# Patient Record
Sex: FEMALE | Race: WHITE | NOT HISPANIC OR LATINO | Employment: FULL TIME | URBAN - METROPOLITAN AREA
[De-identification: names, ages, dates, MRNs, and addresses within clinical notes are randomized per-mention and may not be internally consistent; named-entity substitution may affect disease eponyms.]

---

## 2017-08-17 ENCOUNTER — ALLSCRIPTS OFFICE VISIT (OUTPATIENT)
Dept: OTHER | Facility: OTHER | Age: 57
End: 2017-08-17

## 2017-09-22 ENCOUNTER — TRANSCRIBE ORDERS (OUTPATIENT)
Dept: ADMINISTRATIVE | Facility: HOSPITAL | Age: 57
End: 2017-09-22

## 2017-09-22 DIAGNOSIS — I47.1 PAROXYSMAL SUPRAVENTRICULAR TACHYCARDIA (HCC): ICD-10-CM

## 2017-09-22 DIAGNOSIS — E78.5 OTHER AND UNSPECIFIED HYPERLIPIDEMIA: Primary | ICD-10-CM

## 2017-09-22 DIAGNOSIS — R00.2 PALPITATIONS: ICD-10-CM

## 2017-09-25 ENCOUNTER — GENERIC CONVERSION - ENCOUNTER (OUTPATIENT)
Dept: OTHER | Facility: OTHER | Age: 57
End: 2017-09-25

## 2017-09-25 LAB
CHOLEST SERPL-MCNC: 238 MG/DL (ref 100–199)
CHOLEST/HDLC SERPL: 4.8 {RATIO} (ref 0–4.4)
HDLC SERPL-MCNC: 50 MG/DL
LDLC SERPL CALC-MCNC: 172 MG/DL (ref 0–99)
LIPOPROTEIN (A) (HISTORICAL): 0.89 (ref 0–3)
TRIGL SERPL-MCNC: 78 MG/DL (ref 0–149)
TSH SERPL DL<=0.05 MIU/L-ACNC: 1.58 M[IU]/L (ref 0.45–4.5)
VLDLC SERPL CALC-MCNC: 16 MG/DL (ref 5–40)

## 2017-09-26 ENCOUNTER — GENERIC CONVERSION - ENCOUNTER (OUTPATIENT)
Dept: OTHER | Facility: OTHER | Age: 57
End: 2017-09-26

## 2017-10-12 ENCOUNTER — GENERIC CONVERSION - ENCOUNTER (OUTPATIENT)
Dept: OTHER | Facility: OTHER | Age: 57
End: 2017-10-12

## 2017-10-12 ENCOUNTER — HOSPITAL ENCOUNTER (OUTPATIENT)
Dept: NON INVASIVE DIAGNOSTICS | Facility: HOSPITAL | Age: 57
Discharge: HOME | End: 2017-10-12
Attending: INTERNAL MEDICINE
Payer: COMMERCIAL

## 2017-10-12 DIAGNOSIS — E78.5 OTHER AND UNSPECIFIED HYPERLIPIDEMIA: ICD-10-CM

## 2017-10-12 DIAGNOSIS — R00.2 PALPITATIONS: ICD-10-CM

## 2017-10-12 DIAGNOSIS — I47.1 PAROXYSMAL SUPRAVENTRICULAR TACHYCARDIA (HCC): ICD-10-CM

## 2017-10-12 PROCEDURE — 93017 CV STRESS TEST TRACING ONLY: CPT

## 2017-10-12 RX ORDER — IBUPROFEN 200 MG
TABLET ORAL EVERY 6 HOURS PRN
COMMUNITY
End: 2018-09-25 | Stop reason: ALTCHOICE

## 2017-11-15 DIAGNOSIS — Z12.31 ENCOUNTER FOR SCREENING MAMMOGRAM FOR MALIGNANT NEOPLASM OF BREAST: ICD-10-CM

## 2017-11-15 DIAGNOSIS — R92.8 OTHER ABNORMAL AND INCONCLUSIVE FINDINGS ON DIAGNOSTIC IMAGING OF BREAST: ICD-10-CM

## 2017-11-15 DIAGNOSIS — E78.5 HYPERLIPIDEMIA: ICD-10-CM

## 2017-11-15 DIAGNOSIS — Z78.0 ASYMPTOMATIC MENOPAUSAL STATE: ICD-10-CM

## 2017-11-15 DIAGNOSIS — M54.2 CERVICALGIA: ICD-10-CM

## 2017-12-01 ENCOUNTER — GENERIC CONVERSION - ENCOUNTER (OUTPATIENT)
Dept: FAMILY MEDICINE CLINIC | Facility: CLINIC | Age: 57
End: 2017-12-01

## 2017-12-07 ENCOUNTER — ALLSCRIPTS OFFICE VISIT (OUTPATIENT)
Dept: OTHER | Facility: OTHER | Age: 57
End: 2017-12-07

## 2017-12-07 ENCOUNTER — GENERIC CONVERSION - ENCOUNTER (OUTPATIENT)
Dept: OTHER | Facility: OTHER | Age: 57
End: 2017-12-07

## 2017-12-13 ENCOUNTER — GENERIC CONVERSION - ENCOUNTER (OUTPATIENT)
Dept: FAMILY MEDICINE CLINIC | Facility: CLINIC | Age: 57
End: 2017-12-13

## 2017-12-14 ENCOUNTER — GENERIC CONVERSION - ENCOUNTER (OUTPATIENT)
Dept: FAMILY MEDICINE CLINIC | Facility: CLINIC | Age: 57
End: 2017-12-14

## 2017-12-26 ENCOUNTER — GENERIC CONVERSION - ENCOUNTER (OUTPATIENT)
Dept: FAMILY MEDICINE CLINIC | Facility: CLINIC | Age: 57
End: 2017-12-26

## 2017-12-26 ENCOUNTER — LAB CONVERSION - ENCOUNTER (OUTPATIENT)
Dept: FAMILY MEDICINE CLINIC | Facility: CLINIC | Age: 57
End: 2017-12-26

## 2017-12-26 LAB
A/G RATIO (HISTORICAL): 2 (ref 1.2–2.2)
ALBUMIN SERPL BCP-MCNC: 4.2 G/DL (ref 3.5–5.5)
ALP SERPL-CCNC: 86 IU/L (ref 39–117)
ALT SERPL W P-5'-P-CCNC: 18 IU/L (ref 0–32)
AST SERPL W P-5'-P-CCNC: 22 IU/L (ref 0–40)
BASOPHILS # BLD AUTO: 0 %
BASOPHILS # BLD AUTO: 0 X10E3/UL (ref 0–0.2)
BILIRUB SERPL-MCNC: 0.3 MG/DL (ref 0–1.2)
BUN SERPL-MCNC: 19 MG/DL (ref 6–24)
BUN/CREA RATIO (HISTORICAL): 26 (ref 9–23)
CALCIUM SERPL-MCNC: 9.1 MG/DL (ref 8.7–10.2)
CHLORIDE SERPL-SCNC: 104 MMOL/L (ref 96–106)
CHOLEST SERPL-MCNC: 246 MG/DL (ref 100–199)
CHOLEST/HDLC SERPL: 4.4 RATIO UNITS (ref 0–4.4)
CO2 SERPL-SCNC: 26 MMOL/L (ref 18–29)
CREAT SERPL-MCNC: 0.72 MG/DL (ref 0.57–1)
DEPRECATED RDW RBC AUTO: 13.4 % (ref 12.3–15.4)
EGFR AFRICAN AMERICAN (HISTORICAL): 108 ML/MIN/1.73
EGFR-AMERICAN CALC (HISTORICAL): 94 ML/MIN/1.73
EOSINOPHIL # BLD AUTO: 0.1 X10E3/UL (ref 0–0.4)
EOSINOPHIL # BLD AUTO: 4 %
GLUCOSE SERPL-MCNC: 90 MG/DL (ref 65–99)
HCT VFR BLD AUTO: 39.3 % (ref 34–46.6)
HDLC SERPL-MCNC: 56 MG/DL
HGB BLD-MCNC: 13.1 G/DL (ref 11.1–15.9)
IMM.GRANULOCYTES (CD4/8) (HISTORICAL): 0 %
IMM.GRANULOCYTES (CD4/8) (HISTORICAL): 0 X10E3/UL (ref 0–0.1)
INTERPRETATION (HISTORICAL): NORMAL
LDLC SERPL CALC-MCNC: 176 MG/DL (ref 0–99)
LYMPHOCYTES # BLD AUTO: 1.3 X10E3/UL (ref 0.7–3.1)
LYMPHOCYTES # BLD AUTO: 45 %
MCH RBC QN AUTO: 28.5 PG (ref 26.6–33)
MCHC RBC AUTO-ENTMCNC: 33.3 G/DL (ref 31.5–35.7)
MCV RBC AUTO: 85 FL (ref 79–97)
MONOCYTES # BLD AUTO: 0.2 X10E3/UL (ref 0.1–0.9)
MONOCYTES (HISTORICAL): 9 %
NEUTROPHILS # BLD AUTO: 1.2 X10E3/UL (ref 1.4–7)
NEUTROPHILS # BLD AUTO: 42 %
PLATELET # BLD AUTO: 185 X10E3/UL (ref 150–379)
POTASSIUM SERPL-SCNC: 4.3 MMOL/L (ref 3.5–5.2)
RBC (HISTORICAL): 4.6 X10E6/UL (ref 3.77–5.28)
SODIUM SERPL-SCNC: 144 MMOL/L (ref 134–144)
TOT. GLOBULIN, SERUM (HISTORICAL): 2.1 G/DL (ref 1.5–4.5)
TOTAL PROTEIN (HISTORICAL): 6.3 G/DL (ref 6–8.5)
TRIGL SERPL-MCNC: 72 MG/DL (ref 0–149)
VLDLC SERPL CALC-MCNC: 14 MG/DL (ref 5–40)
WBC # BLD AUTO: 2.8 X10E3/UL (ref 3.4–10.8)

## 2017-12-27 ENCOUNTER — GENERIC CONVERSION - ENCOUNTER (OUTPATIENT)
Dept: OTHER | Facility: OTHER | Age: 57
End: 2017-12-27

## 2018-01-10 NOTE — RESULT NOTES
Discussion/Summary   Normal stress test      Verified Results  STRESS TEST ONLY, EXERCISE [id number] 10:51AM Calista [place]     Test Name Result Flag Reference   STRESS TEST ONLY, EXERCISE (Report)     [address]   [address] [facility]   [address]   [phone]     Stress Electrocardiography during Exercise     Name: Lindie Dancer   MR #: [id number]   Account #: [de-identified]   Study date: 10/12/2017   : 1960   Age: 65 years   Gender: Female   Height: 65 in   Weight: 136 lb   BSA: 1 68 m squared     Allergies: ALLERGIES NOT ON FILE     Diagnosis: E78 5 - Hyperlipidemia, unspecified, I47 1 - Supraventricular tachycardia, R00 2 - Palpitations     Primary Physician: Kwadwo Awad MD   Referring Physician: Yuri Thurman MD   Interpreting Physician: Loreto Gross MD     CLINICAL QUESTION: Detection of coronary artery disease  HISTORY: The patient is a 65year old  female  Chest pain status: no chest pain  Other symptoms: palpitations  Coronary artery disease risk factors: dyslipidemia and family history of premature coronary artery disease  Cardiovascular history: none significant  Medications: no cardiac drugs  PHYSICAL EXAM: Baseline physical exam screening: normal      REST ECG: Normal sinus rhythm  PROCEDURE: Treadmill exercise testing was performed, using the Albert protocol  Stress electrocardiographic evaluation was performed  The heart rate was 98, at the start of the study  Systolic blood pressure was 140 mmHg, at the start of   the study  Diastolic blood pressure was 80 mmHg, at the start of the study  ALBERT PROTOCOL:   HR bpm SBP mmHg DBP mmHg Symptoms Rhythm/conduct   Baseline 98 140 80 none NSR   Stage 1 121 120 80 none --   Stage 2 139 124 80 none --   Stage 3 160 -- -- none --   Immediate 148 138 80 none --   Recovery 1 114 128 80 none --   Recovery 2 108 110 80 none --   No medications or fluids given       STRESS SUMMARY: Duration of exercise was 9 min  The patient exercised to protocol stage 3  Maximal work rate was 10 1 METs  Maximal heart rate during stress was 160 bpm ( 98 % of maximal predicted heart rate)  The heart rate response to   stress was normal  There was resting hypertension with an appropriate blood pressure response to stress  The rate-pressure product for the peak heart rate and blood pressure was 31868  There was no chest pain during stress  The stress test   was terminated due to achievement of target heart rate  pre O2 sat= 97%     Peak O2 Sat= 98% The stress ECG was negative for ischemia  There were no stress arrhythmias or conduction abnormalities  SUMMARY:   - Stress results: Duration of exercise was 9 min  Target heart rate was achieved  There was resting hypertension with an appropriate blood pressure response to stress  There was no chest pain during stress  - ECG conclusions: The stress ECG was negative for ischemia  IMPRESSIONS: Normal study after maximal exercise without reproduction of symptoms  Resting tachycardia noted       Prepared and signed by     Tejal Vyas MD   Signed 10/12/2017 15:49:02

## 2018-01-13 VITALS
SYSTOLIC BLOOD PRESSURE: 110 MMHG | HEIGHT: 65 IN | HEART RATE: 72 BPM | DIASTOLIC BLOOD PRESSURE: 80 MMHG | OXYGEN SATURATION: 98 % | BODY MASS INDEX: 22.79 KG/M2 | WEIGHT: 136.8 LBS

## 2018-01-14 NOTE — PROGRESS NOTES
Assessment    1  Encounter for preventive health examination (V70 0) (Z00 00)   2  Body mass index (BMI) 23 0-23 9, adult (V85 1) (Z68 23)   3  Cervicalgia (723 1) (M54 2)    Plan  Body mass index (BMI) 23 0-23 9, adult    · (1) CBC/PLT/DIFF; Status:Active; Requested for:29Pfu8358;   Cervicalgia    · *1 - [facility] Physical Therapy  Consult  Status: Hold For - Scheduling   Requested for: 13SEP6155  Care Summary provided  : Yes  Colon cancer screening    · COLONOSCOPY; Status:Active; Requested for:36Nkr3519;   Encounter for screening mammogram for malignant neoplasm of breast    · * MAMMO SCREENING BILATERAL W CAD; Status:Active; Requested for:83Mqd7578; Health Maintenance    · Follow-up visit in 1 year Evaluation and Treatment  Follow-up  Status: Hold For -  Scheduling  Requested for: 29IRX8903  Lipid screening    · (1) COMPREHENSIVE METABOLIC PANEL; Status:Active; Requested for:78Uml1632;    · (1) LIPID PANEL, FASTING; Status:Active; Requested for:81Epv2070;     Discussion/Summary  health maintenance visit Currently, she eats a healthy diet and has an adequate exercise regimen  the risks and benefits of cervical cancer screening were discussed cervical cancer screening is needed every three years next cervical cancer screening is due 2018 Breast cancer screening: the risks and benefits of breast cancer screening were discussed and mammogram has been ordered  Colorectal cancer screening: colonoscopy has been ordered  Screening lab work includes hemoglobin, glucose and thyroid function testing  The patient declines immunizations  Advice and education were given regarding nutrition, aerobic exercise, calcium supplements, vitamin D supplements, alcohol use and sunscreen use  Patient discussion: discussed with the patient  Chief Complaint  pt present for CPE  ac/cma      History of Present Illness  HM, Adult Female: The patient is being seen for a health maintenance evaluation   The last health maintenance visit was 12 month(s) ago  General Health: The patient's health since the last visit is described as good  She has regular dental visits  She denies vision problems  She denies hearing loss  Immunizations status: not up to date  Lifestyle:  She consumes a diverse and healthy diet  She does not have any weight concerns  She exercises regularly  She exercises 3 or more times per week  Exercise includes biking  She does not use tobacco  She denies alcohol use  She denies drug use  Reproductive health: the patient is postmenopausal    Screening: cancer screening reviewed and updated  metabolic screening reviewed and updated  risk screening reviewed and updated  HPI: Here for CPE  Has continued R neck pain, Dr Rory Castro had recommended PT in the past but she did not do  No radiation, seems to be worse with stress and she is in the process of selling her mother's house and moving mother in with her  Review of Systems    Constitutional: No fever, no chills, feels well, no tiredness, no recent weight gain or weight loss  Eyes: No complaints of eye pain, no red eyes, no eyesight problems, no discharge, no dry eyes, no itching of eyes  ENT: no complaints of earache, no loss of hearing, no nose bleeds, no nasal discharge, no sore throat, no hoarseness  Cardiovascular: No complaints of slow heart rate, no fast heart rate, no chest pain, no palpitations, no leg claudication, no lower extremity edema  Respiratory: No complaints of shortness of breath, no wheezing, no cough, no SOB on exertion, no orthopnea, no PND  Gastrointestinal: No complaints of abdominal pain, no constipation, no nausea or vomiting, no diarrhea, no bloody stools  Genitourinary: No complaints of dysuria, no incontinence, no pelvic pain, no dysmenorrhea, no vaginal discharge or bleeding  Musculoskeletal: as noted in HPI     Integumentary: No complaints of skin rash or lesions, no itching, no skin wounds, no breast pain or lump  Neurological: No complaints of headache, no confusion, no convulsions, no numbness, no dizziness or fainting, no tingling, no limb weakness, no difficulty walking  Psychiatric: Not suicidal, no sleep disturbance, no anxiety or depression, no change in personality, no emotional problems  Endocrine: No complaints of proptosis, no hot flashes, no muscle weakness, no deepening of the voice, no feelings of weakness  Hematologic/Lymphatic: No complaints of swollen glands, no swollen glands in the neck, does not bleed easily, does not bruise easily  Active Problems    1  Allergic rhinitis (477 9) (J30 9)   2  Colon cancer screening (V76 51) (Z12 11)   3  Dysphagia (787 20) (R13 10)   4  Encounter for routine gynecological examination (V72 31) (Z01 419)   5  Encounter for screening mammogram for malignant neoplasm of breast (V76 12)   (Z12 31)   6  Paroxysmal atrial tachycardia (427 0) (I47 1)   7  Pityriasis rosea (696 3) (L42)   8  Screening for eye condition (V80 2) (Z13 5)    Past Medical History    · History of Acute upper respiratory infection (465 9) (J06 9)   · History of acute conjunctivitis (V12 49) (Z86 69)   · History of athlete's foot (V12 09) (Z86 19)   · History of Late Effects Of Sprain Or Strain (905 7)   · Pityriasis rosea (696 3) (L42)   · History of Screening for malignant neoplasm of breast (V76 10) (Z12 39)    Family History  Mother    · Family history of Breast Cancer (V16 3)  Father    · Family history of Coronary Artery Disease (V17 49)    Social History    · Never A Smoker    Current Meds   1  Benadryl Allergy CAPS; as needed; Therapy: 20Mar2012 to  Requested for: 93LAK4820 Recorded    Allergies    1  Skelaxin TABS   2   Penicillins    Vitals   Recorded: 43GDE7412 53:36UB   Systolic 596   Diastolic 86   Heart Rate 76   Respiration 16   Temperature 97 F   Height 5 ft 5 in   Weight 143 lb    BMI Calculated 23 8   BSA Calculated 1 72     Physical Exam    Constitutional   General appearance: No acute distress, well appearing and well nourished  Eyes   Conjunctiva and lids: No swelling, erythema or discharge  Pupils and irises: Equal, round, reactive to light  Ears, Nose, Mouth, and Throat   External inspection of ears and nose: Normal     Otoscopic examination: Tympanic membranes translucent with normal light reflex  Canals patent without erythema  Hearing: Normal     Nasal mucosa, septum, and turbinates: Normal without edema or erythema  Lips, teeth, and gums: Normal, good dentition  Oropharynx: Normal with no erythema, edema, exudate or lesions  Neck   Neck: Supple, symmetric, trachea midline, no masses  Thyroid: Normal, no thyromegaly  Pulmonary   Respiratory effort: No increased work of breathing or signs of respiratory distress  Percussion of chest: Normal     Palpation of chest: Normal     Auscultation of lungs: Clear to auscultation  Cardiovascular   Palpation of heart: Normal PMI, no thrills  Auscultation of heart: Normal rate and rhythm, normal S1 and S2, no murmurs  Carotid pulses: 2+ bilaterally  Abdominal aorta: Normal     Femoral pulses: 2+ bilaterally  Pedal pulses: 2+ bilaterally  Examination of extremities for edema and/or varicosities: Normal     Chest   Breasts: Normal, no dimpling or skin changes appreciated  Palpation of breasts and axillae: Normal, no masses palpated  Abdomen   Abdomen: Non-tender, no masses  Liver and spleen: No hepatomegaly or splenomegaly  Examination for hernias: No hernia appreciated  Lymphatic   Palpation of lymph nodes in neck: No lymphadenopathy  Palpation of lymph nodes in axillae: No lymphadenopathy  Palpation of lymph nodes in groin: No lymphadenopathy  Palpation of lymph nodes in other areas: No lymphadenopathy  Musculoskeletal   Gait and station: Normal     Digits and nails: Normal without clubbing or cyanosis      Joints, bones, and muscles: Abnormal   (L trapezius spasm and tenderness)   Range of motion: Normal     Stability: Normal     Muscle strength/tone: Normal     Skin   Skin and subcutaneous tissue: Normal without rashes or lesions  Palpation of skin and subcutaneous tissue: Normal turgor  Neurologic   Cranial nerves: Cranial nerves II-XII intact  Reflexes: 2+ and symmetric  Sensation: No sensory loss  Psychiatric   Judgment and insight: Normal     Orientation to person, place, and time: Normal     Recent and remote memory: Intact  Mood and affect: Normal        Procedure    Procedure: Visual Acuity Test    Indication: routine screening  Inforrmation supplied by a Snellen chart  Results: 20/20 in both eyes without corrective device, 20/20 in the right eye without corrective device, 20/20 in the left eye without corrective device      Health Management  Encounter for routine gynecological examination   (every) THINPREP PAP; every 1 year; Last [id number]; Next Due: 94CUS9489; Overdue  Encounter for screening mammogram for malignant neoplasm of breast   Digital Bilateral Screening Mammogram With CAD; every 1 year; Last 38AKI1965; Next  Due: 48BSB9196;  Overdue    Signatures   Electronically signed by : Pauline Kawasaki, M D ; Sep 22 2016  9:03PM EST                       (Author)

## 2018-01-16 NOTE — RESULT NOTES
Discussion/Summary   Not much change in cholesterol level with normal TSH and low risk high sensitivity CRP  Was to have exercise stress test, but I do not see the report  Check into stress test report and scheduling         Verified Results  (Q) CARDIO IQ(R) HS-CRP [id number] 12:00AM [facility] [employer]     Test Name Result Flag Reference   LIPOPROTEIN (a) 0 89  0 00-3 00     (1) LIPID PANEL, FASTING [id number] 12:00AM [employer]     Test Name Result Flag Reference   CHOLESTEROL 238 H 100-199   HDL,DIRECT 50  >39   LDL CHOLESTEROL CALCULATED 172 H 0-99   TRIGLYCERIDES 78  0-149   VLDL,CALCULATED 16  5-40   TCHOL/HDL RATIO 4 8 H 0 0-4 4     (1) TSH [id number] 12:00AM [facility] [employer]     Test Name Result Flag Reference   TSH 1 580   450-4 500

## 2018-01-23 NOTE — RESULT NOTES
Verified Results  (1) CBC/PLT/DIFF [id number] 09:17AM Thais Milligan     Test Name Result Flag Reference   WBC 2 8 x10E3/uL L 3 4-10 8   RBC 4 60 x10E6/uL  3 77-5 28   Hemoglobin 13 1 g/dL  11 1-15 9   Hematocrit 39 3 %  34 0-46  6   MCV 85 fL  79-97   MCH 28 5 pg  26 6-33 0   MCHC 33 3 g/dL  31 5-35 7   RDW 13 4 %  12 3-15 4   Platelets 332 G72L7/TQ  150-379   Neutrophils 42 %  Not Estab  Lymphs 45 %  Not Estab  Monocytes 9 %  Not Estab  Eos 4 %  Not Estab  Basos 0 %  Not Estab  Neutrophils (Absolute) 1 2 x10E3/uL L 1 4-7 0   Lymphs (Absolute) 1 3 x10E3/uL  0 7-3 1   Monocytes(Absolute) 0 2 x10E3/uL  0 1-0 9   Eos (Absolute) 0 1 x10E3/uL  0 0-0 4   Baso (Absolute) 0 0 x10E3/uL  0 0-0 2   Immature Granulocytes 0 %  Not Estab  Immature Grans (Abs) 0 0 x10E3/uL  0 0-0 1     (1) COMPREHENSIVE METABOLIC PANEL [id number] 66:72BI Roseann Shaffer Stable     Test Name Result Flag Reference   Glucose, Serum 90 mg/dL  65-99   BUN 19 mg/dL  6-24   Creatinine, Serum 0 72 mg/dL  0 57-1 00   BUN/Creatinine Ratio 26 H 9-23   Sodium, Serum 144 mmol/L  134-144   Potassium, Serum 4 3 mmol/L  3 5-5 2   Chloride, Serum 104 mmol/L     Carbon Dioxide, Total 26 mmol/L  18-29   Calcium, Serum 9 1 mg/dL  8 7-10 2   Protein, Total, Serum 6 3 g/dL  6 0-8 5   Albumin, Serum 4 2 g/dL  3 5-5 5   Globulin, Total 2 1 g/dL  1 5-4 5   A/G Ratio 2 0  1 2-2 2   Bilirubin, Total 0 3 mg/dL  0 0-1 2   Alkaline Phosphatase, S 86 IU/L     AST (SGOT) 22 IU/L  0-40   ALT (SGPT) 18 IU/L  0-32   eGFR If NonAfricn Am 94 mL/min/1 73  >59   eGFR If Africn Am 108 mL/min/1 73  >59     (1) LIPID PANEL, FASTING [id number] 09:17AM Roseann Shaffer Stable     Test Name Result Flag Reference   Cholesterol, Total 246 mg/dL H 100-199   Triglycerides 72 mg/dL  0-149   HDL Cholesterol 56 mg/dL  >39   VLDL Cholesterol Bo 14 mg/dL  5-40   LDL Cholesterol Calc 176 mg/dL H 0-99   T  Chol/HDL Ratio 4 4 ratio units  0 0-4 4   T   Chol/HDL Ratio Men  Women                                               1/2 Avg  Risk  3 4    3 3                                                   Avg Risk  5 0    4 4                                                2X Avg  Risk  9 6    7 1                                                3X Avg  Risk 23 4   11 0     [facility]) Cardiovascular Risk Assessment [id number] 09:17AM Catarino Boxer     Test Name Result Flag Reference   Interpretation Note     Supplemental report is available  PDF Image

## 2018-01-23 NOTE — PROGRESS NOTES
Assessment    1  Encounter for preventive health examination (V70 0) (Z00 00)   2  Body mass index (BMI) 19 9 or less, adult (Z68 1)   3  Colon cancer screening (V76 51) (Z12 11)    Plan  Cervicalgia    · *1 -  PHYSICAL THERAPY-[facility]  *  Status: Active  Requested  for: [id number]  Care Summary provided  : Yes  Colon cancer screening    · COLONOSCOPY; Status:Active; Requested for:86Egq2395;   Dyslipidemia    · (1) CBC/PLT/DIFF; Status:Active; Requested for:71Ajn6527;    · (1) COMPREHENSIVE METABOLIC PANEL; Status:Active; Requested for:46Anx5753;    · (1) LIPID PANEL, FASTING; Status:Active; Requested for:46Wcm0684;   Dyslipidemia, Menopause    · * DXA BONE DENSITY SPINE HIP AND PELVIS; Status:Hold For - Scheduling;  Requested for:88Vts9779;   Encounter for screening mammogram for malignant neoplasm of breast    · * MAMMO SCREENING BILATERAL W CAD; Status:Active; Requested for:51Itf8051;     Discussion/Summary  health maintenance visit Currently, she eats a healthy diet and has an adequate exercise regimen  cervical cancer screening is current cervical cancer screening is needed every three years next cervical cancer screening is due 2018 Breast cancer screening: the risks and benefits of breast cancer screening were discussed, monthly self breast exam was advised and mammogram has been ordered  Colorectal cancer screening: colonoscopy has been ordered  Osteoporosis screening: bone mineral density testing has been ordered  Screening lab work includes hemoglobin, glucose and lipid profile  The immunizations are up to date  Advice and education were given regarding nutrition, aerobic exercise, calcium supplements, vitamin D supplements, alcohol use, sunscreen use and seat belt use  Patient discussion: discussed with the patient  Chief Complaint  pt present for CPE  ac/cma      History of Present Illness  HM, Adult Female: The patient is being seen for a health maintenance evaluation   The last health maintenance visit was 12 month(s) ago  General Health: The patient's health since the last visit is described as good  She has regular dental visits  She denies vision problems  She denies hearing loss  Immunizations status: not up to date  Lifestyle:  She consumes a diverse and healthy diet  She does not have any weight concerns  She does not exercise regularly  She does not use tobacco  She denies alcohol use  She denies drug use  Reproductive health: the patient is postmenopausal    Screening: cancer screening reviewed and updated  metabolic screening reviewed and updated  risk screening reviewed and updated  HPI: Here for CPE, also has a cough  Review of Systems    Constitutional: No fever, no chills, feels well, no tiredness, no recent weight gain or weight loss  Eyes: No complaints of eye pain, no red eyes, no eyesight problems, no discharge, no dry eyes, no itching of eyes  ENT: no complaints of earache, no loss of hearing, no nose bleeds, no nasal discharge, no sore throat, no hoarseness  Cardiovascular: No complaints of slow heart rate, no fast heart rate, no chest pain, no palpitations, no leg claudication, no lower extremity edema  Respiratory: No complaints of shortness of breath, no wheezing, no cough, no SOB on exertion, no orthopnea, no PND  Gastrointestinal: No complaints of abdominal pain, no constipation, no nausea or vomiting, no diarrhea, no bloody stools  Genitourinary: No complaints of dysuria, no incontinence, no pelvic pain, no dysmenorrhea, no vaginal discharge or bleeding  Musculoskeletal: No complaints of arthralgias, no myalgias, no joint swelling or stiffness, no limb pain or swelling  Integumentary: No complaints of skin rash or lesions, no itching, no skin wounds, no breast pain or lump     Neurological: No complaints of headache, no confusion, no convulsions, no numbness, no dizziness or fainting, no tingling, no limb weakness, no difficulty walking  Psychiatric: Not suicidal, no sleep disturbance, no anxiety or depression, no change in personality, no emotional problems  Endocrine: No complaints of proptosis, no hot flashes, no muscle weakness, no deepening of the voice, no feelings of weakness  Hematologic/Lymphatic: No complaints of swollen glands, no swollen glands in the neck, does not bleed easily, does not bruise easily  Active Problems    1  Allergic rhinitis (477 9) (J30 9)   2  Cervicalgia (723 1) (M54 2)   3  Colon cancer screening (V76 51) (Z12 11)   4  Dyslipidemia (272 4) (E78 5)   5  Dysphagia (787 20) (R13 10)   6  Encounter for routine gynecological examination (V72 31) (Z01 419)   7  Encounter for screening mammogram for malignant neoplasm of breast (V76 12)   (Z12 31)   8  Intermittent palpitations (785 1) (R00 2)   9  Lipid screening (V77 91) (Z13 220)   10  Paroxysmal atrial tachycardia (427 0) (I47 1)   11  Pityriasis rosea (696 3) (L42)   12  Screening for eye condition (V80 2) (Z13 5)   13  Vitreous detachment of right eye (379 21) (H43 811)    Past Medical History    · History of Acute upper respiratory infection (465 9) (J06 9)   · History of acute conjunctivitis (V12 49) (Z86 69)   · History of athlete's foot (V12 09) (Z86 19)   · History of Late Effects Of Sprain Or Strain (905 7)   · Pityriasis rosea (696 3) (L42)   · History of Screening for malignant neoplasm of breast (V76 10) (Z12 31)    Family History  Mother    · Family history of Breast Cancer (V16 3)  Father    · Family history of Coronary Artery Disease (V17 49)    Social History    · Never A Smoker    Current Meds   1  Advil 200 MG Oral Capsule; TAKE 1 CAPSULE EVERY 4 TO 6 HOURS; Therapy: [id number] to Recorded   2  Benadryl Allergy CAPS; TAKE 1 CAPSULE Bedtime; Therapy: 20Mar2012 to  Requested for: 17Aug2017 Recorded    Allergies    1  Skelaxin TABS   2   Penicillins    Vitals   Recorded: 93ZZX2322 06:41PM   Temperature 97 8 F   Heart Rate 82 Respiration 16   Systolic 120   Diastolic 70   Height 5 ft 5 in   Weight 120 lb    BMI Calculated 19 97   BSA Calculated 1 59     Physical Exam    Constitutional   General appearance: No acute distress, well appearing and well nourished  Eyes   Conjunctiva and lids: No swelling, erythema or discharge  Pupils and irises: Equal, round, reactive to light  Ears, Nose, Mouth, and Throat   External inspection of ears and nose: Normal     Otoscopic examination: Tympanic membranes translucent with normal light reflex  Canals patent without erythema  Hearing: Normal     Nasal mucosa, septum, and turbinates: Normal without edema or erythema  Lips, teeth, and gums: Normal, good dentition  Oropharynx: Normal with no erythema, edema, exudate or lesions  Neck   Neck: Supple, symmetric, trachea midline, no masses  Thyroid: Normal, no thyromegaly  Pulmonary   Respiratory effort: No increased work of breathing or signs of respiratory distress  Percussion of chest: Normal     Palpation of chest: Normal     Auscultation of lungs: Clear to auscultation  Cardiovascular   Palpation of heart: Normal PMI, no thrills  Auscultation of heart: Normal rate and rhythm, normal S1 and S2, no murmurs  Carotid pulses: 2+ bilaterally  Abdominal aorta: Normal     Femoral pulses: 2+ bilaterally  Pedal pulses: 2+ bilaterally  Examination of extremities for edema and/or varicosities: Normal     Abdomen   Abdomen: Non-tender, no masses  Liver and spleen: No hepatomegaly or splenomegaly  Examination for hernias: No hernia appreciated  Lymphatic   Palpation of lymph nodes in neck: No lymphadenopathy  Palpation of lymph nodes in axillae: No lymphadenopathy  Palpation of lymph nodes in groin: No lymphadenopathy  Palpation of lymph nodes in other areas: No lymphadenopathy  Musculoskeletal   Gait and station: Normal     Digits and nails: Normal without clubbing or cyanosis      Joints, bones, and muscles: Normal     Range of motion: Normal     Stability: Normal     Muscle strength/tone: Normal     Skin   Skin and subcutaneous tissue: Normal without rashes or lesions  Palpation of skin and subcutaneous tissue: Normal turgor  Neurologic   Cranial nerves: Cranial nerves II-XII intact  Reflexes: 2+ and symmetric  Sensation: No sensory loss  Psychiatric   Judgment and insight: Normal     Orientation to person, place, and time: Normal     Recent and remote memory: Intact  Mood and affect: Normal        Results/Data  [facility] Risk [id number] 06:58PM Micheal Shaffer     Test Name Result Flag Reference   [facility] - Comparative Ten Year Risk of CHD 3 7 %     [facility] - Ten Year Risk of CHD 1 99 %     Sex: Female  Ethnicity: White/  Age: 65  Total Cholesterol: 238    HDL Cholesterol: 50    Systolic Blood Pressure: 135 mmHg  Blood Pressure Medication: No  Diabetes: No  Smoking Status: No       Procedure    Procedure: Visual Acuity Test    Indication: routine screening  Inforrmation supplied by a Snellen chart     Results: 20/13 in both eyes without corrective device, 20/15 in the right eye without corrective device, 20/13 in the left eye without corrective device      Signatures   Electronically signed by : ALLEN Schmidt ; Dec  7 2017  7:10PM EST                       (Author)

## 2018-01-24 VITALS
WEIGHT: 120 LBS | HEIGHT: 65 IN | SYSTOLIC BLOOD PRESSURE: 112 MMHG | DIASTOLIC BLOOD PRESSURE: 70 MMHG | HEART RATE: 82 BPM | RESPIRATION RATE: 16 BRPM | TEMPERATURE: 97.8 F | BODY MASS INDEX: 19.99 KG/M2

## 2018-04-25 ENCOUNTER — TELEPHONE (OUTPATIENT)
Dept: CARDIOLOGY CLINIC | Facility: CLINIC | Age: 58
End: 2018-04-25

## 2018-04-25 NOTE — TELEPHONE ENCOUNTER
Upon Sofia's call, I spoke with Dr Chris Vasquez Dr  Recommended that she either come to the office to get an EKG or go to the E D  Dr Viki Diallo that in the E D  She would be able to be treated for any abnormalities, whereas we would not be able to do so in the office   I relayed the message to Linda Kim

## 2018-05-10 ENCOUNTER — OFFICE VISIT (OUTPATIENT)
Dept: FAMILY MEDICINE CLINIC | Facility: CLINIC | Age: 58
End: 2018-05-10
Payer: COMMERCIAL

## 2018-05-10 VITALS
WEIGHT: 112 LBS | DIASTOLIC BLOOD PRESSURE: 68 MMHG | SYSTOLIC BLOOD PRESSURE: 106 MMHG | TEMPERATURE: 97.9 F | HEART RATE: 100 BPM | HEIGHT: 65 IN | RESPIRATION RATE: 16 BRPM | BODY MASS INDEX: 18.66 KG/M2

## 2018-05-10 DIAGNOSIS — J30.1 SEASONAL ALLERGIC RHINITIS DUE TO POLLEN: Primary | ICD-10-CM

## 2018-05-10 DIAGNOSIS — J32.9 SINUSITIS, UNSPECIFIED CHRONICITY, UNSPECIFIED LOCATION: ICD-10-CM

## 2018-05-10 PROBLEM — R00.2 INTERMITTENT PALPITATIONS: Status: ACTIVE | Noted: 2017-08-17

## 2018-05-10 PROBLEM — E78.5 DYSLIPIDEMIA: Status: ACTIVE | Noted: 2017-08-17

## 2018-05-10 PROCEDURE — 99213 OFFICE O/P EST LOW 20 MIN: CPT | Performed by: INTERNAL MEDICINE

## 2018-05-10 RX ORDER — SULFAMETHOXAZOLE AND TRIMETHOPRIM 800; 160 MG/1; MG/1
1 TABLET ORAL EVERY 12 HOURS SCHEDULED
Qty: 14 TABLET | Refills: 0 | Status: SHIPPED | OUTPATIENT
Start: 2018-05-10 | End: 2018-05-17

## 2018-05-10 NOTE — PROGRESS NOTES
Subjective:      Patient ID: Emeka Matthews is a 65 y o  female  Chief Complaint   Patient presents with    Nasal Congestion     for about 6 days, pt has been taking advil, which states shelps with the throat     Cough    Sore Throat    Sinus pressure       Here with 6 days of acute illness, severe sore throat, congestion, post nasal drip, cough  Denies fever  Taking advil for her throat with some relief  Does have seasonal allergies but did not think this was it  The following portions of the patient's history were reviewed and updated as appropriate: allergies, current medications, past family history, past medical history, past social history, past surgical history and problem list     Review of Systems   Constitutional: Positive for fatigue  HENT: Positive for congestion and sore throat  Respiratory: Positive for cough  Cardiovascular: Negative  Current Outpatient Prescriptions   Medication Sig Dispense Refill    DiphenhydrAMINE HCl (BENADRYL ALLERGY PO) Take by mouth      ibuprofen (MOTRIN) 200 mg tablet Take by mouth every 6 (six) hours as needed for mild pain      sulfamethoxazole-trimethoprim (BACTRIM DS) 800-160 mg per tablet Take 1 tablet by mouth every 12 (twelve) hours for 14 doses 14 tablet 0     No current facility-administered medications for this visit  Objective:    /68   Pulse 100   Temp 97 9 °F (36 6 °C)   Resp 16   Ht 5' 5" (1 651 m)   Wt 50 8 kg (112 lb)   BMI 18 64 kg/m²        Physical Exam   Constitutional: She appears well-developed and well-nourished  HENT:   Head: Normocephalic and atraumatic  Right Ear: Tympanic membrane is retracted  Left Ear: Tympanic membrane is retracted  Nose: Mucosal edema present  Mouth/Throat: Oropharynx is clear and moist    Nasal mucosae edematous and pale   Eyes: Conjunctivae are normal    Neck: Neck supple  No JVD present  No thyromegaly present     Cardiovascular: Normal rate, regular rhythm, normal heart sounds and intact distal pulses  Exam reveals no gallop and no friction rub  No murmur heard  Pulmonary/Chest: Effort normal and breath sounds normal  She has no wheezes  She has no rales  Assessment/Plan:    Allergic rhinitis  Symptoms appear allergic, recommended claritin daily with flonase 2 puffs daily as well  Diagnoses and all orders for this visit:    Seasonal allergic rhinitis due to pollen  Comments:  Advised flonase and claritin PRN  Sinusitis, unspecified chronicity, unspecified location  Comments:  Antibiotics were given in case of worsening symptoms, fever, green discharge, etc   Orders:  -     sulfamethoxazole-trimethoprim (BACTRIM DS) 800-160 mg per tablet; Take 1 tablet by mouth every 12 (twelve) hours for 14 doses          Return if symptoms worsen or fail to improve         Norma Johnson MD

## 2018-05-10 NOTE — PATIENT INSTRUCTIONS
Advised flonase otc 2 puffs to each nostril daily for at least one month  Advised otc claritin once daily PRN  Antibiotics if not improving

## 2018-09-25 ENCOUNTER — OFFICE VISIT (OUTPATIENT)
Dept: OBGYN CLINIC | Facility: CLINIC | Age: 58
End: 2018-09-25
Payer: COMMERCIAL

## 2018-09-25 VITALS
DIASTOLIC BLOOD PRESSURE: 83 MMHG | HEART RATE: 67 BPM | HEIGHT: 65 IN | SYSTOLIC BLOOD PRESSURE: 132 MMHG | WEIGHT: 112 LBS | BODY MASS INDEX: 18.66 KG/M2

## 2018-09-25 DIAGNOSIS — M54.2 NECK PAIN, MUSCULOSKELETAL: Primary | ICD-10-CM

## 2018-09-25 PROCEDURE — 99203 OFFICE O/P NEW LOW 30 MIN: CPT | Performed by: ORTHOPAEDIC SURGERY

## 2018-09-25 RX ORDER — NAPROXEN 500 MG/1
500 TABLET ORAL 2 TIMES DAILY WITH MEALS
Qty: 60 TABLET | Refills: 0 | Status: SHIPPED | OUTPATIENT
Start: 2018-09-25 | End: 2018-10-23 | Stop reason: CLARIF

## 2018-09-25 NOTE — PROGRESS NOTES
Assessment/Plan:  1  Neck pain, musculoskeletal  Ambulatory referral to Physical Therapy    naproxen (NAPROSYN) 500 mg tablet     Yosef Lindsay has symptoms consistent with chronic muscle spasm  She may have some underlying arthritis in her cervical spine but does not demonstrate radiculopathy on exam   I would like for her to start with physical therapy including myofascial release and traction treatments  She will follow up in the office in 6 weeks for repeat evaluation if she still has pain at that time we could proceed with x-rays or further imaging if indicated  Subjective: Alfonso Coburn is a 63 y o  female who presents for evaluation for chronic neck discomfort  She denies any acute injury  She states that the pain is an aching throbbing intermittent discomfort along the upper left neck region  She has difficulty turning her head at times due to discomfort  She denies any radiating pain or radiculopathy down her arm  She has tried multiple attempts at different pillows as well as Advil and Benadryl to try to help her sleep  She denies any weakness in her left upper extremity as well  Review of Systems   Constitutional: Negative for chills, fever and unexpected weight change  HENT: Negative for hearing loss, nosebleeds and sore throat  Eyes: Negative for pain, redness and visual disturbance  Respiratory: Negative for cough, shortness of breath and wheezing  Cardiovascular: Negative for chest pain, palpitations and leg swelling  Gastrointestinal: Negative for abdominal pain, nausea and vomiting  Endocrine: Negative for polydipsia and polyuria  Genitourinary: Negative for dysuria and hematuria  Musculoskeletal:        See HPI   Skin: Negative for rash and wound  Neurological: Negative for dizziness, numbness and headaches  Psychiatric/Behavioral: Negative for decreased concentration and suicidal ideas  The patient is not nervous/anxious            Past Medical History:   Diagnosis Date  Pityriasis rosea        History reviewed  No pertinent surgical history  Family History   Problem Relation Age of Onset    Breast cancer Mother     Coronary artery disease Father        Social History     Occupational History    Not on file  Social History Main Topics    Smoking status: Never Smoker    Smokeless tobacco: Never Used    Alcohol use Not on file    Drug use: Unknown    Sexual activity: Not on file         Current Outpatient Prescriptions:     DiphenhydrAMINE HCl (BENADRYL ALLERGY PO), Take by mouth, Disp: , Rfl:     naproxen (NAPROSYN) 500 mg tablet, Take 1 tablet (500 mg total) by mouth 2 (two) times a day with meals, Disp: 60 tablet, Rfl: 0    Allergies   Allergen Reactions    Metaxalone Hives     Reaction Date: [id number];     Penicillins      Reaction Date: [id number]; Objective:  Vitals:    09/25/18 1708   BP: 132/83   Pulse: 67       Ortho Exam    Physical Exam   Constitutional: She is oriented to person, place, and time  She appears well-developed and well-nourished  HENT:   Head: Normocephalic and atraumatic  Eyes: Conjunctivae are normal    Neck: Neck supple  Muscular tenderness present  No spinous process tenderness present  Decreased range of motion present  Cardiovascular: Intact distal pulses  Pulmonary/Chest: Effort normal    Musculoskeletal:        Cervical back: She exhibits tenderness  She exhibits no bony tenderness  Back:    Neurological: She is alert and oriented to person, place, and time  She has normal strength  No sensory deficit  Skin: Skin is warm and dry  Psychiatric: She has a normal mood and affect  Her behavior is normal    Vitals reviewed

## 2018-10-09 ENCOUNTER — EVALUATION (OUTPATIENT)
Dept: PHYSICAL THERAPY | Facility: CLINIC | Age: 58
End: 2018-10-09
Payer: COMMERCIAL

## 2018-10-09 DIAGNOSIS — M54.2 NECK PAIN, MUSCULOSKELETAL: Primary | ICD-10-CM

## 2018-10-09 PROCEDURE — G8981 BODY POS CURRENT STATUS: HCPCS

## 2018-10-09 PROCEDURE — G8982 BODY POS GOAL STATUS: HCPCS

## 2018-10-09 PROCEDURE — 97161 PT EVAL LOW COMPLEX 20 MIN: CPT

## 2018-10-09 NOTE — PROGRESS NOTES
PT Evaluation     Today's date: 10/9/2018  Patient name: Cami Castellanos  : 1960  MRN: [id number]  Referring provider: Burton Vann DO  Dx:   Encounter Diagnosis     ICD-10-CM    1  Neck pain, musculoskeletal M54 2 Ambulatory referral to Physical Therapy                  Assessment  Impairments: abnormal muscle firing, abnormal or restricted ROM, activity intolerance, impaired physical strength, lacks appropriate home exercise program, pain with function, scapular dyskinesis and poor posture     Assessment details: Pt is a pleasant 63 y o  female who presents to [address] with neck pain w/o radicular sx of insidious onset  Pain has been ongoing for many years  Today, she presents with decreased and painful cervical ROM and joint mobility, decreased B UE and postural strength/awareness, decreased postural control, and high self reports of pain w/ activity  Functionally, she is limited in her ability to sleep through the night, turn to look over shoulder (especially while driving), perform normal work activities for full day, and participate in normal recreational activities  She is motivated to improve  Pt will benefit from skilled PT to address the aforementioned deficits and limitations in an effort to maximize pain free functional mobility and overall quality of life  Progress as able with these goals in mind  Understanding of Dx/Px/POC: good   Prognosis: good    Goals  Short term goals (3 weeks):  1) Pt will improve cervical ROM to WNL w/o pain  2) Pt will improve B UE and postural strength grades by 1/3 grade MMT  3) Pt will report pain at worse <3/10  4) Pt will initiate and progress HEP w/ special emphasis on functional cervical ROM and postural control  Long term goals (6 weeks)  1) Pt will improve FOTO to at least 75   2) Pt will sleep through the night in positioning of choosing 6/7 nights a week w/o waking due to pain    3) Pt will perform full day of work w/ improved postural awareness and no pain  4) Pt will be independent and compliant w/ HEP in order to maximize functional benefit of skilled PT following d/c        Plan  Patient would benefit from: skilled PT  Planned modality interventions: cryotherapy and thermotherapy: hydrocollator packs  Planned therapy interventions: abdominal trunk stabilization, therapeutic activities, therapeutic exercise, therapeutic training, graded motor, graded exercise, graded activity, patient education, postural training, neuromuscular re-education, manual therapy, joint mobilization, activity modification, ADL retraining, body mechanics training, home exercise program, stretching and strengthening  Frequency: 2x week  Duration in visits: 12  Duration in weeks: 6  Treatment plan discussed with: patient  Plan details: POC to include: heat, DTM and TPR, mobs and traction, postural stab    HEP to start: see scanned handout           Subjective Evaluation    History of Present Illness  Mechanism of injury: Pt has had L sided neck and posterior shoulder pain for many years, notes no TAMIKA  Pt takes Advil for pain relief, used to be once a week  Notes that this has been almost every day recently  Works in insurance, mostly desk work  Full day of work activities causes increases in pain  Reports that sleeping is difficult s/t continued pain through night  Has tried a million different pillows w/o success   Pt functional status to follow:  Quality of life: good    Pain  Current pain ratin  At best pain ratin  At worst pain ratin  Location: L posterior shoulder along medial border of scapula   Quality: burning and dull ache  Relieving factors: medications and rest  Aggravating factors: overhead activity and lifting  Progression: no change    Social Support  Steps to enter house: yes  Stairs in house: yes   Lives in: multiple-level home  Lives with: spouse and adult children (takes care of 80year old mother )    Employment status: working ([employer], desk work)  Patient Goals  Patient goals for therapy: increased strength, decreased pain and increased motion  Patient goal: relieve discomofort through L posterior shoulder         Objective     Postural Observations  Seated posture: fair  Standing posture: fair  Correction of posture: makes symptoms better        Palpation     Additional Palpation Details  Mod increase in tissue density through L side MT and distal insertion of LS  Min/mod TTP through these areas as well    Neurological Testing     Sensation   Cervical/Thoracic   Left   Intact: light touch    Right   Intact: light touch    Active Range of Motion   Cervical/Thoracic Spine   Cervical    Left rotation: 35 degrees   Right rotation: 45 degrees     Additional Active Range of Motion Details  SB limited by 50% B w/ min pain  Flex and ext WNL w/o pain    Joint Play   Comments: Grossly WNL    Strength/Myotome Testing     Left Shoulder     Planes of Motion   Flexion: 4   Extension: 4   Abduction: 4   Adduction: 4   External rotation at 0°: 4   Internal rotation at 0°: 4     Right Shoulder     Planes of Motion   Flexion: 4   Extension: 4   Abduction: 4   Adduction: 4   External rotation at 0°: 4   Internal rotation at 0°: 4     Left Elbow   Flexion: 4  Extension: 4    Right Elbow   Flexion: 4  Extension: 4    Additional Strength Details  MT and LT grossly 4-/5 w/o pain    Tests   Cervical   Negative repeated extension and repeated flexion  Left   Negative Spurling's sign  Right   Negative Spurling's sign       Additional Tests Details  Manual cervical traction provides good relief       Flowsheet Rows      Most Recent Value   PT/OT G-Codes   Current Score  64   Projected Score  71   FOTO information reviewed  Yes   Assessment Type  Evaluation   G code set  Changing & Maintaining Body Position   Changing and Maintaining Body Position Current Status ()  CJ   Changing and Maintaining Body Position Goal Status ()  CJ          Precautions: standard Specialty Daily Treatment Diary     Manual         DTM and TPR to L UT/LS and MT        Cervical traction                                     Exercise Diary         UT and LS stretching        Chin tucks        Rows and ext        Wall posture        Walk outs        Cervical isometrics        Horizontal abd w/ scap sq                                                                                                                    Modalities        Heat pre        Ice post

## 2018-10-17 ENCOUNTER — OFFICE VISIT (OUTPATIENT)
Dept: PHYSICAL THERAPY | Facility: CLINIC | Age: 58
End: 2018-10-17
Payer: COMMERCIAL

## 2018-10-17 DIAGNOSIS — M54.2 NECK PAIN, MUSCULOSKELETAL: Primary | ICD-10-CM

## 2018-10-17 PROCEDURE — 97110 THERAPEUTIC EXERCISES: CPT

## 2018-10-17 NOTE — PROGRESS NOTES
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       Objective: See treatment diary below      Precautions: standard     Specialty Daily Treatment Diary     Manual  2       DTM and TPR to L UT/LS and MT X 10 min to left scalenes & UT/LS seated        Cervical traction  Not today                                    Exercise Diary  10-17        UT and LS stretching 3 x 30 sec ea        Chin tucks X 10 w mirror & x 10 supine        Rows and ext GTB 2 x 10 ea        Wall posture Reviewed        Walk outs NV        Cervical isometrics NV        Horizontal abd w/ scap sq NV                                Education  Transfer sit to supine         Neck support - towel roll in pillow case                                                                            Modalities        Heat pre X 10 min seated        Ice post                           Assessment: Tolerated treatment well  Patient would benefit from continued PT; pt with significant left UT & scalene tightness; good recall of correct technique performing cervical stretches; good response to cervical retractions; reported increased relief post therex with tband        Plan: Continue per plan of care   Issue GTB & handout for scap retract, shldr ext & horiz abd w scap sq

## 2018-10-18 ENCOUNTER — OFFICE VISIT (OUTPATIENT)
Dept: PHYSICAL THERAPY | Facility: CLINIC | Age: 58
End: 2018-10-18
Payer: COMMERCIAL

## 2018-10-18 ENCOUNTER — IMMUNIZATION (OUTPATIENT)
Dept: FAMILY MEDICINE CLINIC | Facility: CLINIC | Age: 58
End: 2018-10-18
Payer: COMMERCIAL

## 2018-10-18 DIAGNOSIS — M54.2 NECK PAIN, MUSCULOSKELETAL: Primary | ICD-10-CM

## 2018-10-18 DIAGNOSIS — Z23 FLU VACCINE NEED: Primary | ICD-10-CM

## 2018-10-18 PROCEDURE — 90471 IMMUNIZATION ADMIN: CPT

## 2018-10-18 PROCEDURE — 97110 THERAPEUTIC EXERCISES: CPT

## 2018-10-18 PROCEDURE — 90682 RIV4 VACC RECOMBINANT DNA IM: CPT

## 2018-10-18 NOTE — PROGRESS NOTES
Daily Note     Today's date: 10/18/2018  Patient name: Gokul Pond  : 1960  MRN: [id number]  Referring provider: Kourtney Santillan DO  Dx:   Encounter Diagnosis     ICD-10-CM    1  Neck pain, musculoskeletal M54 2        Start Time: 1145  Stop Time: 1240  Total time in clinic (min): 55 minutes    Subjective: Pt states she had approx a few hours relief post last session; took OTC medication prior to bed     Objective: See treatment diary below      Precautions: standard     Specialty Daily Treatment Diary     Manual  2 3      DTM and TPR to L UT/LS and MT X 10 min to left scalenes & UT/LS seated  X 10 min seated       Cervical traction  Not today                                    Exercise Diary  10-17  10-18       UT and LS stretching 3 x 30 sec ea  Self 5 x 10 sec ea       Chin tucks X 10 w mirror & x 10 supine  Seated x 10 & supine x 10       Rows and ext GTB 2 x 10 ea  GTB 2 x 10 ea       Wall posture Reviewed  Reviewed       Walk outs NV        Cervical isometrics NV        Horizontal abd w/ scap sq NV  RTB x 10                               Education  Transfer sit to supine         Neck support - towel roll in pillow case          Pt issued GTB for scap retr & shldr ext                 Education on form for supine triceps & stance biceps                                                   Modalities        Heat pre X 10 min seated  X 10 min seated       Ice post                             Assessment: Tolerated treatment well  Patient would benefit from continued PT; pt still with Left  UT trigger points & tight scalenes; pt with good recall for correct technique with yesterday's therex; corrected patient 's form for biceps curl that increases shldr pain; instructed patient to avoid overhead tricep press at this time        Plan: Continue per plan of care    Pt issued GTB for scap rows & B shldr ext

## 2018-10-23 ENCOUNTER — OFFICE VISIT (OUTPATIENT)
Dept: PHYSICAL THERAPY | Facility: CLINIC | Age: 58
End: 2018-10-23
Payer: COMMERCIAL

## 2018-10-23 ENCOUNTER — OFFICE VISIT (OUTPATIENT)
Dept: FAMILY MEDICINE CLINIC | Facility: CLINIC | Age: 58
End: 2018-10-23
Payer: COMMERCIAL

## 2018-10-23 VITALS
BODY MASS INDEX: 19.16 KG/M2 | TEMPERATURE: 98.1 F | RESPIRATION RATE: 16 BRPM | HEART RATE: 64 BPM | SYSTOLIC BLOOD PRESSURE: 110 MMHG | WEIGHT: 115 LBS | HEIGHT: 65 IN | DIASTOLIC BLOOD PRESSURE: 62 MMHG

## 2018-10-23 DIAGNOSIS — R22.30 AXILLARY FULLNESS: Primary | ICD-10-CM

## 2018-10-23 DIAGNOSIS — R92.2 DENSE BREASTS: ICD-10-CM

## 2018-10-23 DIAGNOSIS — Z12.39 BREAST CANCER SCREENING: ICD-10-CM

## 2018-10-23 DIAGNOSIS — M54.2 NECK PAIN, MUSCULOSKELETAL: Primary | ICD-10-CM

## 2018-10-23 PROBLEM — R92.30 DENSE BREASTS: Status: ACTIVE | Noted: 2018-10-23

## 2018-10-23 PROCEDURE — 97110 THERAPEUTIC EXERCISES: CPT

## 2018-10-23 PROCEDURE — 97140 MANUAL THERAPY 1/> REGIONS: CPT

## 2018-10-23 PROCEDURE — 99214 OFFICE O/P EST MOD 30 MIN: CPT | Performed by: FAMILY MEDICINE

## 2018-10-23 PROCEDURE — 3008F BODY MASS INDEX DOCD: CPT | Performed by: FAMILY MEDICINE

## 2018-10-23 PROCEDURE — 1036F TOBACCO NON-USER: CPT | Performed by: FAMILY MEDICINE

## 2018-10-23 RX ORDER — IBUPROFEN 200 MG
TABLET ORAL EVERY 6 HOURS PRN
COMMUNITY
End: 2021-03-23

## 2018-10-23 NOTE — PROGRESS NOTES
Daily Note     Today's date: 10/23/2018  Patient name: Radha Rincon  : 1960  MRN: [id number]  Referring provider: Phoenix Johnson DO  Dx:   Encounter Diagnosis     ICD-10-CM    1  Neck pain, musculoskeletal M54 2        Start Time: 1705  Stop Time: 1800  Total time in clinic (min): 55 minutes    Subjective: Pt states her neck continues to feel tight - maybe slightly better - not seeing significant changes as yet - still takes an Advil prior to going to bed;  doing the exercises     Objective: See treatment diary below      Precautions: standard     Specialty Daily Treatment Diary     Manual  2 3 4     DTM and TPR to L UT/LS and MT X 10 min to left scalenes & UT/LS seated  X 10 min seated  STM supine left scalenes x 5 min      Cervical traction  Not today   X 5 min      C/S stretching    Supine - left SCM, scalenes x 5 min                          Exercise Diary  10-17  10-18  10-23      UT and LS stretching 3 x 30 sec ea  Self 5 x 10 sec ea  Self 5 x 10 sec      Chin tucks X 10 w mirror & x 10 supine  Seated x 10 & supine x 10  Seated x 10 & supine x 10      Rows and ext GTB 2 x 10 ea  GTB 2 x 10 ea  GTB 3 x 10 ea      Wall posture Reviewed  Reviewed       Walk outs NV        Cervical isometrics NV        Horizontal abd w/ scap sq NV  RTB x 10                               Education  Transfer sit to supine   Pt instructed in self left  SCM stretch w OP       Neck support - towel roll in pillow case          Pt issued GTB for scap retr & shldr ext                 Education on form for supine triceps & stance biceps                                                   Modalities        Heat pre X 10 min seated  X 10 min seated  X 10 min supine      Ice post                             Assessment: Tolerated treatment well   Patient would benefit from continued PT; pt presents with extremely tight scalenes ( cord like ) - only on the left; tolerated well SCM stretch - manual & self - performed & instructed in  by primary therapist; pt reported increased relief post session       Plan: Continue per plan of care   Pt to add SCM stretch to home program; pt only able to attend 1 x/week due to her schedule; next appt 10-30-18

## 2018-10-23 NOTE — PROGRESS NOTES
Assessment/Plan:    No problem-specific Assessment & Plan notes found for this encounter  Fullness, r/o lipomatous change vs muscle strain vs lymphadenopathy  mammo order update  F/u if no better  Dense breasts per last mammo in [facility]  Activities as tolerated  No arm swelling or sign of infection or dvt     Diagnoses and all orders for this visit:    Axillary fullness  -     US extremity soft tissue; Future    Breast cancer screening  -     Mammo screening bilateral w 3d & cad; Future    Dense breasts  -     Mammo screening bilateral w 3d & cad; Future    Other orders  -     ibuprofen (MOTRIN) 200 mg tablet; Take by mouth every 6 (six) hours as needed for mild pain              No Follow-up on file  Subjective:      Patient ID: Hollie Rodriguez is a 63 y o  female  Chief Complaint   Patient presents with    Arm Pit     per pt states the tissue is "puffy" and a little sore  pt states recieved flu vaccine on Thursday       HPI  Flu shot 5d ago  right axilla fullness  Somewhat tender  Fullness started 3d after flu shot  Nothing anywhere else  No bug bites or itch  No chills  No breast issues, dc, skin changes  mammo due nov  Shaves w/o changes  No arm discoloration or swelling or joint stiffness      The following portions of the patient's history were reviewed and updated as appropriate: allergies, current medications, past family history, past medical history, past social history, past surgical history and problem list     Review of Systems   Constitutional: Negative for chills, fever and unexpected weight change  Current Outpatient Prescriptions   Medication Sig Dispense Refill    DiphenhydrAMINE HCl (BENADRYL ALLERGY PO) Take by mouth      ibuprofen (MOTRIN) 200 mg tablet Take by mouth every 6 (six) hours as needed for mild pain       No current facility-administered medications for this visit          Objective:    /62   Pulse 64   Temp 98 1 °F (36 7 °C)   Resp 16   Ht 5' 4 5" (1 638 m) Wt 52 2 kg (115 lb)   BMI 19 43 kg/m²        Physical Exam   Constitutional: She appears well-developed  HENT:   Head: Normocephalic  Eyes: Conjunctivae are normal    Neck: Neck supple  Cardiovascular: Normal rate and intact distal pulses  Pulmonary/Chest: Effort normal  No respiratory distress  Abdominal: Soft  Musculoskeletal: She exhibits no edema, tenderness or deformity  Right axillary fullness w/o discrete mass, nontender, no hidradenitis   Lymphadenopathy:     She has no cervical adenopathy  Neurological: She is alert  Skin: Skin is warm and dry  No rash noted  No pallor  Psychiatric: Her behavior is normal  Thought content normal    Nursing note and vitals reviewed  No cervical, supraclavicular, left axillary or inguinal lymphadenopathy           Kem Jacobs DO

## 2018-10-24 ENCOUNTER — TRANSCRIBE ORDERS (OUTPATIENT)
Dept: ADMINISTRATIVE | Facility: HOSPITAL | Age: 58
End: 2018-10-24

## 2018-10-24 DIAGNOSIS — R22.30 AXILLARY FULLNESS: Primary | ICD-10-CM

## 2018-10-25 ENCOUNTER — APPOINTMENT (OUTPATIENT)
Dept: PHYSICAL THERAPY | Facility: CLINIC | Age: 58
End: 2018-10-25
Payer: COMMERCIAL

## 2018-10-26 ENCOUNTER — TELEPHONE (OUTPATIENT)
Dept: FAMILY MEDICINE CLINIC | Facility: CLINIC | Age: 58
End: 2018-10-26

## 2018-10-26 DIAGNOSIS — R92.2 DENSE BREASTS: ICD-10-CM

## 2018-10-26 DIAGNOSIS — Z12.39 BREAST CANCER SCREENING: Primary | ICD-10-CM

## 2018-10-26 DIAGNOSIS — R22.30 AXILLARY FULLNESS: ICD-10-CM

## 2018-10-27 NOTE — TELEPHONE ENCOUNTER
Pt aware routine mammo was ordered, she will  Monday  She will not be using [facility] due to scheduling   Up front no further action

## 2018-10-30 ENCOUNTER — APPOINTMENT (OUTPATIENT)
Dept: PHYSICAL THERAPY | Facility: CLINIC | Age: 58
End: 2018-10-30
Payer: COMMERCIAL

## 2018-11-06 ENCOUNTER — OFFICE VISIT (OUTPATIENT)
Dept: PHYSICAL THERAPY | Facility: CLINIC | Age: 58
End: 2018-11-06
Payer: COMMERCIAL

## 2018-11-06 DIAGNOSIS — M54.2 NECK PAIN, MUSCULOSKELETAL: Primary | ICD-10-CM

## 2018-11-06 PROCEDURE — 97110 THERAPEUTIC EXERCISES: CPT

## 2018-11-06 PROCEDURE — G8983 BODY POS D/C STATUS: HCPCS

## 2018-11-06 PROCEDURE — 97140 MANUAL THERAPY 1/> REGIONS: CPT

## 2018-11-06 PROCEDURE — G8982 BODY POS GOAL STATUS: HCPCS

## 2018-11-06 NOTE — PROGRESS NOTES
Daily Note     Today's date: 2018  Patient name: Suhail Cordon  : 1960  MRN: [id number]  Referring provider: Kevin Tim DO  Dx:   Encounter Diagnosis     ICD-10-CM    1   Neck pain, musculoskeletal M54 2        Start Time:   Stop Time:   Total time in clinic (min): 55 minutes    Subjective: Pt states she continues to have pain at the top of her left shldr blade that requires her to take an Advil at night in order to sleep; reports she is doing the exercises & trying to be attentive to her posture alma at work but knows she gets busy & it is hard to maintain     Objective: See treatment diary below      Precautions: standard     Specialty Daily Treatment Diary     Manual  2 3 4 5    DTM and TPR to L UT/LS and MT X 10 min to left scalenes & UT/LS seated  X 10 min seated  STM supine left scalenes x 5 min  STM supine & seated  Left scalenes/UT & rhomboids x 10  min     Cervical traction  Not today   X 5 min  X 5 min     C/S stretching    Supine - left SCM, scalenes x 5 min  X 5 min                         Exercise Diary  10-17  10-18  10-23  11-6    UT and LS stretching 3 x 30 sec ea  Self 5 x 10 sec ea  Self 5 x 10 sec  Self 5 x 10 sec     Chin tucks X 10 w mirror & x 10 supine  Seated x 10 & supine x 10  Seated x 10 & supine x 10  Supine x 10     Rows and ext GTB 2 x 10 ea  GTB 2 x 10 ea  GTB 3 x 10 ea  Home     Wall posture Reviewed  Reviewed       Walk outs NV        Cervical isometrics NV        Horizontal abd w/ scap sq NV  RTB x 10   Home         Scalene stretch seated 5 x 10 sec                     Education  Transfer sit to supine   Pt instructed in self left  SCM stretch w OP       Neck support - towel roll in pillow case          Pt issued GTB for scap retr & shldr ext                 Education on form for supine triceps & stance biceps                                                   Modalities        Heat pre X 10 min seated  X 10 min seated  X 10 min supine  X 10 min seated     Ice post                             Assessment: Tolerated treatment well  Patient would benefit from continued PT; pt continues to have extremely tight left  scalenes, UT, cervical & upper TS paraspinals; pt reported the trigger point in her scapula released post session       Plan: Continue per plan of care   Pt can only come 1 x /week due to her schedule; next appt 11-13 -18

## 2018-11-13 ENCOUNTER — APPOINTMENT (OUTPATIENT)
Dept: PHYSICAL THERAPY | Facility: CLINIC | Age: 58
End: 2018-11-13
Payer: COMMERCIAL

## 2018-11-20 ENCOUNTER — APPOINTMENT (OUTPATIENT)
Dept: PHYSICAL THERAPY | Facility: CLINIC | Age: 58
End: 2018-11-20
Payer: COMMERCIAL

## 2018-11-23 ENCOUNTER — TELEPHONE (OUTPATIENT)
Dept: FAMILY MEDICINE CLINIC | Facility: CLINIC | Age: 58
End: 2018-11-23

## 2018-11-23 DIAGNOSIS — Z12.39 BREAST CANCER SCREENING: Primary | ICD-10-CM

## 2018-11-23 NOTE — TELEPHONE ENCOUNTER
DR Sherin Griffiths     Please order Screening Mammogram bilateral no 3d  Patient needs to go today at 6

## 2018-11-27 ENCOUNTER — APPOINTMENT (OUTPATIENT)
Dept: PHYSICAL THERAPY | Facility: CLINIC | Age: 58
End: 2018-11-27
Payer: COMMERCIAL

## 2018-11-27 DIAGNOSIS — R22.30 AXILLARY FULLNESS: ICD-10-CM

## 2018-11-30 NOTE — PROGRESS NOTES
Update 11/29/2018: Pt has never returned for follow ups and will be d/c due to non complaince w/ treatment program

## 2019-03-19 ENCOUNTER — OFFICE VISIT (OUTPATIENT)
Dept: FAMILY MEDICINE CLINIC | Facility: CLINIC | Age: 59
End: 2019-03-19
Payer: COMMERCIAL

## 2019-03-19 VITALS
BODY MASS INDEX: 19.03 KG/M2 | RESPIRATION RATE: 18 BRPM | HEIGHT: 65 IN | TEMPERATURE: 98.1 F | SYSTOLIC BLOOD PRESSURE: 124 MMHG | WEIGHT: 114.2 LBS | HEART RATE: 76 BPM | DIASTOLIC BLOOD PRESSURE: 74 MMHG

## 2019-03-19 DIAGNOSIS — H92.02 EAR PAIN, LEFT: Primary | ICD-10-CM

## 2019-03-19 DIAGNOSIS — H61.23 BILATERAL IMPACTED CERUMEN: ICD-10-CM

## 2019-03-19 PROBLEM — R22.30 AXILLARY FULLNESS: Status: RESOLVED | Noted: 2018-10-23 | Resolved: 2019-03-19

## 2019-03-19 PROCEDURE — 69210 REMOVE IMPACTED EAR WAX UNI: CPT | Performed by: FAMILY MEDICINE

## 2019-03-19 PROCEDURE — 99213 OFFICE O/P EST LOW 20 MIN: CPT | Performed by: FAMILY MEDICINE

## 2019-03-19 PROCEDURE — 1036F TOBACCO NON-USER: CPT | Performed by: FAMILY MEDICINE

## 2019-03-19 PROCEDURE — 3008F BODY MASS INDEX DOCD: CPT | Performed by: FAMILY MEDICINE

## 2019-03-19 NOTE — PROGRESS NOTES
Assessment/Plan:    No problem-specific Assessment & Plan notes found for this encounter  Hearing difficulty resolved  Ear pain improved  Ear wax treated b/l  Prevention discussed     Diagnoses and all orders for this visit:    Ear pain, left    Bilateral impacted cerumen  -     Ear cerumen removal        Ear cerumen removal  Date/Time: 3/19/2019 4:45 PM  Performed by: Martin Harrison DO  Authorized by: Martin Harrison DO     Patient location:  Clinic  Indications / Diagnosis:  B/l cerumen  Other Assisting Provider: No    Consent:     Consent obtained:  Verbal    Consent given by:  Patient    Risks discussed:  Bleeding, infection, pain, TM perforation, incomplete removal and dizziness    Alternatives discussed:  No treatment  Universal protocol:     Patient identity confirmed:  Verbally with patient  Procedure details:     Local anesthetic:  None    Location:  L ear and R ear    Procedure type: curette      Approach:  External    Equipment used: And irrigation  Post-procedure details:     Complication:  None    Hearing quality:  Normal    Patient tolerance of procedure: Tolerated well, no immediate complications              No follow-ups on file  Subjective:      Patient ID: Claudia Galvan is a 63 y o  female  Chief Complaint   Patient presents with    Earache     clogged   Nasal Congestion     bchurch lpn       HPI  Left ear clogged  Relatively suddenly  Some pain in ear  No odor or dc  No f/c  No probs in past with ear but clogged intermittently in past  No dizziness or neck pain    The following portions of the patient's history were reviewed and updated as appropriate: allergies, current medications, past family history, past medical history, past social history, past surgical history and problem list     Review of Systems   Constitutional: Negative for chills and fever  Neurological: Negative for dizziness           Current Outpatient Medications   Medication Sig Dispense Refill    DiphenhydrAMINE HCl (BENADRYL ALLERGY PO) Take by mouth      ibuprofen (MOTRIN) 200 mg tablet Take by mouth every 6 (six) hours as needed for mild pain       No current facility-administered medications for this visit  Objective:    /74   Pulse 76   Temp 98 1 °F (36 7 °C)   Resp 18   Ht 5' 4 5" (1 638 m)   Wt 51 8 kg (114 lb 3 2 oz)   BMI 19 30 kg/m²        Physical Exam   Constitutional: She appears well-developed  HENT:   Head: Normocephalic  B/l cerumen impaction, TMs normal after flush   Eyes: Conjunctivae are normal    Neck: Neck supple  Cardiovascular: Normal rate, regular rhythm and intact distal pulses  No murmur heard  Pulmonary/Chest: Effort normal  No respiratory distress  Abdominal: Soft  Musculoskeletal: She exhibits no edema or deformity  Lymphadenopathy:     She has no cervical adenopathy  Neurological: She is alert  Skin: Skin is warm and dry  Psychiatric: Her behavior is normal  Thought content normal    Nursing note and vitals reviewed               Sherie Ulloa DO

## 2019-08-06 PROBLEM — L72.0 WEN: Status: ACTIVE | Noted: 2019-08-06

## 2019-08-07 ENCOUNTER — OFFICE VISIT (OUTPATIENT)
Dept: FAMILY MEDICINE CLINIC | Facility: CLINIC | Age: 59
End: 2019-08-07
Payer: COMMERCIAL

## 2019-08-07 VITALS
HEIGHT: 65 IN | HEART RATE: 76 BPM | DIASTOLIC BLOOD PRESSURE: 70 MMHG | TEMPERATURE: 99.3 F | WEIGHT: 116 LBS | RESPIRATION RATE: 16 BRPM | SYSTOLIC BLOOD PRESSURE: 100 MMHG | BODY MASS INDEX: 19.33 KG/M2

## 2019-08-07 DIAGNOSIS — L72.0 WEN: Primary | ICD-10-CM

## 2019-08-07 PROCEDURE — 99213 OFFICE O/P EST LOW 20 MIN: CPT | Performed by: FAMILY MEDICINE

## 2019-08-07 PROCEDURE — 1036F TOBACCO NON-USER: CPT | Performed by: FAMILY MEDICINE

## 2019-08-07 PROCEDURE — 21012 EXC FACE LES SBQ 2 CM/>: CPT | Performed by: FAMILY MEDICINE

## 2019-08-07 NOTE — PROGRESS NOTES
Assessment/Plan:    No problem-specific Assessment & Plan notes found for this encounter  CPT 10665    Tolerated procedure well  Sent to path  Suture removal in 7-10d     Diagnoses and all orders for this visit:    Cifuentes Brain  -     Pathology Report  -     Biopsy    Other orders  -     Cancel: Skin excision  -     Cancel: Skin excision          Biopsy  Date/Time: 8/7/2019 6:05 PM  Performed by: Flora Greene DO  Authorized by: Flora Greene DO     Procedure Details - Skin Biopsy:     Biopsy tissue type: skin and subcutaneous    Biopsy method: incisional      Body area:  Head/neck    Head/neck location:  Scalp    Initial size (mm):  25    Final defect size (mm):  25    Malignancy: benign lesion       Under sterile technique  Local anesthesia 1% lido with epi  11 blade use to made 25mm incision  Mass was 23mm  Sent to path  Tolerated well w/o complications  Aftercare advised            No follow-ups on file  Subjective:      Patient ID: Lorena Hansen is a 63 y o  female  Chief Complaint   Patient presents with    lesion removal     wmcma       HPI    Scalp lesion  Growing  Tender at times  No hx of infection or bleeding  No headache or throbbing    The following portions of the patient's history were reviewed and updated as appropriate: allergies, current medications, past family history, past medical history, past social history, past surgical history and problem list     Review of Systems   Constitutional: Negative for fever  Neurological: Negative for dizziness and headaches  Current Outpatient Medications   Medication Sig Dispense Refill    DiphenhydrAMINE HCl (BENADRYL ALLERGY PO) Take by mouth      ibuprofen (MOTRIN) 200 mg tablet Take by mouth every 6 (six) hours as needed for mild pain       No current facility-administered medications for this visit          Objective:    /70   Pulse 76   Temp 99 3 °F (37 4 °C)   Resp 16   Ht 5' 4 5" (1 638 m)   Wt 52 6 kg (116 lb)   BMI 19 60 kg/m²        Physical Exam   Constitutional: She appears well-developed  No distress  HENT:   Head: Normocephalic  Mouth/Throat: No oropharyngeal exudate  Eyes: Conjunctivae are normal  No scleral icterus  Neck: Neck supple  Cardiovascular: Normal rate and intact distal pulses  Pulmonary/Chest: Effort normal  No stridor  No respiratory distress  Abdominal: Soft  Musculoskeletal: She exhibits no edema or deformity  Lymphadenopathy:     She has no cervical adenopathy  Neurological: She is alert  Skin: Skin is warm and dry  No pallor  2 3 cm subcutaneous scalp mass   Psychiatric: Her behavior is normal  Thought content normal    Nursing note and vitals reviewed               Mikayla Bruno DO

## 2019-08-11 LAB
PATH REPORT.SITE OF ORIGIN SPEC: NORMAL
PAYMENT PROCEDURE: NORMAL
SL AMB .: NORMAL

## 2019-08-14 ENCOUNTER — OFFICE VISIT (OUTPATIENT)
Dept: FAMILY MEDICINE CLINIC | Facility: CLINIC | Age: 59
End: 2019-08-14

## 2019-08-14 VITALS
WEIGHT: 116 LBS | TEMPERATURE: 98.8 F | BODY MASS INDEX: 19.33 KG/M2 | SYSTOLIC BLOOD PRESSURE: 116 MMHG | HEIGHT: 65 IN | DIASTOLIC BLOOD PRESSURE: 80 MMHG | HEART RATE: 68 BPM | RESPIRATION RATE: 16 BRPM

## 2019-08-14 DIAGNOSIS — Z48.02 VISIT FOR SUTURE REMOVAL: Primary | ICD-10-CM

## 2019-08-14 PROCEDURE — 3008F BODY MASS INDEX DOCD: CPT | Performed by: FAMILY MEDICINE

## 2019-08-14 PROCEDURE — 99024 POSTOP FOLLOW-UP VISIT: CPT | Performed by: FAMILY MEDICINE

## 2019-08-15 NOTE — PROGRESS NOTES
Assessment/Plan:    No problem-specific Assessment & Plan notes found for this encounter  Sutures removed  Tolerated well  Good healing  Aftercare advised    Pilar cyst on path  Copy of report given  F/u prn     Diagnoses and all orders for this visit:    Visit for suture removal              No follow-ups on file  Subjective:      Patient ID: Lita Jones is a 63 y o  female  Chief Complaint   Patient presents with   Lord Ridgel removal     wmcma       HPI  No pain/bleeding/pus/dc/f/c    The following portions of the patient's history were reviewed and updated as appropriate: allergies, current medications, past family history, past medical history, past social history, past surgical history and problem list     Review of Systems   Neurological: Negative for headaches  Current Outpatient Medications   Medication Sig Dispense Refill    DiphenhydrAMINE HCl (BENADRYL ALLERGY PO) Take by mouth      ibuprofen (MOTRIN) 200 mg tablet Take by mouth every 6 (six) hours as needed for mild pain       No current facility-administered medications for this visit  Objective:    /80   Pulse 68   Temp 98 8 °F (37 1 °C)   Resp 16   Ht 5' 4 5" (1 638 m)   Wt 52 6 kg (116 lb)   BMI 19 60 kg/m²        Physical Exam   Skin: No rash noted  No pallor     Healing suture sites, no dehisence              Odette Gomez DO

## 2019-11-25 ENCOUNTER — TELEPHONE (OUTPATIENT)
Dept: FAMILY MEDICINE CLINIC | Facility: CLINIC | Age: 59
End: 2019-11-25

## 2019-11-25 DIAGNOSIS — Z12.31 VISIT FOR SCREENING MAMMOGRAM: Primary | ICD-10-CM

## 2020-01-08 NOTE — PROGRESS NOTES
FAMILY PRACTICE HEALTH MAINTENANCE OFFICE VISIT  [facility]    NAME: Damien Search  AGE: 63 y o  SEX: female  : 1960     DATE: 2020    Assessment and Plan     There are no diagnoses linked to this encounter  · Patient Counseling:   · Nutrition: Stressed importance of a well balanced diet, moderation of sodium/saturated fat, caloric balance and sufficient intake of fiber  · Exercise: Stressed the importance of regular exercise with a goal of 150 minutes per week  · Dental Health: Discussed daily flossing and brushing and regular dental visits     · Immunizations reviewed: Up To Date  · Discussed benefits of:  Colon Cancer Screening, Mammogram , Cervical Cancer screening and Screening labs  BMI Counseling: There is no height or weight on file to calculate BMI  Discussed with patient's BMI with her  The BMI normal   No follow-ups on file  Chief Complaint   No chief complaint on file  History of Present Illness     Here for CPE with pap  Has a lot of vaginal dryness and we discussed possible topical estrogen vaginally but she has a lot of family history of stroke and breast cancer and is not a good candidate  Otherwise feels well and has no complaints         Well Adult Physical   Patient here for a comprehensive physical exam       Diet and Physical Activity  Diet: well balanced diet  Exercise: frequently      Depression Screen  PHQ-9 Depression Screening    PHQ-9:    Frequency of the following problems over the past two weeks:               General Health  Hearing: Normal:  bilateral  Vision: no vision problems  Dental: regular dental visits    Reproductive Health  No issues  and Post-menopausal       The following portions of the patient's history were reviewed and updated as appropriate: allergies, current medications, past family history, past medical history, past social history, past surgical history and problem list     Review of Systems     Review of Systems   Constitutional: Negative  HENT: Negative  Eyes: Negative  Respiratory: Negative  Cardiovascular: Negative  Gastrointestinal: Negative  Endocrine: Negative  Genitourinary: Negative  Musculoskeletal: Negative  Skin: Negative  Allergic/Immunologic: Negative  Neurological: Negative  Hematological: Negative  Psychiatric/Behavioral: Negative  Past Medical History     Past Medical History:   Diagnosis Date    Pityriasis rosea        Past Surgical History     No past surgical history on file      Social History     Social History     Socioeconomic History    Marital status: /Civil Union     Spouse name: Not on file    Number of children: Not on file    Years of education: Not on file    Highest education level: Not on file   Occupational History    Not on file   Social Needs    Financial resource strain: Not on file    Food insecurity:     Worry: Not on file     Inability: Not on file    Transportation needs:     Medical: Not on file     Non-medical: Not on file   Tobacco Use    Smoking status: Never Smoker    Smokeless tobacco: Never Used   Substance and Sexual Activity    Alcohol use: Not on file    Drug use: Not on file    Sexual activity: Not on file   Lifestyle    Physical activity:     Days per week: Not on file     Minutes per session: Not on file    Stress: Not on file   Relationships    Social connections:     Talks on phone: Not on file     Gets together: Not on file     Attends Tenriism service: Not on file     Active member of club or organization: Not on file     Attends meetings of clubs or organizations: Not on file     Relationship status: Not on file    Intimate partner violence:     Fear of current or ex partner: Not on file     Emotionally abused: Not on file     Physically abused: Not on file     Forced sexual activity: Not on file   Other Topics Concern    Not on file   Social History Narrative    Not on file Family History     Family History   Problem Relation Age of Onset    Breast cancer Mother     Coronary artery disease Father        Current Medications       Current Outpatient Medications:     DiphenhydrAMINE HCl (BENADRYL ALLERGY PO), Take by mouth, Disp: , Rfl:     ibuprofen (MOTRIN) 200 mg tablet, Take by mouth every 6 (six) hours as needed for mild pain, Disp: , Rfl:      Allergies     Allergies   Allergen Reactions    Metaxalone Hives     Reaction Date: [id number];     Penicillins      Reaction Date: [id number]; Objective     There were no vitals taken for this visit  Physical Exam   Constitutional: She is oriented to person, place, and time  She appears well-developed and well-nourished  No distress  HENT:   Head: Normocephalic and atraumatic  Right Ear: External ear normal    Left Ear: External ear normal    Mouth/Throat: Oropharynx is clear and moist    Eyes: EOM are normal    Neck: Normal range of motion  Neck supple  No thyromegaly present  Cardiovascular: Normal rate, regular rhythm, normal heart sounds and intact distal pulses  Exam reveals no gallop and no friction rub  No murmur heard  Pulmonary/Chest: Effort normal and breath sounds normal  She has no wheezes  She has no rales  Abdominal: Soft  Bowel sounds are normal  She exhibits no mass  There is no tenderness  There is no rebound  Musculoskeletal: Normal range of motion  She exhibits no deformity  Lymphadenopathy:     She has no cervical adenopathy  Neurological: She is alert and oriented to person, place, and time  She has normal reflexes  She displays normal reflexes  No cranial nerve deficit  She exhibits normal muscle tone  Coordination normal    Skin: Skin is warm and dry  No rash noted  She is not diaphoretic  Psychiatric: She has a normal mood and affect   Her behavior is normal  Judgment and thought content normal          No exam data present        MD [facility]

## 2020-01-09 ENCOUNTER — OFFICE VISIT (OUTPATIENT)
Dept: FAMILY MEDICINE CLINIC | Facility: CLINIC | Age: 60
End: 2020-01-09
Payer: COMMERCIAL

## 2020-01-09 VITALS
RESPIRATION RATE: 16 BRPM | SYSTOLIC BLOOD PRESSURE: 120 MMHG | OXYGEN SATURATION: 99 % | WEIGHT: 115 LBS | HEIGHT: 65 IN | DIASTOLIC BLOOD PRESSURE: 60 MMHG | TEMPERATURE: 99.6 F | BODY MASS INDEX: 19.16 KG/M2 | HEART RATE: 78 BPM

## 2020-01-09 DIAGNOSIS — E78.5 DYSLIPIDEMIA: ICD-10-CM

## 2020-01-09 DIAGNOSIS — Z01.419 WELL WOMAN EXAM: Primary | ICD-10-CM

## 2020-01-09 DIAGNOSIS — Z12.11 COLON CANCER SCREENING: ICD-10-CM

## 2020-01-09 PROCEDURE — 3008F BODY MASS INDEX DOCD: CPT | Performed by: FAMILY MEDICINE

## 2020-01-09 PROCEDURE — 99396 PREV VISIT EST AGE 40-64: CPT | Performed by: INTERNAL MEDICINE

## 2020-01-10 ENCOUNTER — VBI (OUTPATIENT)
Dept: FAMILY MEDICINE CLINIC | Facility: CLINIC | Age: 60
End: 2020-01-10

## 2020-01-10 LAB
CYTOLOGIST CVX/VAG CYTO: NORMAL
DX ICD CODE: NORMAL
OTHER STN SPEC: NORMAL
OTHER STN SPEC: NORMAL
PATH REPORT.FINAL DX SPEC: NORMAL
SL AMB NOTE:: NORMAL
SL AMB SPECIMEN ADEQUACY: NORMAL
SL AMB TEST METHODOLOGY: NORMAL

## 2020-01-10 NOTE — LETTER
Procedure Request Form: Mammogram      Date Requested: 01/10/20  Patient: Steven Lopez  Patient : 1960   Referring Provider: Martin Ryan, MD        Date of Procedure ______________________________       The above patient has informed us that they have completed their   most recent Mammogram at your facility  Please complete   this form and attach all corresponding procedure reports/results  Comments __________________________________________________________  ____________________________________________________________________  ____________________________________________________________________  ____________________________________________________________________    Facility Completing Procedure _________________________________________    Form Completed By (print name) _______________________________________      Signature __________________________________________________________      These reports are needed for  compliance    Please fax this completed form and a copy of the procedure report to our office as soon as possible to [phone] marcell Hawkins: Phone [phone]    We thank you for your assistance in treating our mutual patient     (sent to [address] [facility])

## 2020-01-10 NOTE — TELEPHONE ENCOUNTER
Malcom Sam MD  [address]             [facility]      Faxed to Leila Espinoza [address] [phone] [facility] [phone] [place], [place] 01/10/20 8:00 AM     Received Mammogram DOS 12/4/2019, resulted Helena Solis MA 01/13/20 12:26 PM

## 2020-06-29 ENCOUNTER — TELEPHONE (OUTPATIENT)
Dept: FAMILY MEDICINE CLINIC | Facility: CLINIC | Age: 60
End: 2020-06-29

## 2020-06-29 DIAGNOSIS — Z20.828 EXPOSURE TO SARS-ASSOCIATED CORONAVIRUS: Primary | ICD-10-CM

## 2020-06-29 PROCEDURE — 99214 OFFICE O/P EST MOD 30 MIN: CPT | Performed by: FAMILY MEDICINE

## 2020-06-30 ENCOUNTER — CLINICAL SUPPORT (OUTPATIENT)
Dept: FAMILY MEDICINE CLINIC | Facility: CLINIC | Age: 60
End: 2020-06-30
Payer: COMMERCIAL

## 2020-06-30 VITALS — TEMPERATURE: 98 F

## 2020-06-30 DIAGNOSIS — Z23 NEED FOR VACCINATION: Primary | ICD-10-CM

## 2020-06-30 PROCEDURE — 90715 TDAP VACCINE 7 YRS/> IM: CPT

## 2020-06-30 PROCEDURE — 90471 IMMUNIZATION ADMIN: CPT

## 2020-10-17 ENCOUNTER — TELEPHONE (OUTPATIENT)
Dept: GASTROENTEROLOGY | Facility: CLINIC | Age: 60
End: 2020-10-17

## 2020-12-08 ENCOUNTER — TELEMEDICINE (OUTPATIENT)
Dept: FAMILY MEDICINE CLINIC | Facility: CLINIC | Age: 60
End: 2020-12-08
Payer: COMMERCIAL

## 2020-12-08 ENCOUNTER — TELEPHONE (OUTPATIENT)
Dept: FAMILY MEDICINE CLINIC | Facility: CLINIC | Age: 60
End: 2020-12-08

## 2020-12-08 DIAGNOSIS — B34.9 VIRAL INFECTION, UNSPECIFIED: Primary | ICD-10-CM

## 2020-12-08 PROCEDURE — 1036F TOBACCO NON-USER: CPT | Performed by: FAMILY MEDICINE

## 2020-12-08 PROCEDURE — 99213 OFFICE O/P EST LOW 20 MIN: CPT | Performed by: FAMILY MEDICINE

## 2020-12-09 ENCOUNTER — VBI (OUTPATIENT)
Dept: ADMINISTRATIVE | Facility: OTHER | Age: 60
End: 2020-12-09

## 2020-12-09 DIAGNOSIS — B34.9 VIRAL INFECTION, UNSPECIFIED: ICD-10-CM

## 2020-12-09 PROCEDURE — U0003 INFECTIOUS AGENT DETECTION BY NUCLEIC ACID (DNA OR RNA); SEVERE ACUTE RESPIRATORY SYNDROME CORONAVIRUS 2 (SARS-COV-2) (CORONAVIRUS DISEASE [COVID-19]), AMPLIFIED PROBE TECHNIQUE, MAKING USE OF HIGH THROUGHPUT TECHNOLOGIES AS DESCRIBED BY CMS-2020-01-R: HCPCS | Performed by: FAMILY MEDICINE

## 2020-12-11 LAB — SARS-COV-2 RNA SPEC QL NAA+PROBE: NOT DETECTED

## 2021-02-09 ENCOUNTER — VBI (OUTPATIENT)
Dept: ADMINISTRATIVE | Facility: OTHER | Age: 61
End: 2021-02-09

## 2021-03-23 ENCOUNTER — HOSPITAL ENCOUNTER (OUTPATIENT)
Facility: HOSPITAL | Age: 61
Setting detail: OBSERVATION
Discharge: HOME | End: 2021-03-24
Attending: EMERGENCY MEDICINE | Admitting: FAMILY MEDICINE
Payer: COMMERCIAL

## 2021-03-23 ENCOUNTER — APPOINTMENT (OUTPATIENT)
Dept: RADIOLOGY | Facility: HOSPITAL | Age: 61
End: 2021-03-23
Payer: COMMERCIAL

## 2021-03-23 DIAGNOSIS — R74.01 TRANSAMINITIS: ICD-10-CM

## 2021-03-23 DIAGNOSIS — K76.9 LIVER LESION: ICD-10-CM

## 2021-03-23 DIAGNOSIS — I47.1 PAROXYSMAL ATRIAL TACHYCARDIA (HCC): ICD-10-CM

## 2021-03-23 DIAGNOSIS — R77.8 ELEVATED TROPONIN: ICD-10-CM

## 2021-03-23 DIAGNOSIS — D72.829 LEUKOCYTOSIS: ICD-10-CM

## 2021-03-23 DIAGNOSIS — R74.8 ELEVATED LIVER ENZYMES: ICD-10-CM

## 2021-03-23 DIAGNOSIS — I47.1 SVT (SUPRAVENTRICULAR TACHYCARDIA) (HCC): Primary | ICD-10-CM

## 2021-03-23 LAB
ALBUMIN SERPL BCP-MCNC: 4.2 G/DL (ref 3.5–5)
ALP SERPL-CCNC: 132 U/L (ref 46–116)
ALT SERPL W P-5'-P-CCNC: 294 U/L (ref 12–78)
ANION GAP SERPL CALCULATED.3IONS-SCNC: 9 MMOL/L (ref 4–13)
APTT PPP: 25 SECONDS (ref 23–37)
AST SERPL W P-5'-P-CCNC: 217 U/L (ref 5–45)
BASOPHILS # BLD AUTO: 0.03 THOUSANDS/ΜL (ref 0–0.1)
BASOPHILS NFR BLD AUTO: 0 % (ref 0–1)
BILIRUB SERPL-MCNC: 0.5 MG/DL (ref 0.2–1)
BUN SERPL-MCNC: 21 MG/DL (ref 5–25)
CALCIUM SERPL-MCNC: 8.9 MG/DL (ref 8.3–10.1)
CHLORIDE SERPL-SCNC: 101 MMOL/L (ref 100–108)
CO2 SERPL-SCNC: 28 MMOL/L (ref 21–32)
CREAT SERPL-MCNC: 0.86 MG/DL (ref 0.6–1.3)
EOSINOPHIL # BLD AUTO: 0.07 THOUSAND/ΜL (ref 0–0.61)
EOSINOPHIL NFR BLD AUTO: 1 % (ref 0–6)
ERYTHROCYTE [DISTWIDTH] IN BLOOD BY AUTOMATED COUNT: 12.2 % (ref 11.6–15.1)
GFR SERPL CREATININE-BSD FRML MDRD: 74 ML/MIN/1.73SQ M
GLUCOSE SERPL-MCNC: 126 MG/DL (ref 65–140)
HCT VFR BLD AUTO: 47.4 % (ref 34.8–46.1)
HGB BLD-MCNC: 15.2 G/DL (ref 11.5–15.4)
IMM GRANULOCYTES # BLD AUTO: 0.03 THOUSAND/UL (ref 0–0.2)
IMM GRANULOCYTES NFR BLD AUTO: 0 % (ref 0–2)
INR PPP: 0.96 (ref 0.84–1.19)
LYMPHOCYTES # BLD AUTO: 2.91 THOUSANDS/ΜL (ref 0.6–4.47)
LYMPHOCYTES NFR BLD AUTO: 24 % (ref 14–44)
MAGNESIUM SERPL-MCNC: 2.3 MG/DL (ref 1.6–2.6)
MCH RBC QN AUTO: 29.2 PG (ref 26.8–34.3)
MCHC RBC AUTO-ENTMCNC: 32.1 G/DL (ref 31.4–37.4)
MCV RBC AUTO: 91 FL (ref 82–98)
MONOCYTES # BLD AUTO: 0.95 THOUSAND/ΜL (ref 0.17–1.22)
MONOCYTES NFR BLD AUTO: 8 % (ref 4–12)
NEUTROPHILS # BLD AUTO: 8.08 THOUSANDS/ΜL (ref 1.85–7.62)
NEUTS SEG NFR BLD AUTO: 67 % (ref 43–75)
NRBC BLD AUTO-RTO: 0 /100 WBCS
PLATELET # BLD AUTO: 288 THOUSANDS/UL (ref 149–390)
PMV BLD AUTO: 12.3 FL (ref 8.9–12.7)
POTASSIUM SERPL-SCNC: 3.8 MMOL/L (ref 3.5–5.3)
PROT SERPL-MCNC: 7.5 G/DL (ref 6.4–8.2)
PROTHROMBIN TIME: 12.7 SECONDS (ref 11.6–14.5)
RBC # BLD AUTO: 5.21 MILLION/UL (ref 3.81–5.12)
SODIUM SERPL-SCNC: 138 MMOL/L (ref 136–145)
TROPONIN I SERPL-MCNC: 0.12 NG/ML
TROPONIN I SERPL-MCNC: 0.36 NG/ML
TSH SERPL DL<=0.05 MIU/L-ACNC: 1.1 UIU/ML (ref 0.36–3.74)
WBC # BLD AUTO: 12.07 THOUSAND/UL (ref 4.31–10.16)

## 2021-03-23 PROCEDURE — 83735 ASSAY OF MAGNESIUM: CPT | Performed by: EMERGENCY MEDICINE

## 2021-03-23 PROCEDURE — 85610 PROTHROMBIN TIME: CPT | Performed by: EMERGENCY MEDICINE

## 2021-03-23 PROCEDURE — 99285 EMERGENCY DEPT VISIT HI MDM: CPT | Performed by: EMERGENCY MEDICINE

## 2021-03-23 PROCEDURE — 84484 ASSAY OF TROPONIN QUANT: CPT | Performed by: EMERGENCY MEDICINE

## 2021-03-23 PROCEDURE — 93005 ELECTROCARDIOGRAM TRACING: CPT

## 2021-03-23 PROCEDURE — 36415 COLL VENOUS BLD VENIPUNCTURE: CPT | Performed by: EMERGENCY MEDICINE

## 2021-03-23 PROCEDURE — 71045 X-RAY EXAM CHEST 1 VIEW: CPT

## 2021-03-23 PROCEDURE — 80053 COMPREHEN METABOLIC PANEL: CPT | Performed by: EMERGENCY MEDICINE

## 2021-03-23 PROCEDURE — 96374 THER/PROPH/DIAG INJ IV PUSH: CPT

## 2021-03-23 PROCEDURE — 85025 COMPLETE CBC W/AUTO DIFF WBC: CPT | Performed by: EMERGENCY MEDICINE

## 2021-03-23 PROCEDURE — 99285 EMERGENCY DEPT VISIT HI MDM: CPT

## 2021-03-23 PROCEDURE — 85730 THROMBOPLASTIN TIME PARTIAL: CPT | Performed by: EMERGENCY MEDICINE

## 2021-03-23 PROCEDURE — 96361 HYDRATE IV INFUSION ADD-ON: CPT

## 2021-03-23 PROCEDURE — 84443 ASSAY THYROID STIM HORMONE: CPT | Performed by: EMERGENCY MEDICINE

## 2021-03-23 RX ORDER — CALCIUM CARBONATE 200(500)MG
1000 TABLET,CHEWABLE ORAL DAILY PRN
Status: DISCONTINUED | OUTPATIENT
Start: 2021-03-23 | End: 2021-03-24 | Stop reason: HOSPADM

## 2021-03-23 RX ORDER — ASPIRIN 81 MG/1
324 TABLET, CHEWABLE ORAL ONCE
Status: COMPLETED | OUTPATIENT
Start: 2021-03-23 | End: 2021-03-24

## 2021-03-23 RX ORDER — ONDANSETRON 2 MG/ML
4 INJECTION INTRAMUSCULAR; INTRAVENOUS EVERY 6 HOURS PRN
Status: DISCONTINUED | OUTPATIENT
Start: 2021-03-23 | End: 2021-03-24 | Stop reason: HOSPADM

## 2021-03-23 RX ORDER — SODIUM CHLORIDE 9 MG/ML
75 INJECTION, SOLUTION INTRAVENOUS ONCE
Status: COMPLETED | OUTPATIENT
Start: 2021-03-23 | End: 2021-03-23

## 2021-03-23 RX ORDER — IBUPROFEN 200 MG
200 TABLET ORAL
COMMUNITY
End: 2021-03-24 | Stop reason: HOSPADM

## 2021-03-23 RX ORDER — DIPHENHYDRAMINE HCL 25 MG
25 TABLET ORAL
COMMUNITY

## 2021-03-23 RX ORDER — MELATONIN
1000 2 TIMES WEEKLY
COMMUNITY

## 2021-03-23 RX ORDER — ADENOSINE 3 MG/ML
6 INJECTION INTRAVENOUS ONCE
Status: COMPLETED | OUTPATIENT
Start: 2021-03-23 | End: 2021-03-23

## 2021-03-23 RX ORDER — ASCORBIC ACID 500 MG
500 TABLET ORAL 2 TIMES WEEKLY
COMMUNITY

## 2021-03-23 RX ADMIN — ADENOSINE 6 MG: 3 INJECTION INTRAVENOUS at 17:41

## 2021-03-23 RX ADMIN — SODIUM CHLORIDE 75 ML/HR: 0.9 INJECTION, SOLUTION INTRAVENOUS at 23:56

## 2021-03-23 RX ADMIN — SODIUM CHLORIDE 500 ML: 0.9 INJECTION, SOLUTION INTRAVENOUS at 17:35

## 2021-03-23 NOTE — ED PROVIDER NOTES
History  Chief Complaint   Patient presents with    Rapid Heart Rate     patient c/o heart palpitations x about 4 hours  states took Metoprolol 25 mg PO around 1300 that had been left over  63 yo female with remote history of SVT c/o feeling her heart racing since 12:30 pm today  No chest pain or sob but does feel anxious  No recent illness  No fever, cough, vomiting  She did take 25 mg of metoprolol at 1 pm but it didn't help  History provided by:  Patient   used: No    Rapid Heart Rate  Associated symptoms: no back pain, no chest pain, no cough, no dizziness, no nausea, no shortness of breath and no vomiting        None       Past Medical History:   Diagnosis Date    Pityriasis rosea     [facility]        History reviewed  No pertinent surgical history  Family History   Problem Relation Age of Onset    Breast cancer Mother     Coronary artery disease Father      I have reviewed and agree with the history as documented  E-Cigarette/Vaping     E-Cigarette/Vaping Substances     Social History     Tobacco Use    Smoking status: Never Smoker    Smokeless tobacco: Never Used   Substance Use Topics    Alcohol use: Not Currently    Drug use: Never       Review of Systems   Constitutional: Negative  Negative for fever  HENT: Negative  Eyes: Negative  Respiratory: Negative  Negative for cough and shortness of breath  Cardiovascular: Positive for palpitations  Negative for chest pain  Gastrointestinal: Negative  Negative for abdominal pain, diarrhea, nausea and vomiting  Genitourinary: Negative  Negative for dysuria and flank pain  Musculoskeletal: Negative  Negative for back pain and myalgias  Skin: Negative  Negative for rash  Neurological: Negative  Negative for dizziness and headaches  Hematological: Does not bruise/bleed easily  Psychiatric/Behavioral: Negative      All other systems reviewed and are negative  Physical Exam  Physical Exam  Vitals signs and nursing note reviewed  Constitutional:       General: She is not in acute distress  Appearance: She is well-developed and normal weight  She is not ill-appearing, toxic-appearing or diaphoretic  HENT:      Head: Normocephalic and atraumatic  Eyes:      Conjunctiva/sclera: Conjunctivae normal    Neck:      Musculoskeletal: Normal range of motion and neck supple  Cardiovascular:      Rate and Rhythm: Regular rhythm  Tachycardia present  Heart sounds: Normal heart sounds  No murmur  Pulmonary:      Effort: Pulmonary effort is normal  No respiratory distress  Breath sounds: Normal breath sounds  Abdominal:      General: There is no distension  Palpations: Abdomen is soft  Musculoskeletal: Normal range of motion  General: No deformity  Right lower leg: No edema  Left lower leg: No edema  Skin:     General: Skin is warm and dry  Coloration: Skin is not pale  Findings: No rash  Neurological:      General: No focal deficit present  Mental Status: She is alert and oriented to person, place, and time  Cranial Nerves: No cranial nerve deficit     Psychiatric:         Mood and Affect: Mood normal          Behavior: Behavior normal          Vital Signs  ED Triage Vitals [03/23/21 1725]   Temperature Pulse Respirations Blood Pressure SpO2   97 8 °F (36 6 °C) (!) 204 19 139/95 99 %      Temp Source Heart Rate Source Patient Position - Orthostatic VS BP Location FiO2 (%)   Tympanic Monitor Lying Right arm --      Pain Score       --           Vitals:    03/23/21 1930 03/23/21 1945 03/23/21 2000 03/23/21 2015   BP: 118/77 118/77 114/73 121/78   Pulse: 92 92  86   Patient Position - Orthostatic VS:             Visual Acuity      ED Medications  Medications   aspirin chewable tablet 324 mg (has no administration in time range)   sodium chloride 0 9 % bolus 500 mL (0 mL Intravenous Stopped 3/23/21 1835)   adenosine (ADENOCARD) injection 6 mg (6 mg Intravenous Given 3/23/21 1741)       Diagnostic Studies  Results Reviewed     Procedure Component Value Units Date/Time    Troponin I [[id number]]  (Abnormal) Collected: 03/23/21 2056    Lab Status: Final result Specimen: Blood from Arm, Left Updated: 03/23/21 2123     Troponin I 0 36 ng/mL     Magnesium [[id number]]  (Normal) Collected: 03/23/21 1745    Lab Status: Final result Specimen: Blood from Arm, Left Updated: 03/23/21 1826     Magnesium 2 3 mg/dL     TSH, 3rd generation with Free T4 reflex [[id number]]  (Normal) Collected: 03/23/21 1745    Lab Status: Final result Specimen: Blood from Arm, Left Updated: 03/23/21 1826     TSH 3RD GENERATON 1 099 uIU/mL     Narrative:      Patients undergoing fluorescein dye angiography may retain small amounts of fluorescein in the body for 48-72 hours post procedure  Samples containing fluorescein can produce falsely depressed TSH values  If the patient had this procedure,a specimen should be resubmitted post fluorescein clearance        Troponin I [[id number]]  (Abnormal) Collected: 03/23/21 1745    Lab Status: Final result Specimen: Blood from Arm, Left Updated: 03/23/21 1822     Troponin I 0 12 ng/mL     Comprehensive metabolic panel [[id number]]  (Abnormal) Collected: 03/23/21 1745    Lab Status: Final result Specimen: Blood from Arm, Left Updated: 03/23/21 1819     Sodium 138 mmol/L      Potassium 3 8 mmol/L      Chloride 101 mmol/L      CO2 28 mmol/L      ANION GAP 9 mmol/L      BUN 21 mg/dL      Creatinine 0 86 mg/dL      Glucose 126 mg/dL      Calcium 8 9 mg/dL       U/L       U/L      Alkaline Phosphatase 132 U/L      Total Protein 7 5 g/dL      Albumin 4 2 g/dL      Total Bilirubin 0 50 mg/dL      eGFR 74 ml/min/1 73sq m     Narrative:      [place] guidelines for Chronic Kidney Disease (CKD):     Stage 1 with normal or high GFR (GFR > 90 mL/min/1 73 square meters)    Stage 2 Mild CKD (GFR = 60-89 mL/min/1 73 square meters)    Stage 3A Moderate CKD (GFR = 45-59 mL/min/1 73 square meters)    Stage 3B Moderate CKD (GFR = 30-44 mL/min/1 73 square meters)    Stage 4 Severe CKD (GFR = 15-29 mL/min/1 73 square meters)    Stage 5 End Stage CKD (GFR <15 mL/min/1 73 square meters)  Note: GFR calculation is accurate only with a steady state creatinine    Protime-INR [[id number]]  (Normal) Collected: 03/23/21 1745    Lab Status: Final result Specimen: Blood from Arm, Left Updated: 03/23/21 1812     Protime 12 7 seconds      INR 0 96    APTT [[id number]]  (Normal) Collected: 03/23/21 1745    Lab Status: Final result Specimen: Blood from Arm, Left Updated: 03/23/21 1812     PTT 25 seconds     CBC and differential [[id number]]  (Abnormal) Collected: 03/23/21 1745    Lab Status: Final result Specimen: Blood from Arm, Left Updated: 03/23/21 1801     WBC 12 07 Thousand/uL      RBC 5 21 Million/uL      Hemoglobin 15 2 g/dL      Hematocrit 47 4 %      MCV 91 fL      MCH 29 2 pg      MCHC 32 1 g/dL      RDW 12 2 %      MPV 12 3 fL      Platelets 711 Thousands/uL      nRBC 0 /100 WBCs      Neutrophils Relative 67 %      Immat GRANS % 0 %      Lymphocytes Relative 24 %      Monocytes Relative 8 %      Eosinophils Relative 1 %      Basophils Relative 0 %      Neutrophils Absolute 8 08 Thousands/µL      Immature Grans Absolute 0 03 Thousand/uL      Lymphocytes Absolute 2 91 Thousands/µL      Monocytes Absolute 0 95 Thousand/µL      Eosinophils Absolute 0 07 Thousand/µL      Basophils Absolute 0 03 Thousands/µL                  No orders to display              Procedures  ECG 12 Lead Documentation Only    Date/Time: 3/23/2021 5:32 PM  Performed by: Mag Wilson MD  Authorized by: Mag Wilson MD     Indications / Diagnosis:  Palpitations  ECG reviewed by me, the ED Provider: yes    Patient location:  ED  Previous ECG:     Previous ECG:  Unavailable  Interpretation:     Interpretation: abnormal    Rate: ECG rate:  192    ECG rate assessment: tachycardic    Rhythm:     Rhythm: SVT    Ectopy:     Ectopy: none    QRS:     QRS axis:  Normal  Conduction:     Conduction: normal    ST segments:     ST segments:  Normal  T waves:     T waves: normal               ED Course             HEART Risk Score      Most Recent Value   Heart Score Risk Calculator   History  0 Filed at: 03/23/2021 2144   ECG  0 Filed at: 03/23/2021 2144   Age  1 Filed at: 03/23/2021 2144   Risk Factors  0 Filed at: 03/23/2021 2144   Troponin  2 Filed at: 03/23/2021 2144   HEART Score  3 Filed at: 03/23/2021 2144                      SBIRT 22yo+      Most Recent Value   SBIRT (23 yo +)   In order to provide better care to our patients, we are screening all of our patients for alcohol and drug use  Would it be okay to ask you these screening questions? Yes Filed at: 03/23/2021 1735   Initial Alcohol Screen: US AUDIT-C    1  How often do you have a drink containing alcohol?  0 Filed at: 03/23/2021 1735   2  How many drinks containing alcohol do you have on a typical day you are drinking? 0 Filed at: 03/23/2021 1735   3a  Male UNDER 65: How often do you have five or more drinks on one occasion? 0 Filed at: 03/23/2021 1735   3b  FEMALE Any Age, or MALE 65+: How often do you have 4 or more drinks on one occassion? 0 Filed at: 03/23/2021 1735   Audit-C Score  0 Filed at: 03/23/2021 1735   ANTIONETTE: How many times in the past year have you    Used an illegal drug or used a prescription medication for non-medical reasons? Never Filed at: 03/23/2021 1735                    MDM  Number of Diagnoses or Management Options  Elevated liver enzymes:   Elevated troponin:   SVT (supraventricular tachycardia) [facility]):   Diagnosis management comments: Pt  Given 6 mg of adenosine and converted to ST at 110  Will monitor and check screening labs  1840 - labs reviewed and troponin is slightly elevated  Second EKG at rate 112 no acute ST-T wave changes    Discussed with cardiologist Dr Filemon Edgar who advised do 3 troponin and repeat EKG and if not going up can still be discharged on Toprol 25 mg/day and they will see in the office  Pt  Advised of plan as well as her elevated LFT's - unclear etiology but advised she will need to see her PCP for further testing  2140- repeat trop doubled  Repeat EKG normal   Discussed with cardiology  Will admit, he wants echo  Discussed with hospitalist       Disposition  Final diagnoses:   SVT (supraventricular tachycardia) ([facility] )   Elevated liver enzymes   Elevated troponin     Time reflects when diagnosis was documented in both MDM as applicable and the Disposition within this note     Time User Action Codes Description Comment    3/77/0396  1:81 PM Lina Moss A Add [U66 6] SVT (supraventricular tachycardia) ([facility] )     6/49/0151  2:43 PM Juli Farley Add [R06 0] Elevated liver enzymes     6/18/0993  8:59 PM Lina Moss A Add [A08 8] Elevated troponin       ED Disposition     ED Disposition Condition Date/Time Comment    Admit Stable Tue Mar 23, 2021  9:45 PM Case was discussed with **Dr Filemon Edgar, hospitalist* and the patient's admission status was agreed to be Admission Status: observation status         Follow-up Information    None         Patient's Medications   Discharge Prescriptions    No medications on file     No discharge procedures on file      PDMP Review     None          ED Provider  Electronically Signed by           Alexsandra Mitchell MD  14/50/90 [id number]

## 2021-03-24 ENCOUNTER — APPOINTMENT (OUTPATIENT)
Dept: RADIOLOGY | Facility: HOSPITAL | Age: 61
End: 2021-03-24
Payer: COMMERCIAL

## 2021-03-24 ENCOUNTER — APPOINTMENT (OUTPATIENT)
Dept: NON INVASIVE DIAGNOSTICS | Facility: HOSPITAL | Age: 61
End: 2021-03-24
Payer: COMMERCIAL

## 2021-03-24 VITALS
HEIGHT: 64 IN | HEART RATE: 86 BPM | SYSTOLIC BLOOD PRESSURE: 129 MMHG | DIASTOLIC BLOOD PRESSURE: 84 MMHG | BODY MASS INDEX: 20.47 KG/M2 | RESPIRATION RATE: 16 BRPM | TEMPERATURE: 99.7 F | WEIGHT: 119.93 LBS | OXYGEN SATURATION: 98 %

## 2021-03-24 PROBLEM — R79.89 ELEVATED TROPONIN: Status: ACTIVE | Noted: 2021-03-24

## 2021-03-24 PROBLEM — R77.8 ELEVATED TROPONIN: Status: ACTIVE | Noted: 2021-03-24

## 2021-03-24 LAB
ALBUMIN SERPL BCP-MCNC: 3.2 G/DL (ref 3.5–5)
ALP SERPL-CCNC: 87 U/L (ref 46–116)
ALT SERPL W P-5'-P-CCNC: 173 U/L (ref 12–78)
ANION GAP SERPL CALCULATED.3IONS-SCNC: 8 MMOL/L (ref 4–13)
AST SERPL W P-5'-P-CCNC: 79 U/L (ref 5–45)
BILIRUB DIRECT SERPL-MCNC: 0.12 MG/DL (ref 0–0.2)
BILIRUB SERPL-MCNC: 0.3 MG/DL (ref 0.2–1)
BUN SERPL-MCNC: 20 MG/DL (ref 5–25)
CALCIUM SERPL-MCNC: 8.1 MG/DL (ref 8.3–10.1)
CHEST PAIN STATEMENT: NORMAL
CHLORIDE SERPL-SCNC: 108 MMOL/L (ref 100–108)
CO2 SERPL-SCNC: 25 MMOL/L (ref 21–32)
CREAT SERPL-MCNC: 0.64 MG/DL (ref 0.6–1.3)
GFR SERPL CREATININE-BSD FRML MDRD: 98 ML/MIN/1.73SQ M
GLUCOSE SERPL-MCNC: 84 MG/DL (ref 65–140)
HAV IGM SER QL: NORMAL
HBV CORE IGM SER QL: NORMAL
HBV SURFACE AG SER QL: NORMAL
HCV AB SER QL: NORMAL
MAX DIASTOLIC BP: 83 MMHG
MAX HEART RATE: 122 BPM
MAX PREDICTED HEART RATE: 161 BPM
MAX. SYSTOLIC BP: 143 MMHG
POTASSIUM SERPL-SCNC: 3.7 MMOL/L (ref 3.5–5.3)
PROT SERPL-MCNC: 5.6 G/DL (ref 6.4–8.2)
PROTOCOL NAME: NORMAL
REASON FOR TERMINATION: NORMAL
SODIUM SERPL-SCNC: 141 MMOL/L (ref 136–145)
TARGET HR FORMULA: NORMAL
TEST INDICATION: NORMAL
TIME IN EXERCISE PHASE: NORMAL
TROPONIN I SERPL-MCNC: 0.37 NG/ML
TROPONIN I SERPL-MCNC: 0.44 NG/ML

## 2021-03-24 PROCEDURE — A9502 TC99M TETROFOSMIN: HCPCS

## 2021-03-24 PROCEDURE — 76705 ECHO EXAM OF ABDOMEN: CPT

## 2021-03-24 PROCEDURE — 93016 CV STRESS TEST SUPVJ ONLY: CPT | Performed by: INTERNAL MEDICINE

## 2021-03-24 PROCEDURE — 78452 HT MUSCLE IMAGE SPECT MULT: CPT | Performed by: INTERNAL MEDICINE

## 2021-03-24 PROCEDURE — 84484 ASSAY OF TROPONIN QUANT: CPT | Performed by: PHYSICIAN ASSISTANT

## 2021-03-24 PROCEDURE — 93018 CV STRESS TEST I&R ONLY: CPT | Performed by: INTERNAL MEDICINE

## 2021-03-24 PROCEDURE — 99244 OFF/OP CNSLTJ NEW/EST MOD 40: CPT | Performed by: INTERNAL MEDICINE

## 2021-03-24 PROCEDURE — 80048 BASIC METABOLIC PNL TOTAL CA: CPT | Performed by: PHYSICIAN ASSISTANT

## 2021-03-24 PROCEDURE — 99236 HOSP IP/OBS SAME DATE HI 85: CPT | Performed by: FAMILY MEDICINE

## 2021-03-24 PROCEDURE — 80074 ACUTE HEPATITIS PANEL: CPT | Performed by: PHYSICIAN ASSISTANT

## 2021-03-24 PROCEDURE — 80076 HEPATIC FUNCTION PANEL: CPT | Performed by: PHYSICIAN ASSISTANT

## 2021-03-24 PROCEDURE — 93017 CV STRESS TEST TRACING ONLY: CPT

## 2021-03-24 PROCEDURE — 78452 HT MUSCLE IMAGE SPECT MULT: CPT

## 2021-03-24 PROCEDURE — G1004 CDSM NDSC: HCPCS

## 2021-03-24 RX ORDER — LANOLIN ALCOHOL/MO/W.PET/CERES
3 CREAM (GRAM) TOPICAL
Status: DISCONTINUED | OUTPATIENT
Start: 2021-03-24 | End: 2021-03-24 | Stop reason: HOSPADM

## 2021-03-24 RX ADMIN — ENOXAPARIN SODIUM 40 MG: 40 INJECTION SUBCUTANEOUS at 10:06

## 2021-03-24 RX ADMIN — METOPROLOL TARTRATE 25 MG: 25 TABLET, FILM COATED ORAL at 14:20

## 2021-03-24 RX ADMIN — REGADENOSON 0.4 MG: 0.08 INJECTION, SOLUTION INTRAVENOUS at 12:25

## 2021-03-24 RX ADMIN — ASPIRIN 81 MG CHEWABLE TABLET 324 MG: 81 TABLET CHEWABLE at 00:08

## 2021-03-24 NOTE — NURSING NOTE
Patient stated understanding of discharge instructions and medications  Patient was ambulatory when she left [id number] [facility] with her

## 2021-03-24 NOTE — H&P
Mark 45  H&P- Kristine Spears 1960, 62 y o  female MRN: [id number]  Unit/Bed#: [address] [place] [address] Encounter: [id number]  Primary Care Provider: Aydin Palacios MD   Date and time admitted to hospital: 3/23/2021  5:26 PM    * Paroxysmal atrial tachycardia [facility])  Assessment & Plan  Pt presents with palpitations, hx of SVT, no improvement at home with PO metoprolol   S/p adenosine in ER, now NSR   Consult cardiology  Telemetry   Check echocardiogram  Monitor elytes  TSH normal     Elevated troponin  Assessment & Plan  0 12/0 36   Repeat now   EKG negative for ischemic changes   Monitor on tele   Given asa in ER     Transaminitis  Assessment & Plan  Unknown etiology   Check hep panel   RUQ ultrasound     VTE Prophylaxis: Enoxaparin (Lovenox)  / sequential compression device and foot pump applied   Code Status: full code   POLST: POLST form is not discussed and not completed at this time  Discussion with family: pt declined     Anticipated Length of Stay:  Patient will be admitted on an Observation basis with an anticipated length of stay of  Less than 2 midnights  Justification for Hospital Stay: SVT, cards consult, echo     Total Time for Visit, including Counseling / Coordination of Care: 45 minutes  Greater than 50% of this total time spent on direct patient counseling and coordination of care  Chief Complaint:   Palpitations since noon     History of Present Illness:    Kristine Spears is a 62 y o  female w/ PMH hx for PAT who presents with episode of palpitations around noon  Patient states she is under a lot of stress lately dealing with her mom who is ill and being transitioned from the patient's home to an assisted living facility  Patient states she has felt intermittent palpitations that are short-lived over the last few days and knew this was coming  States episodes like this have happened about 3 times before  Took a dose of metoprolol that she had at home with no improvement  States she was on beta-blockers in the past but was unable to tolerate due to low blood pressures and weakness  She denies chest pain or shortness of breath  States her dad had heart disease but he was a 2 pack-a-day smoker  Denies alcohol use or regular caffeine intake  Denies noted pre of abnormal LFTs  Denies abdominal pain  States she had her 2nd dose of her COVID vaccine, Moderna, 2 weeks ago  Review of Systems:    Review of Systems   Constitutional: Negative for chills and fever  HENT: Negative for congestion  Respiratory: Negative for shortness of breath  Cardiovascular: Positive for palpitations  Negative for chest pain  Gastrointestinal: Negative for abdominal pain  Genitourinary: Negative for dysuria  Musculoskeletal: Negative for back pain  Neurological: Negative for dizziness, syncope, weakness and light-headedness  Psychiatric/Behavioral: Negative for confusion  Past Medical and Surgical History:     Past Medical History:   Diagnosis Date    Pityriasis rosea     [facility]      History reviewed  No pertinent surgical history  Meds/Allergies:    Prior to Admission medications    Medication Sig Start Date End Date Taking? Authorizing Provider   ascorbic acid (VITAMIN C) 500 MG tablet Take 500 mg by mouth daily   Yes Historical Provider, MD   cholecalciferol (VITAMIN D3) 1,000 units tablet Take 1,000 Units by mouth daily   Yes Historical Provider, MD   diphenhydrAMINE (BENADRYL) 25 mg tablet Take 25 mg by mouth daily at bedtime as needed for itching or sleep   Yes Historical Provider, MD   ibuprofen (MOTRIN) 200 mg tablet Take 200 mg by mouth daily at bedtime as needed for mild pain   Yes Historical Provider, MD     I have reviewed home medications with patient personally  Allergies: Allergies   Allergen Reactions    Metaxalone Hives     Reaction Date: [id number];     Penicillins      Reaction Date: [id number];         Social History: Marital Status: /Civil Union   Occupation:   Patient Pre-hospital Living Situation: independent   Patient Pre-hospital Level of Mobility: no limits   Patient Pre-hospital Diet Restrictions: none   Substance Use History:   Social History     Substance and Sexual Activity   Alcohol Use Not Currently     Social History     Tobacco Use   Smoking Status Never Smoker   Smokeless Tobacco Never Used     Social History     Substance and Sexual Activity   Drug Use Never       Family History:    Family History   Problem Relation Age of Onset    Breast cancer Mother     Coronary artery disease Father        Physical Exam:     Vitals:   Blood Pressure: 116/83 (03/23/21 2308)  Pulse: 84 (03/23/21 2308)  Temperature: 98 7 °F (37 1 °C) (03/23/21 2308)  Temp Source: Oral (03/23/21 2308)  Respirations: 19 (03/23/21 2308)  Height: 5' 4" (162 6 cm) (03/23/21 2302)  Weight - Scale: 54 4 kg (119 lb 14 9 oz) (03/23/21 2302)  SpO2: 98 % (03/23/21 2308)    Physical Exam  Constitutional:       Appearance: Normal appearance  She is not ill-appearing  HENT:      Head: Normocephalic and atraumatic  Mouth/Throat:      Mouth: Mucous membranes are moist    Eyes:      Extraocular Movements: Extraocular movements intact  Neck:      Musculoskeletal: Normal range of motion and neck supple  Cardiovascular:      Rate and Rhythm: Regular rhythm  Tachycardia present  Pulmonary:      Effort: Pulmonary effort is normal       Breath sounds: Normal breath sounds  Abdominal:      General: Abdomen is flat  There is no distension  Palpations: Abdomen is soft  Tenderness: There is no abdominal tenderness  Musculoskeletal: Normal range of motion  Skin:     General: Skin is warm and dry  Neurological:      General: No focal deficit present  Mental Status: She is alert     Psychiatric:         Mood and Affect: Mood normal          Behavior: Behavior normal        Additional Data:     Lab Results: I have personally reviewed pertinent reports  Results from last 7 days   Lab Units 03/23/21  1745   WBC Thousand/uL 12 07*   HEMOGLOBIN g/dL 15 2   HEMATOCRIT % 47 4*   PLATELETS Thousands/uL 288   NEUTROS PCT % 67   LYMPHS PCT % 24   MONOS PCT % 8   EOS PCT % 1     Results from last 7 days   Lab Units 03/23/21  1745   SODIUM mmol/L 138   POTASSIUM mmol/L 3 8   CHLORIDE mmol/L 101   CO2 mmol/L 28   BUN mg/dL 21   CREATININE mg/dL 0 86   ANION GAP mmol/L 9   CALCIUM mg/dL 8 9   ALBUMIN g/dL 4 2   TOTAL BILIRUBIN mg/dL 0 50   ALK PHOS U/L 132*   ALT U/L 294*   AST U/L 217*   GLUCOSE RANDOM mg/dL 126     Results from last 7 days   Lab Units 03/23/21  1745   INR  0 96                   Imaging: I have personally reviewed pertinent reports  XR chest 1 view portable    (Results Pending)   US gallbladder    (Results Pending)       EKG, Pathology, and Other Studies Reviewed on Admission:   · EKG: NSR     Allscripts / Epic Records Reviewed: Yes     ** Please Note: This note has been constructed using a voice recognition system   **

## 2021-03-24 NOTE — UTILIZATION REVIEW
Initial Clinical Review    Admission: Date/Time/Statement:   Admission Orders (From admission, onward)     Ordered        03/23/21 2150  Place in Observation  Once                   Orders Placed This Encounter   Procedures    Place in Observation     Standing Status:   Standing     Number of Occurrences:   1     Order Specific Question:   Level of Care     Answer:   Med Surg [16]     ED Arrival Information     Expected Arrival Acuity Means of Arrival Escorted By Service Admission Type    - 3/23/2021 17:19 Immediate [facility] Emergency    Arrival Complaint    Heart Palpitations        Chief Complaint   Patient presents with    Rapid Heart Rate     patient c/o heart palpitations x about 4 hours  states took Metoprolol 25 mg PO around 1300 that had been left over  Assessment/Plan:    62year old female presents to ed from home for evaluation and treatment of heart palpitations for 4 hours despite taking metoprolol 25 mg po  PMHX : svt  Clinical assessment significant for rising troponin and ekg with tachycardia  Treated in ed with iv fluids and iv adenosine  Admit to observation for paroxysmal atrial tachycardia  Plan includes:  Consult cardiology, telemetry, echo, trend troponin        Consult cardiology     SVT, recurrent for the last 30 years      Mildly elevated troponin, probably non MI elevation secondary to tachyarrhythmia      transaminitis        RECOMMENDATIONS:  Echocardiogram   Lexiscan   Low-dose beta-blocker  Extended outpatient monitor to evaluate arrhythmia burden and consideration for EP evaluation        ED Triage Vitals   03/23/21 1725 03/23/21 1725 03/23/21 1725 03/23/21 1725 03/23/21 1725   97 8 °F (36 6 °C) (!) 204 19 139/95 99 %      Tympanic Monitor         Pain Score       No Pain          03/23/21 54 4 kg (119 lb 14 9 oz)     Additional Vital Signs:      Date/Time  Temp  Pulse  Resp  BP  MAP (mmHg)  SpO2  O2 Device    03/24/21 10:14:21  99 7 °F (37 6 °C)  78 16  130/86  101  98 %  None (Room air)    03/24/21 10:12:28  --  --  16  130/86  101  --  --    03/23/21 23:08:23  98 7 °F (37 1 °C)  84  19  116/83  94  98 %  None (Room air)    03/23/21 2200  --  86  15  --  --  99 %  --    03/23/21 2015 --  86  18  121/78  94  99 %  --    03/23/21 2000  --  --  --  114/73  89  --  --    03/23/21 1945  --  92  16  118/77  92  98 %  --    03/23/21 1930  --  92  16  118/77  93  99 %  --    03/23/21 1845  --  96  13  120/75  93  100 %  None (Room air)    03/23/21 1830  --  92  12  109/71  85  99 %  None (Room air)    03/23/21 1815  --  98  12  115/75  91  100 %  None (Room air)                Pertinent Labs/Diagnostic Test Results:       ECG 12 Lead Documentation Only   Date/Time: 3/23/2021 5:32 PM       Indications / Diagnosis:  Palpitations      Patient location:  ED  Previous ECG:     Previous ECG:  Unavailable  Interpretation:     Interpretation: abnormal    Rate:     ECG rate:  192    ECG rate assessment: tachycardic    Rhythm:     Rhythm: SVT    Ectopy:     Ectopy: none    QRS:     QRS axis:  Normal  Conduction:     Conduction: normal    ST segments:     ST segments:  Normal  T waves:     T waves: normal              US gallbladder    (03/24 1018)      1   6 2 x 4 9 x 3 6 cm hyperechoic mass in the superior portion of the right hepatic lobe  This is most likely a hemangioma  I recommend further evaluation with contrast-enhanced MRI of the abdomen for better characterization  2   1 1 cm mildly hyperechoic mass in the anterior segment of the right hepatic lobe  Again, this can be better characterized with MRI  3   Several hepatic cysts  4   Otherwise unremarkable right upper quadrant abdominal sonogram             XR chest 1 view portable   (03/24 6949)      Hyperinflation               Results from last 7 days   Lab Units 03/23/21  1745   WBC Thousand/uL 12 07*   HEMOGLOBIN g/dL 15 2   HEMATOCRIT % 47 4*   PLATELETS Thousands/uL 288   NEUTROS ABS Thousands/µL 8 08* Results from last 7 days   Lab Units 03/24/21  0517 03/23/21  1745   SODIUM mmol/L 141 138   POTASSIUM mmol/L 3 7 3 8   CHLORIDE mmol/L 108 101   CO2 mmol/L 25 28   ANION GAP mmol/L 8 9   BUN mg/dL 20 21   CREATININE mg/dL 0 64 0 86   EGFR ml/min/1 73sq m 98 74   CALCIUM mg/dL 8 1* 8 9   MAGNESIUM mg/dL  --  2 3     Results from last 7 days   Lab Units 03/24/21  0517 03/23/21  1745   AST U/L 79* 217*   ALT U/L 173* 294*   ALK PHOS U/L 87 132*   TOTAL PROTEIN g/dL 5 6* 7 5   ALBUMIN g/dL 3 2* 4 2   TOTAL BILIRUBIN mg/dL 0 30 0 50   BILIRUBIN DIRECT mg/dL 0 12  --          Results from last 7 days   Lab Units 03/24/21  0517 03/23/21  1745   GLUCOSE RANDOM mg/dL 84 126       Results from last 7 days   Lab Units 03/24/21  0517 03/24/21  0020 03/23/21 2056 03/23/21  1745   TROPONIN I ng/mL 0 37* 0 44* 0 36* 0 12*         Results from last 7 days   Lab Units 03/23/21  1745   PROTIME seconds 12 7   INR  0 96   PTT seconds 25     Results from last 7 days   Lab Units 03/23/21  1745   TSH 3RD GENERATON uIU/mL 1 099           ED Treatment:   Medication Administration from 03/23/2021 1719 to 03/23/2021 2257       Date/Time Order Dose Route Action     03/23/2021 1735 sodium chloride 0 9 % bolus 500 mL 500 mL Intravenous New Bag        Past Medical History:   Diagnosis    Pityriasis rosea    SVT (supraventricular tachycardia) (HCC)     Present on Admission:   Dyslipidemia   Paroxysmal atrial tachycardia (HCC)      Admitting Diagnosis:     SVT (supraventricular tachycardia) (HCC) [I47 1]  Rapid heart rate [R00 0]  Elevated liver enzymes [R74 8]  Elevated troponin [R77 8]    Age/Sex: 62 y o  female     Admission Orders:    enoxaparin, 40 mg, Subcutaneous, Daily  melatonin, 3 mg, Oral, HS      Continuous IV Infusions:     PRN Meds:  calcium carbonate, 1,000 mg, Oral, Daily PRN  ondansetron, 4 mg, Intravenous, Q6H PRN        IP CONSULT TO CARDIOLOGY    Network Utilization Review Department  ATTENTION: Please call with any questions or concerns to [phone] and carefully listen to the prompts so that you are directed to the right person  All voicemails are confidential   Davonte Causey all requests for admission clinical reviews, approved or denied determinations and any other requests to dedicated fax number below belonging to the campus where the patient is receiving treatment   List of dedicated fax numbers for the Facilities:  [address] DENIALS (Administrative/Medical Necessity) [phone]   [address] (Maternity/NICU/Pediatrics) [phone] [address] [place] [address] (Liza Black) [address] [place] [address] Leila Atkins [address] [place] [address] [phone]

## 2021-03-24 NOTE — ASSESSMENT & PLAN NOTE
Pt presents with palpitations, hx of SVT, no improvement at home with PO metoprolol   S/p adenosine in ER, now NSR   Consult cardiology  Telemetry   Check echocardiogram  Monitor elytes  TSH normal

## 2021-03-24 NOTE — CONSULTS
Consultation - Cardiology   Ailene Goltz 62 y o  female MRN: [id number]  Unit/Bed#: [address] [place] [address] Encounter: [id number]    Assessment/Plan     Assessment:  1  SVT  2  Abnormal troponins rule out ischemia  3  Transaminitis    Plan:  Patient has been admitted to the hospitalist service  1  Will obtain 2D echocardiogram to evaluate cardiac function, structure and wall motion    2  Will obtain Lexiscan nuclear stress test to evaluate for ischemia    3  Will start patient on low-dose beta-blocker for complaints of palpitations  4  Recommend event monitor in the outpatient setting to evaluate for arrhythmia burden  Also discussed with patient may need evaluation by electrophysiology  History of Present Illness   Physician Requesting Consult: Canelo Winchester DO  Reason for Consult / Principal Problem:  SVT, abnormal troponins      HPI: Ailene Goltz is a 62y o  year old female who presented to the emergency room with complaints of palpitations that were ongoing for 4 hours  She states she did take a metoprolol that was left over around 1:00 p m  With no affect     On presentation in the emergency room, patient was noted to be in an SVT at a rate of 192 beats per minute  She received adenosine 6 mg rapid IV push which was successful in converting patient back to sinus rhythm  She has been admitted for further evaluation  Patient has a history of palpitations and was previously seen by Dr Tammie Cox in 2017  At that time she did have a non nuclear stress test/exercise which was noted to be a stable study  On admission troponins were obtained and they are 0 12, 0 36, 0 44, and 0 37  Patient did note she did have some heaviness in her chest when she was experiencing the palpitations  She denies any change in her ability to perform activity she denies any shortness of breath  She does note over the last year or so, the frequency and duration of her bouts of palpitations have been increasing      Consult to cardiology  Consult performed by: RAFA Palmer  Consult ordered by: Octavio Evans PA-C          Review of Systems   Constitutional: Negative  Negative for activity change, fatigue and fever  HENT: Negative  Negative for congestion, ear pain, mouth sores, postnasal drip, sinus pressure and tinnitus  Eyes: Negative  Negative for photophobia and visual disturbance  Respiratory: Negative  Negative for chest tightness and shortness of breath  Cardiovascular: Positive for palpitations  Gastrointestinal: Negative  Negative for abdominal distention, constipation, diarrhea, nausea and vomiting  Endocrine: Negative for polydipsia, polyphagia and polyuria  Genitourinary: Negative  Negative for difficulty urinating  Musculoskeletal: Negative  Negative for gait problem  Skin: Negative  Neurological: Negative  Negative for dizziness and light-headedness  Hematological: Negative  Psychiatric/Behavioral: Negative  Historical Information   Past Medical History:   Diagnosis Date    Pityriasis rosea     [facility]      History reviewed  No pertinent surgical history    Social History     Substance and Sexual Activity   Alcohol Use Not Currently     Social History     Substance and Sexual Activity   Drug Use Never     E-Cigarette/Vaping     E-Cigarette/Vaping Substances     Social History     Tobacco Use   Smoking Status Never Smoker   Smokeless Tobacco Never Used     Family History:   Family History   Problem Relation Age of Onset    Breast cancer Mother     Coronary artery disease Father        Meds/Allergies   all current active meds have been reviewed, current meds:   Current Facility-Administered Medications   Medication Dose Route Frequency    calcium carbonate (TUMS) chewable tablet 1,000 mg  1,000 mg Oral Daily PRN    enoxaparin (LOVENOX) subcutaneous injection 40 mg  40 mg Subcutaneous Daily    melatonin tablet 3 mg  3 mg Oral HS    ondansetron (ZOFRAN) injection 4 mg  4 mg Intravenous Q6H PRN    and PTA meds:   Prior to Admission Medications   Prescriptions Last Dose Informant Patient Reported? Taking?   ascorbic acid (VITAMIN C) 500 MG tablet 3/22/2021 at Unknown time Self Yes Yes   Sig: Take 500 mg by mouth daily   cholecalciferol (VITAMIN D3) 1,000 units tablet Past Week at Unknown time Self Yes Yes   Sig: Take 1,000 Units by mouth daily   diphenhydrAMINE (BENADRYL) 25 mg tablet 3/22/2021 at Unknown time Self Yes Yes   Sig: Take 25 mg by mouth daily at bedtime as needed for itching or sleep   ibuprofen (MOTRIN) 200 mg tablet 3/22/2021 at Unknown time Self Yes Yes   Sig: Take 200 mg by mouth daily at bedtime as needed for mild pain      Facility-Administered Medications: None     Allergies   Allergen Reactions    Metaxalone Hives     Reaction Date: [id number];     Penicillins      Reaction Date: [id number]; Objective   Vitals: Blood pressure 130/86, pulse 78, temperature 99 7 °F (37 6 °C), temperature source Oral, resp  rate 16, height 5' 4" (1 626 m), weight 54 4 kg (119 lb 14 9 oz), SpO2 98 %  Orthostatic Blood Pressures      Most Recent Value   Blood Pressure  130/86 filed at 03/24/2021 1014   Patient Position - Orthostatic VS  Lying filed at 03/24/2021 1014            Intake/Output Summary (Last 24 hours) at 3/24/2021 1250  Last data filed at 3/23/2021 1835  Gross per 24 hour   Intake 500 ml   Output --   Net 500 ml       Invasive Devices     Peripheral Intravenous Line            Peripheral IV 03/23/21 Left Antecubital less than 1 day                Physical Exam  Vitals signs and nursing note reviewed  Constitutional:       General: She is not in acute distress  Appearance: Normal appearance  She is normal weight  HENT:      Head: Normocephalic and atraumatic  Right Ear: External ear normal       Left Ear: External ear normal       Nose: Nose normal    Eyes:      General: No scleral icterus          Right eye: No discharge  Left eye: No discharge  Conjunctiva/sclera: Conjunctivae normal       Pupils: Pupils are equal, round, and reactive to light  Neck:      Musculoskeletal: Normal range of motion and neck supple  Cardiovascular:      Rate and Rhythm: Normal rate and regular rhythm  Pulses: Normal pulses  Heart sounds: Normal heart sounds  No murmur  Pulmonary:      Effort: Pulmonary effort is normal  No respiratory distress  Breath sounds: Normal breath sounds  No wheezing, rhonchi or rales  Abdominal:      General: Bowel sounds are normal  There is no distension  Palpations: Abdomen is soft  Musculoskeletal:      Right lower leg: No edema  Left lower leg: No edema  Skin:     General: Skin is warm and dry  Capillary Refill: Capillary refill takes less than 2 seconds  Neurological:      General: No focal deficit present  Mental Status: She is alert and oriented to person, place, and time  Mental status is at baseline  Psychiatric:         Mood and Affect: Mood normal          Behavior: Behavior normal          Thought Content: Thought content normal          Judgment: Judgment normal          Lab Results:   I have personally reviewed pertinent lab results      CBC with diff:   Results from last 7 days   Lab Units 03/23/21  1745   WBC Thousand/uL 12 07*   RBC Million/uL 5 21*   HEMOGLOBIN g/dL 15 2   HEMATOCRIT % 47 4*   MCV fL 91   MCH pg 29 2   MCHC g/dL 32 1   RDW % 12 2   MPV fL 12 3   PLATELETS Thousands/uL 288     CMP:   Results from last 7 days   Lab Units 03/24/21  0517   SODIUM mmol/L 141   POTASSIUM mmol/L 3 7   CHLORIDE mmol/L 108   CO2 mmol/L 25   BUN mg/dL 20   CREATININE mg/dL 0 64   CALCIUM mg/dL 8 1*   AST U/L 79*   ALT U/L 173*   ALK PHOS U/L 87   EGFR ml/min/1 73sq m 98     Troponin:   0   Lab Value Date/Time    TROPONINI 0 37 (H) 03/24/2021 0517    TROPONINI 0 44 (H) 03/24/2021 0020    TROPONINI 0 36 (H) 03/23/2021 2056    TROPONINI 0 12 (H) 03/23/2021 1745     BNP:   Results from last 7 days   Lab Units 03/24/21  0517   POTASSIUM mmol/L 3 7   CHLORIDE mmol/L 108   CO2 mmol/L 25   BUN mg/dL 20   CREATININE mg/dL 0 64   CALCIUM mg/dL 8 1*   EGFR ml/min/1 73sq m 98     Coags:   Results from last 7 days   Lab Units 03/23/21  1745   PTT seconds 25   INR  0 96     TSH:   Results from last 7 days   Lab Units 03/23/21  1745   TSH 3RD GENERATON uIU/mL 1 099     Magnesium:   Results from last 7 days   Lab Units 03/23/21  1745   MAGNESIUM mg/dL 2 3     Lipid Profile:     Imaging: I have personally reviewed pertinent reports      EKG:  Sinus rhythm  VTE Prophylaxis: Sequential compression device Artemiodiana Sanz)     Code Status: Level 1 - Full Code  Advance Directive and Living Will:      Power of :    POLST:      Geraldo Aguilera, [facility]  Cardiology

## 2021-03-24 NOTE — DISCHARGE SUMMARY
Discharge Summary - [facility]    Patient Information: Josesito Shane 62 y o  female MRN: [id number]  Unit/Bed#: [address] [place] [address] Encounter: [id number]    Discharging Physician / Practitioner: Rodrigo Lorenzana DO  PCP: Page Victor MD  Admission Date: 3/23/2021  Discharge Date: 03/24/21    Reason for Admission: Rapid Heart Rate (patient c/o heart palpitations x about 4 hours  states took Metoprolol 25 mg PO around 1300 that had been left over )      Discharge Diagnoses:     Principal Problem:    SVT (supraventricular tachycardia) (HCC)  Active Problems:    Transaminitis    Elevated troponin  Resolved Problems:    * No resolved hospital problems  *        * SVT (supraventricular tachycardia) (HCC)  Assessment & Plan  Pt presents with palpitations, hx of SVT  Patient reports being on metoprolol in the past but made her feel lightheaded and she stopped using it without discussing with a physician  S/p adenosine in ER, now NSR   Started on Lopressor  Outpatient event monitoring per Cardiology   TSH normal     Transaminitis  Assessment & Plan  LFTs trending down  Repeat lab work as outpatient  hep panel negative  RUQ ultrasound showed hyperechoic mass and right hepatic lobe for which MRI of the abdomen was recommended  This was discussed with patient and her  and MRI has been ordered on discharge and patient to call to set up appointment  Hepatic cysts were also noted    Elevated troponin  Assessment & Plan  Troponin peaked at 0 44  EKG negative for ischemic changes   Stress test was negative for ischemia      Consultations During Hospital Stay:  IP CONSULT TO CARDIOLOGY    Procedures Performed:     · Stress test    Significant Findings:     · Refer to hospital course and above listed diagnosis related plan for details    Imaging while in hospital:    Xr Chest 1 View Portable    Result Date: 3/24/2021  Narrative: CHEST INDICATION:   svt   COMPARISON:  None EXAM PERFORMED/VIEWS:  XR CHEST PORTABLE AP semierect Images:  1 FINDINGS: Cardiomediastinal silhouette appears unremarkable  Lungs are hyperinflated but clear  No pleural effusion or pneumothorax  Osseous structures appear within normal limits for patient age  Impression: Hyperinflation  Workstation performed: [id number]     Us Gallbladder    Result Date: 3/24/2021  Narrative: RIGHT UPPER QUADRANT ULTRASOUND INDICATION:     trasaminitis  COMPARISON:  No prior abdominal imaging studies  TECHNIQUE:   Real-time ultrasound of the right upper quadrant was performed with a curvilinear transducer with both volumetric sweeps and still imaging techniques  FINDINGS: PANCREAS:  Visualized portions of the pancreas are unremarkable  AORTA AND IVC:  Visualized portions are normal for patient age  LIVER: Size:  Within normal range  The liver measures 15 4 cm in the midclavicular line  Contour:  Surface contour is smooth  Parenchyma:  6 2 x 4 9 x 3 6 cm irregular, well-circumscribed area of increased echogenicity in the right lobe with distal acoustic enhancement  Several hepatic cysts, measuring up to 2 9 cm   1 1 cm mildly hyperechoic mass in the anterior segment of the right lobe  Main portal vein patent with hepatopetal flow  GALLBLADDER: No gallstones  Gallbladder wall normal in thickness, measuring  2  mm  No pericholecystic fluid  Sonographer reports no sonographic Zaidi's sign  BILE DUCTS: No intrahepatic or extrahepatic bile duct dilatation  CBD measures 6 mm  No evidence of choledocholithiasis  RIGHT KIDNEY: Right kidney measures 11 2 x 4 4 cm  No mass, calculus or hydronephrosis  ASCITES:   None  Impression: 1   6 2 x 4 9 x 3 6 cm hyperechoic mass in the superior portion of the right hepatic lobe  This is most likely a hemangioma  I recommend further evaluation with contrast-enhanced MRI of the abdomen for better characterization  2   1 1 cm mildly hyperechoic mass in the anterior segment of the right hepatic lobe    Again, this can be better characterized with MRI  3   Several hepatic cysts  4   Otherwise unremarkable right upper quadrant abdominal sonogram  Workstation performed: [id number]       Incidental Findings:   · Liver mass, hepatic cysts    Test Results Pending at Discharge (will require follow up):   · As per After Visit Summary     Outpatient Tests Requested:  · CMP, MRI abdomen, CBC    Complications:  Refer to hospital course and above listed diagnosis related plan, if any    Hospital Course: Gerald Shen is a 62 y o  female patient who originally presented to the hospital on 3/23/2021 due to palpitations  Patient reports being under lot of stress at home  Reported intermittent palpitations that were short lived over the last few days  She took a dose of metoprolol with no improvement  Patient was admitted and seen by Cardiology and cleared by Cardiology prior to discharge  Discharge plan discussed with patient and her  who was present at bedside    Please see above list of diagnoses and related plan for additional information  Condition at Discharge: stable     Discharge Day Visit / Exam:     Subjective:  Denies any chest pain, shortness of breath, palpitations but is nervous about this occurring again after discharge    Vitals: Blood Pressure: 129/84 (03/24/21 1411)  Pulse: 86 (03/24/21 1411)  Temperature: 99 7 °F (37 6 °C) (03/24/21 1014)  Temp Source: Oral (03/24/21 1014)  Respirations: 16 (03/24/21 1411)  Height: 5' 4" (162 6 cm) (03/23/21 2302)  Weight - Scale: 54 4 kg (119 lb 14 9 oz) (03/23/21 2302)  SpO2: 98 % (03/24/21 1014)  Exam:   Physical Exam  Constitutional:       General: She is not in acute distress  Appearance: She is not diaphoretic  HENT:      Head: Normocephalic and atraumatic  Eyes:      General:         Right eye: No discharge  Left eye: No discharge  Cardiovascular:      Rate and Rhythm: Normal rate and regular rhythm     Pulmonary:      Effort: Pulmonary effort is normal  No respiratory distress  Breath sounds: Normal breath sounds  No wheezing or rales  Abdominal:      General: Bowel sounds are normal  There is no distension  Palpations: Abdomen is soft  Tenderness: There is no abdominal tenderness  Musculoskeletal:      Right lower leg: No edema  Left lower leg: No edema  Neurological:      Mental Status: She is alert and oriented to person, place, and time  Discharge instructions/Information to patient and family:(Discharge Medications and Follow up):   See after visit summary for information provided to patient and family  Provisions for Follow-Up Care:  See after visit summary for information related to follow-up care and any pertinent home health orders  Disposition: Home    Planned Readmission:  No     Discharge Statement:  I spent 25 minutes discharging the patient  This time was spent on the day of discharge  I had direct contact with the patient on the day of discharge  Greater than 50% of the total time was spent examining patient, answering all patient questions, arranging and discussing plan of care with patient as well as directly providing post-discharge instructions  Additional time then spent on discharge activities  Discharge Medications:  See after visit summary for reconciled discharge medications provided to patient and family  ** Please Note: "This note has been constructed using a voice recognition system  Therefore there may be syntax, spelling, and/or grammatical errors   Please call if you have any questions  "**

## 2021-03-24 NOTE — PLAN OF CARE
Problem: PAIN - ADULT  Goal: Verbalizes/displays adequate comfort level or baseline comfort level  Description: Interventions:  - Encourage patient to monitor pain and request assistance  - Assess pain using appropriate pain scale  - Administer analgesics based on type and severity of pain and evaluate response  - Implement non-pharmacological measures as appropriate and evaluate response  - Consider cultural and social influences on pain and pain management  - Notify physician/advanced practitioner if interventions unsuccessful or patient reports new pain  Outcome: Progressing     Problem: INFECTION - ADULT  Goal: Absence or prevention of progression during hospitalization  Description: INTERVENTIONS:  - Assess and monitor for signs and symptoms of infection  - Monitor lab/diagnostic results  - Monitor all insertion sites, i e  indwelling lines, tubes, and drains  - [place] appropriate cooling/warming therapies per order  - Administer medications as ordered  - Instruct and encourage patient and family to use good hand hygiene technique  - Identify and instruct in appropriate isolation precautions for identified infection/condition  Outcome: Progressing     Problem: SAFETY ADULT  Goal: Patient will remain free of falls  Description: INTERVENTIONS:  - Assess patient frequently for physical needs  -  Identify cognitive and physical deficits and behaviors that affect risk of falls    -  [place] fall precautions as indicated by assessment   - Educate patient/family on patient safety including physical limitations  - Instruct patient to call for assistance with activity based on assessment  - Modify environment to reduce risk of injury  - Consider OT/PT consult to assist with strengthening/mobility  Outcome: Progressing  Goal: Maintain or return to baseline ADL function  Description: INTERVENTIONS:  -  Assess patient's ability to carry out ADLs; assess patient's baseline for ADL function and identify physical deficits which impact ability to perform ADLs (bathing, care of mouth/teeth, toileting, grooming, dressing, etc )  - Assess/evaluate cause of self-care deficits   - Assess range of motion  - Assess patient's mobility; develop plan if impaired  - Assess patient's need for assistive devices and provide as appropriate  - Encourage maximum independence but intervene and supervise when necessary  - Involve family in performance of ADLs  - Assess for home care needs following discharge   - Consider OT consult to assist with ADL evaluation and planning for discharge  - Provide patient education as appropriate  Outcome: Progressing  Goal: Maintain or return mobility status to optimal level  Description: INTERVENTIONS:  - Assess patient's baseline mobility status (ambulation, transfers, stairs, etc )    - Identify cognitive and physical deficits and behaviors that affect mobility  - Identify mobility aids required to assist with transfers and/or ambulation (gait belt, sit-to-stand, lift, walker, cane, etc )  - [place] fall precautions as indicated by assessment  - Record patient progress and toleration of activity level on Mobility SBAR; progress patient to next Phase/Stage  - Instruct patient to call for assistance with activity based on assessment  - Consider rehabilitation consult to assist with strengthening/weightbearing, etc   Outcome: Progressing     Problem: DISCHARGE PLANNING  Goal: Discharge to home or other facility with appropriate resources  Description: INTERVENTIONS:  - Identify barriers to discharge w/patient and caregiver  - Arrange for needed discharge resources and transportation as appropriate  - Identify discharge learning needs (meds, wound care, etc )  - Arrange for interpretive services to assist at discharge as needed  - Refer to Case Management Department for coordinating discharge planning if the patient needs post-hospital services based on physician/advanced practitioner order or complex needs related to functional status, cognitive ability, or social support system  Outcome: Progressing     Problem: Knowledge Deficit  Goal: Patient/family/caregiver demonstrates understanding of disease process, treatment plan, medications, and discharge instructions  Description: Complete learning assessment and assess knowledge base    Interventions:  - Provide teaching at level of understanding  - Provide teaching via preferred learning methods  Outcome: Progressing     Problem: CARDIOVASCULAR - ADULT  Goal: Maintains optimal cardiac output and hemodynamic stability  Description: INTERVENTIONS:  - Monitor I/O, vital signs and rhythm  - Monitor for S/S and trends of decreased cardiac output  - Administer and titrate ordered vasoactive medications to optimize hemodynamic stability  - Assess quality of pulses, skin color and temperature  - Assess for signs of decreased coronary artery perfusion  - Instruct patient to report change in severity of symptoms  Outcome: Progressing  Goal: Absence of cardiac dysrhythmias or at baseline rhythm  Description: INTERVENTIONS:  - Continuous cardiac monitoring, vital signs, obtain 12 lead EKG if ordered  - Administer antiarrhythmic and heart rate control medications as ordered  - Monitor electrolytes and administer replacement therapy as ordered  Outcome: Progressing

## 2021-03-25 ENCOUNTER — TELEPHONE (OUTPATIENT)
Dept: CARDIOLOGY CLINIC | Facility: CLINIC | Age: 61
End: 2021-03-25

## 2021-03-25 NOTE — ASSESSMENT & PLAN NOTE
Pt presents with palpitations, hx of SVT  Patient reports being on metoprolol in the past but made her feel lightheaded and she stopped using it without discussing with a physician  S/p adenosine in ER, now NSR   Started on Lopressor    Outpatient event monitoring per Cardiology   TSH normal

## 2021-03-25 NOTE — TELEPHONE ENCOUNTER
You have successfully enrolled Oscar Longoria  Patient has been enrolled in [address] [place] per Isiah Vargas / Dr Jonathan Varner

## 2021-03-25 NOTE — ASSESSMENT & PLAN NOTE
LFTs trending down  Repeat lab work as outpatient  hep panel negative  RUQ ultrasound showed hyperechoic mass and right hepatic lobe for which MRI of the abdomen was recommended    This was discussed with patient and her  and MRI has been ordered on discharge and patient to call to set up appointment  Hepatic cysts were also noted

## 2021-03-26 ENCOUNTER — TELEPHONE (OUTPATIENT)
Dept: CARDIOLOGY CLINIC | Facility: CLINIC | Age: 61
End: 2021-03-26

## 2021-03-26 NOTE — TELEPHONE ENCOUNTER
Just making sure we are setting this patient up for a zio monitor as per Dr Dooley [place] request      Thank you so much ricardo

## 2021-03-27 LAB
ATRIAL RATE: 112 BPM
ATRIAL RATE: 147 BPM
ATRIAL RATE: 81 BPM
P AXIS: 77 DEGREES
P AXIS: 78 DEGREES
PR INTERVAL: 128 MS
PR INTERVAL: 130 MS
QRS AXIS: 51 DEGREES
QRS AXIS: 61 DEGREES
QRS AXIS: 63 DEGREES
QRSD INTERVAL: 76 MS
QRSD INTERVAL: 80 MS
QRSD INTERVAL: 86 MS
QT INTERVAL: 246 MS
QT INTERVAL: 316 MS
QT INTERVAL: 378 MS
QTC INTERVAL: 431 MS
QTC INTERVAL: 439 MS
QTC INTERVAL: 439 MS
T WAVE AXIS: 68 DEGREES
T WAVE AXIS: 70 DEGREES
T WAVE AXIS: 73 DEGREES
VENTRICULAR RATE: 112 BPM
VENTRICULAR RATE: 192 BPM
VENTRICULAR RATE: 81 BPM

## 2021-03-27 PROCEDURE — 93010 ELECTROCARDIOGRAM REPORT: CPT | Performed by: INTERNAL MEDICINE

## 2021-03-28 ENCOUNTER — HOSPITAL ENCOUNTER (EMERGENCY)
Facility: HOSPITAL | Age: 61
Discharge: HOME | End: 2021-03-28
Attending: EMERGENCY MEDICINE
Payer: COMMERCIAL

## 2021-03-28 ENCOUNTER — HOSPITAL ENCOUNTER (EMERGENCY)
Facility: HOSPITAL | Age: 61
Discharge: HOME | End: 2021-03-28
Attending: GENERAL PRACTICE | Admitting: GENERAL PRACTICE
Payer: COMMERCIAL

## 2021-03-28 VITALS
BODY MASS INDEX: 20.32 KG/M2 | HEART RATE: 64 BPM | WEIGHT: 119 LBS | OXYGEN SATURATION: 99 % | HEIGHT: 64 IN | TEMPERATURE: 98.1 F | DIASTOLIC BLOOD PRESSURE: 72 MMHG | RESPIRATION RATE: 13 BRPM | SYSTOLIC BLOOD PRESSURE: 130 MMHG

## 2021-03-28 VITALS
TEMPERATURE: 98.1 F | SYSTOLIC BLOOD PRESSURE: 161 MMHG | HEART RATE: 77 BPM | DIASTOLIC BLOOD PRESSURE: 75 MMHG | RESPIRATION RATE: 17 BRPM | OXYGEN SATURATION: 98 %

## 2021-03-28 DIAGNOSIS — Z79.899 MEDICATION MANAGEMENT: Primary | ICD-10-CM

## 2021-03-28 DIAGNOSIS — R00.2 PALPITATIONS: Primary | ICD-10-CM

## 2021-03-28 LAB
ALBUMIN SERPL BCP-MCNC: 4 G/DL (ref 3.5–5)
ALP SERPL-CCNC: 91 U/L (ref 46–116)
ALT SERPL W P-5'-P-CCNC: 81 U/L (ref 12–78)
ANION GAP SERPL CALCULATED.3IONS-SCNC: 8 MMOL/L (ref 4–13)
AST SERPL W P-5'-P-CCNC: 28 U/L (ref 5–45)
BASOPHILS # BLD AUTO: 0.01 THOUSANDS/ΜL (ref 0–0.1)
BASOPHILS NFR BLD AUTO: 0 % (ref 0–1)
BILIRUB SERPL-MCNC: 0.4 MG/DL (ref 0.2–1)
BUN SERPL-MCNC: 9 MG/DL (ref 5–25)
CALCIUM SERPL-MCNC: 8.8 MG/DL (ref 8.3–10.1)
CHLORIDE SERPL-SCNC: 104 MMOL/L (ref 100–108)
CO2 SERPL-SCNC: 30 MMOL/L (ref 21–32)
CREAT SERPL-MCNC: 0.65 MG/DL (ref 0.6–1.3)
EOSINOPHIL # BLD AUTO: 0.06 THOUSAND/ΜL (ref 0–0.61)
EOSINOPHIL NFR BLD AUTO: 2 % (ref 0–6)
ERYTHROCYTE [DISTWIDTH] IN BLOOD BY AUTOMATED COUNT: 12.2 % (ref 11.6–15.1)
GFR SERPL CREATININE-BSD FRML MDRD: 97 ML/MIN/1.73SQ M
GLUCOSE SERPL-MCNC: 109 MG/DL (ref 65–140)
HCT VFR BLD AUTO: 40 % (ref 34.8–46.1)
HGB BLD-MCNC: 13 G/DL (ref 11.5–15.4)
IMM GRANULOCYTES # BLD AUTO: 0.01 THOUSAND/UL (ref 0–0.2)
IMM GRANULOCYTES NFR BLD AUTO: 0 % (ref 0–2)
LYMPHOCYTES # BLD AUTO: 1.08 THOUSANDS/ΜL (ref 0.6–4.47)
LYMPHOCYTES NFR BLD AUTO: 39 % (ref 14–44)
MCH RBC QN AUTO: 29 PG (ref 26.8–34.3)
MCHC RBC AUTO-ENTMCNC: 32.5 G/DL (ref 31.4–37.4)
MCV RBC AUTO: 89 FL (ref 82–98)
MONOCYTES # BLD AUTO: 0.22 THOUSAND/ΜL (ref 0.17–1.22)
MONOCYTES NFR BLD AUTO: 8 % (ref 4–12)
NEUTROPHILS # BLD AUTO: 1.4 THOUSANDS/ΜL (ref 1.85–7.62)
NEUTS SEG NFR BLD AUTO: 51 % (ref 43–75)
NRBC BLD AUTO-RTO: 0 /100 WBCS
PLATELET # BLD AUTO: 169 THOUSANDS/UL (ref 149–390)
PMV BLD AUTO: 12.7 FL (ref 8.9–12.7)
POTASSIUM SERPL-SCNC: 3.7 MMOL/L (ref 3.5–5.3)
PROT SERPL-MCNC: 7.1 G/DL (ref 6.4–8.2)
RBC # BLD AUTO: 4.48 MILLION/UL (ref 3.81–5.12)
SODIUM SERPL-SCNC: 142 MMOL/L (ref 136–145)
TROPONIN I SERPL-MCNC: 0.02 NG/ML
WBC # BLD AUTO: 2.78 THOUSAND/UL (ref 4.31–10.16)

## 2021-03-28 PROCEDURE — 93005 ELECTROCARDIOGRAM TRACING: CPT

## 2021-03-28 PROCEDURE — 99285 EMERGENCY DEPT VISIT HI MDM: CPT

## 2021-03-28 PROCEDURE — 80053 COMPREHEN METABOLIC PANEL: CPT | Performed by: GENERAL PRACTICE

## 2021-03-28 PROCEDURE — 84484 ASSAY OF TROPONIN QUANT: CPT | Performed by: GENERAL PRACTICE

## 2021-03-28 PROCEDURE — 36415 COLL VENOUS BLD VENIPUNCTURE: CPT | Performed by: GENERAL PRACTICE

## 2021-03-28 PROCEDURE — 99285 EMERGENCY DEPT VISIT HI MDM: CPT | Performed by: GENERAL PRACTICE

## 2021-03-28 PROCEDURE — 99283 EMERGENCY DEPT VISIT LOW MDM: CPT

## 2021-03-28 PROCEDURE — 99282 EMERGENCY DEPT VISIT SF MDM: CPT | Performed by: EMERGENCY MEDICINE

## 2021-03-28 PROCEDURE — 85025 COMPLETE CBC W/AUTO DIFF WBC: CPT | Performed by: GENERAL PRACTICE

## 2021-03-28 RX ORDER — SODIUM CHLORIDE 9 MG/ML
3 INJECTION INTRAVENOUS
Status: DISCONTINUED | OUTPATIENT
Start: 2021-03-28 | End: 2021-03-28 | Stop reason: HOSPADM

## 2021-03-28 NOTE — ED PROVIDER NOTES
History  Chief Complaint   Patient presents with    Palpitations     Patient having what feels like fluttering in her heart,was here this am, but for a different issue,has history of SVT     Patient is a 15-year-old female with a past medical history of SVT who presents to the emergency department with palpitations  She said she felt them earlier this morning just prior to arrival   The symptoms were preceded by a chest heaviness  Not associated with diaphoresis, shortness of breath, nausea, vomiting, or chest pain  Notably the patient is fit it did not report an elevated heart rate during her episode of palpitations this morning  The patient did not feel her poles during the episode  She was seen here in the emergency room earlier this morning because she had accidentally taken her Toprol XL 25 mg b i d  Instead of metoprolol tartrate 25 mg tablets and was worried about possible overdose  Her last dose of the Toprol XL 25 mg was last night at 8:00 p m  and she was advised to wait until 8:00 p m  tonight to start the metoprolol tartrate 25 mg b i d  She was also seen in this emergency room last week and found to be in SVT with a heart rate in the 180s  SVT required administration of adenosine to break it  She was admitted at that time for elevated troponins which were trended during her hospital stay and after they were noted to be trending down she was discharged home  Palpitations  Associated symptoms: no chest pain, no diaphoresis, no nausea, no shortness of breath and no vomiting        Prior to Admission Medications   Prescriptions Last Dose Informant Patient Reported?  Taking?   ascorbic acid (VITAMIN C) 500 MG tablet  Self Yes Yes   Sig: Take 500 mg by mouth daily   cholecalciferol (VITAMIN D3) 1,000 units tablet  Self Yes Yes   Sig: Take 1,000 Units by mouth daily   diphenhydrAMINE (BENADRYL) 25 mg tablet  Self Yes Yes   Sig: Take 25 mg by mouth daily at bedtime as needed for itching or sleep metoprolol tartrate (LOPRESSOR) 25 mg tablet   No Yes   Sig: Take 1 tablet (25 mg total) by mouth every 12 (twelve) hours      Facility-Administered Medications: None       Past Medical History:   Diagnosis Date    Pityriasis rosea     [facility]        History reviewed  No pertinent surgical history  Family History   Problem Relation Age of Onset    Breast cancer Mother     Coronary artery disease Father      I have reviewed and agree with the history as documented  E-Cigarette/Vaping    E-Cigarette Use Never User      E-Cigarette/Vaping Substances     Social History     Tobacco Use    Smoking status: Never Smoker    Smokeless tobacco: Never Used   Substance Use Topics    Alcohol use: Not Currently    Drug use: Never       Review of Systems   Constitutional: Negative for chills, diaphoresis and fever  HENT: Negative for congestion, rhinorrhea and sore throat  Eyes: Negative for redness and visual disturbance  Respiratory: Negative for shortness of breath and wheezing  Cardiovascular: Positive for palpitations  Negative for chest pain  Gastrointestinal: Negative for abdominal pain, constipation, diarrhea, nausea and vomiting  Endocrine: Negative for cold intolerance and heat intolerance  Genitourinary: Negative for dysuria and hematuria  Musculoskeletal: Negative for arthralgias and myalgias  Neurological: Negative for light-headedness and headaches  Hematological: Negative for adenopathy  Does not bruise/bleed easily  Psychiatric/Behavioral: Negative for dysphoric mood  The patient is not nervous/anxious  Physical Exam  Physical Exam  Vitals signs and nursing note reviewed  Constitutional:       General: She is not in acute distress  Appearance: Normal appearance  She is not ill-appearing  HENT:      Head: Normocephalic and atraumatic  Mouth/Throat:      Mouth: Mucous membranes are moist       Pharynx: Oropharynx is clear  Eyes:      General: No scleral icterus  Conjunctiva/sclera: Conjunctivae normal    Neck:      Musculoskeletal: Normal range of motion and neck supple  Cardiovascular:      Rate and Rhythm: Normal rate and regular rhythm  Pulses: Normal pulses  Heart sounds: Normal heart sounds  No murmur  No friction rub  No gallop  Pulmonary:      Effort: Pulmonary effort is normal  No respiratory distress  Breath sounds: Normal breath sounds  No wheezing, rhonchi or rales  Abdominal:      Palpations: Abdomen is soft  Tenderness: There is no abdominal tenderness  Musculoskeletal:         General: No swelling or tenderness  Skin:     General: Skin is warm and dry  Capillary Refill: Capillary refill takes less than 2 seconds  Neurological:      General: No focal deficit present  Mental Status: She is alert  Mental status is at baseline     Psychiatric:         Mood and Affect: Mood normal          Behavior: Behavior normal          Vital Signs  ED Triage Vitals [03/28/21 1034]   Temperature Pulse Respirations Blood Pressure SpO2   98 1 °F (36 7 °C) 71 18 156/88 99 %      Temp Source Heart Rate Source Patient Position - Orthostatic VS BP Location FiO2 (%)   Tympanic Monitor Lying Left arm --      Pain Score       --           Vitals:    03/28/21 1034   BP: 156/88   Pulse: 71   Patient Position - Orthostatic VS: Lying         Visual Acuity      ED Medications  Medications   sodium chloride (PF) 0 9 % injection 3 mL (has no administration in time range)       Diagnostic Studies  Results Reviewed     Procedure Component Value Units Date/Time    Troponin I [[id number]]  (Normal) Collected: 03/28/21 1058    Lab Status: Final result Specimen: Blood from Arm, Left Updated: 03/28/21 1123     Troponin I 0 02 ng/mL     Comprehensive metabolic panel [[id number]]  (Abnormal) Collected: 03/28/21 1058    Lab Status: Final result Specimen: Blood from Arm, Left Updated: 03/28/21 1119     Sodium 142 mmol/L Potassium 3 7 mmol/L      Chloride 104 mmol/L      CO2 30 mmol/L      ANION GAP 8 mmol/L      BUN 9 mg/dL      Creatinine 0 65 mg/dL      Glucose 109 mg/dL      Calcium 8 8 mg/dL      AST 28 U/L      ALT 81 U/L      Alkaline Phosphatase 91 U/L      Total Protein 7 1 g/dL      Albumin 4 0 g/dL      Total Bilirubin 0 40 mg/dL      eGFR 97 ml/min/1 73sq m     Narrative:      National Kidney Disease Foundation guidelines for Chronic Kidney Disease (CKD):     Stage 1 with normal or high GFR (GFR > 90 mL/min/1 73 square meters)    Stage 2 Mild CKD (GFR = 60-89 mL/min/1 73 square meters)    Stage 3A Moderate CKD (GFR = 45-59 mL/min/1 73 square meters)    Stage 3B Moderate CKD (GFR = 30-44 mL/min/1 73 square meters)    Stage 4 Severe CKD (GFR = 15-29 mL/min/1 73 square meters)    Stage 5 End Stage CKD (GFR <15 mL/min/1 73 square meters)  Note: GFR calculation is accurate only with a steady state creatinine    CBC and differential [[id number]]  (Abnormal) Collected: 03/28/21 1058    Lab Status: Final result Specimen: Blood from Arm, Left Updated: 03/28/21 1111     WBC 2 78 Thousand/uL      RBC 4 48 Million/uL      Hemoglobin 13 0 g/dL      Hematocrit 40 0 %      MCV 89 fL      MCH 29 0 pg      MCHC 32 5 g/dL      RDW 12 2 %      MPV 12 7 fL      Platelets 984 Thousands/uL      nRBC 0 /100 WBCs      Neutrophils Relative 51 %      Immat GRANS % 0 %      Lymphocytes Relative 39 %      Monocytes Relative 8 %      Eosinophils Relative 2 %      Basophils Relative 0 %      Neutrophils Absolute 1 40 Thousands/µL      Immature Grans Absolute 0 01 Thousand/uL      Lymphocytes Absolute 1 08 Thousands/µL      Monocytes Absolute 0 22 Thousand/µL      Eosinophils Absolute 0 06 Thousand/µL      Basophils Absolute 0 01 Thousands/µL                  No orders to display              Procedures  ECG 12 Lead Documentation Only    Date/Time: 3/28/2021 12:59 PM  Performed by: Keith Phillips MD  Authorized by: Keith Phillips MD Indications / Diagnosis:  Palpitations  ECG reviewed by me, the ED Provider: yes    Patient location:  ED  Previous ECG:     Previous ECG:  Compared to current  Interpretation:     Interpretation: normal    Rate:     ECG rate:  71    ECG rate assessment: normal    Rhythm:     Rhythm: sinus rhythm    Ectopy:     Ectopy: none    QRS:     QRS axis:  Normal  Conduction:     Conduction: normal    ST segments:     ST segments:  Normal  T waves:     T waves: normal               ED Course        findings were discussed with the patient  She has follow-up this week with her primary care doctor, and follow-up in 1 month with Cardiology  She also received in the mail her event monitor but has not started wearing it yet  I advised the patient to follow the directions to start wearing the event monitor and if she has any questions to call her doctor's office tomorrow  I also advised her that she could try to follow up with the cardiologist sooner than 1 month from now as she reports side effects from that metoprolol including feeling in a fog that are uncomfortable to her  Right now she is being discharged in stable condition  MDM    Disposition  Final diagnoses:   Palpitations     Time reflects when diagnosis was documented in both MDM as applicable and the Disposition within this note     Time User Action Codes Description Comment    3/28/2021 11:55 AM Cuca Boyd Add [R00 2] Palpitations       ED Disposition     ED Disposition Condition Date/Time Comment    Discharge Stable Sun Mar 28, 2021 11:55 AM Liza Vela discharge to home/self care              Follow-up Information     Follow up With Specialties Details Why Αμαλίας MD [facility], [address]  [address] [facility] [address]      Rachid Craven MD Cardiology   [address] [place], [place] [phone]            Patient's Medications   Discharge Prescriptions    No medications on file     No discharge procedures on file      PDMP Review     None          ED Provider  Electronically Signed by           Keyona Rabago MD  03/28/21 5916

## 2021-03-28 NOTE — ED PROVIDER NOTES
History  Chief Complaint   Patient presents with    Medical Problem     Patient states she took Metoprolol succinate 25 mg ER twice today which was supposed to be only once per day, when she was prescribed Metoprolol Tartate 25 mg BID  Is concerned  Is not symptomatic      HPI    45-year-old year female that is concerned about medication problem  Patient states she had SVT was given metoprolol 25 mg b i d  Medial release today  She accidentally took her home 25 mg extended release b i d  Today  States she was worried and came to get evaluated  She was worried that she is hypertensive  Patient has no symptoms of lightheadedness dizziness chest pain shortness of breath palpitations  Current vitals are within normal limits  62 year female who presents with medication problem  Reassurance provided  Discussed to return if any symptoms of medication complications like dizziness lightheadedness palpitations shortness of breath chest pain    Prior to Admission Medications   Prescriptions Last Dose Informant Patient Reported? Taking?   ascorbic acid (VITAMIN C) 500 MG tablet 3/27/2021 at Unknown time Self Yes Yes   Sig: Take 500 mg by mouth daily   cholecalciferol (VITAMIN D3) 1,000 units tablet 3/27/2021 at Unknown time Self Yes Yes   Sig: Take 1,000 Units by mouth daily   diphenhydrAMINE (BENADRYL) 25 mg tablet Unknown at Unknown time Self Yes No   Sig: Take 25 mg by mouth daily at bedtime as needed for itching or sleep   metoprolol tartrate (LOPRESSOR) 25 mg tablet   No No   Sig: Take 1 tablet (25 mg total) by mouth every 12 (twelve) hours      Facility-Administered Medications: None       Past Medical History:   Diagnosis Date    Pityriasis rosea     SVT (supraventricular tachycardia) ([facility] )        History reviewed  No pertinent surgical history      Family History   Problem Relation Age of Onset    Breast cancer Mother     Coronary artery disease Father      I have reviewed and agree with the history as documented  E-Cigarette/Vaping    E-Cigarette Use Never User      E-Cigarette/Vaping Substances     Social History     Tobacco Use    Smoking status: Never Smoker    Smokeless tobacco: Never Used   Substance Use Topics    Alcohol use: Not Currently    Drug use: Never       Review of Systems   Constitutional: Negative  Negative for diaphoresis and fever  HENT: Negative  Respiratory: Negative  Negative for cough, shortness of breath and wheezing  Cardiovascular: Negative  Negative for chest pain, palpitations and leg swelling  Gastrointestinal: Negative for abdominal distention, abdominal pain, nausea and vomiting  Genitourinary: Negative  Musculoskeletal: Negative  Skin: Negative  Neurological: Negative  Psychiatric/Behavioral: Negative  All other systems reviewed and are negative  Physical Exam  Physical Exam  Constitutional:       Appearance: She is well-developed  HENT:      Head: Normocephalic and atraumatic  Eyes:      Pupils: Pupils are equal, round, and reactive to light  Neck:      Musculoskeletal: Normal range of motion and neck supple  Cardiovascular:      Rate and Rhythm: Normal rate and regular rhythm  Heart sounds: Normal heart sounds  No murmur  Pulmonary:      Effort: Pulmonary effort is normal       Breath sounds: Normal breath sounds  Abdominal:      General: Bowel sounds are normal  There is no distension  Palpations: Abdomen is soft  Tenderness: There is no abdominal tenderness  There is no guarding or rebound  Musculoskeletal: Normal range of motion  Skin:     General: Skin is warm and dry  Neurological:      Mental Status: She is alert and oriented to person, place, and time           Vital Signs  ED Triage Vitals [03/28/21 0227]   Temperature Pulse Respirations Blood Pressure SpO2   98 1 °F (36 7 °C) 77 17 161/75 98 %      Temp Source Heart Rate Source Patient Position - Orthostatic VS BP Location FiO2 (%)   Oral Monitor Sitting Left arm --      Pain Score       --           Vitals:    03/28/21 0227   BP: 161/75   Pulse: 77   Patient Position - Orthostatic VS: Sitting         Visual Acuity      ED Medications  Medications - No data to display    Diagnostic Studies  Results Reviewed     None                 No orders to display              Procedures  Procedures         ED Course                             SBIRT 22yo+      Most Recent Value   SBIRT (22 yo +)   In order to provide better care to our patients, we are screening all of our patients for alcohol and drug use  Would it be okay to ask you these screening questions? No Filed at: 03/28/2021 0230                    MDM    Disposition  Final diagnoses:   Medication management     Time reflects when diagnosis was documented in both MDM as applicable and the Disposition within this note     Time User Action Codes Description Comment    3/28/2021  2:35 AM Steven Borrero [[id number]] Medication management       ED Disposition     ED Disposition Condition Date/Time Comment    Discharge Stable Sun Mar 28, 2021  2:34 AM Gokul Pond discharge to home/self care              Follow-up Information     Follow up With Specialties Details Why Contact Info Additional Information    Ling Claudio MD Internal Medicine, Family Medicine  As needed [facility] [address]       [address] [facility] Department Emergency Medicine  If symptoms worsen [address] [address]  [address] [address] Emergency Department, [facility], [address]          Discharge Medication List as of 3/28/2021  2:36 AM      CONTINUE these medications which have NOT CHANGED    Details   ascorbic acid (VITAMIN C) 500 MG tablet Take 500 mg by mouth daily, Historical Med      cholecalciferol (VITAMIN D3) 1,000 units tablet Take 1,000 Units by mouth daily, Historical Med      diphenhydrAMINE (BENADRYL) 25 mg tablet Take 25 mg by mouth daily at bedtime as needed for itching or sleep, Historical Med      metoprolol tartrate (LOPRESSOR) 25 mg tablet Take 1 tablet (25 mg total) by mouth every 12 (twelve) hours, Starting Wed 3/24/2021, Normal           No discharge procedures on file      PDMP Review     None          ED Provider  Electronically Signed by           Alexander Calderon MD  03/28/21 9753

## 2021-03-30 LAB
ATRIAL RATE: 71 BPM
P AXIS: 67 DEGREES
PR INTERVAL: 128 MS
QRS AXIS: 49 DEGREES
QRSD INTERVAL: 80 MS
QT INTERVAL: 408 MS
QTC INTERVAL: 443 MS
T WAVE AXIS: 62 DEGREES
VENTRICULAR RATE: 71 BPM

## 2021-03-30 PROCEDURE — 93010 ELECTROCARDIOGRAM REPORT: CPT | Performed by: INTERNAL MEDICINE

## 2021-03-31 ENCOUNTER — OFFICE VISIT (OUTPATIENT)
Dept: FAMILY MEDICINE CLINIC | Facility: CLINIC | Age: 61
End: 2021-03-31
Payer: COMMERCIAL

## 2021-03-31 VITALS
RESPIRATION RATE: 16 BRPM | HEIGHT: 64 IN | OXYGEN SATURATION: 98 % | TEMPERATURE: 98.5 F | HEART RATE: 72 BPM | DIASTOLIC BLOOD PRESSURE: 70 MMHG | WEIGHT: 119 LBS | BODY MASS INDEX: 20.32 KG/M2 | SYSTOLIC BLOOD PRESSURE: 114 MMHG

## 2021-03-31 DIAGNOSIS — R53.83 OTHER FATIGUE: ICD-10-CM

## 2021-03-31 DIAGNOSIS — R74.01 TRANSAMINITIS: ICD-10-CM

## 2021-03-31 DIAGNOSIS — I47.1 SVT (SUPRAVENTRICULAR TACHYCARDIA) (HCC): Primary | ICD-10-CM

## 2021-03-31 PROBLEM — R79.89 ELEVATED TROPONIN: Status: RESOLVED | Noted: 2021-03-24 | Resolved: 2021-03-31

## 2021-03-31 PROBLEM — L72.0 WEN: Status: RESOLVED | Noted: 2019-08-06 | Resolved: 2021-03-31

## 2021-03-31 PROBLEM — R77.8 ELEVATED TROPONIN: Status: RESOLVED | Noted: 2021-03-24 | Resolved: 2021-03-31

## 2021-03-31 PROCEDURE — 99214 OFFICE O/P EST MOD 30 MIN: CPT | Performed by: FAMILY MEDICINE

## 2021-03-31 PROCEDURE — 1111F DSCHRG MED/CURRENT MED MERGE: CPT | Performed by: FAMILY MEDICINE

## 2021-03-31 PROCEDURE — 3008F BODY MASS INDEX DOCD: CPT | Performed by: FAMILY MEDICINE

## 2021-03-31 PROCEDURE — 3725F SCREEN DEPRESSION PERFORMED: CPT | Performed by: FAMILY MEDICINE

## 2021-03-31 PROCEDURE — 1036F TOBACCO NON-USER: CPT | Performed by: FAMILY MEDICINE

## 2021-03-31 NOTE — PROGRESS NOTES
Assessment/Plan:    No problem-specific Assessment & Plan notes found for this encounter  Svt, stable at present  Has heart monitor on but frustrated that it may not be recording well  Keep cardiology followup  Suggest staying on the metoprolol tartrate 25mg bid since bp and hr good depite feeling tired  Drink more fluids suggested  Pt may have a stress component but declines medications    LFTs improving, not quite to normal yet, has a f/u cmp  Acute hepatitis panel neg    Fatigue  Could be beta blocker effect but doubt since seems well controlled  Stress factors   Declines medication options again due to risk of wt gain as she noted with her son on his medications, though multiple     Diagnoses and all orders for this visit:    SVT (supraventricular tachycardia) (HCC)    Transaminitis    Other fatigue        Return if symptoms worsen or fail to improve  Subjective:      Patient ID: Audrey Brooks is a 62 y o  female  Chief Complaint   Patient presents with    Follow-up     ER and Hospital Admission        HPI  Hx of SVT  Admitted then sent home on toprol 25mg  Instructions were 25mg bid  Stress test was normal  MRI liver ordered  Has bp machine at home  Sleeping well  Avoiding caffeine  Tired since on metoprol 25mg bid  Lowest hr 56  Hx of SVT in past    The following portions of the patient's history were reviewed and updated as appropriate: allergies, current medications, past family history, past medical history, past social history, past surgical history and problem list     Review of Systems   Respiratory: Negative for shortness of breath  Cardiovascular: Negative for chest pain           Current Outpatient Medications   Medication Sig Dispense Refill    ascorbic acid (VITAMIN C) 500 MG tablet Take 500 mg by mouth daily      cholecalciferol (VITAMIN D3) 1,000 units tablet Take 1,000 Units by mouth daily      diphenhydrAMINE (BENADRYL) 25 mg tablet Take 25 mg by mouth daily at bedtime as needed for itching or sleep      metoprolol tartrate (LOPRESSOR) 25 mg tablet Take 1 tablet (25 mg total) by mouth every 12 (twelve) hours 60 tablet 0     No current facility-administered medications for this visit  Objective:    /70   Pulse 72   Temp 98 5 °F (36 9 °C)   Resp 16   Ht 5' 4" (1 626 m)   Wt 54 kg (119 lb)   SpO2 98%   BMI 20 43 kg/m²        Physical Exam  Vitals signs and nursing note reviewed  Constitutional:       General: She is not in acute distress  Appearance: She is well-developed  She is not ill-appearing  HENT:      Head: Normocephalic  Eyes:      General: No scleral icterus  Conjunctiva/sclera: Conjunctivae normal    Neck:      Musculoskeletal: Neck supple  No muscular tenderness  Thyroid: No thyromegaly  Vascular: No carotid bruit  Cardiovascular:      Rate and Rhythm: Normal rate and regular rhythm  Heart sounds: No murmur  Pulmonary:      Effort: Pulmonary effort is normal  No respiratory distress  Abdominal:      Palpations: Abdomen is soft  Musculoskeletal:         General: No deformity  Lymphadenopathy:      Cervical: No cervical adenopathy  Skin:     General: Skin is warm and dry  Coloration: Skin is not jaundiced or pale  Neurological:      Mental Status: She is alert  Motor: No weakness  Gait: Gait normal    Psychiatric:         Mood and Affect: Mood is anxious  Behavior: Behavior normal          Thought Content:  Thought content normal                 Justin Mccann, DO

## 2021-04-12 DIAGNOSIS — I47.1 PAROXYSMAL ATRIAL TACHYCARDIA (HCC): ICD-10-CM

## 2021-04-27 ENCOUNTER — CONSULT (OUTPATIENT)
Dept: CARDIOLOGY CLINIC | Facility: CLINIC | Age: 61
End: 2021-04-27
Payer: COMMERCIAL

## 2021-04-27 VITALS
BODY MASS INDEX: 20.49 KG/M2 | DIASTOLIC BLOOD PRESSURE: 70 MMHG | WEIGHT: 120 LBS | SYSTOLIC BLOOD PRESSURE: 110 MMHG | RESPIRATION RATE: 18 BRPM | HEART RATE: 62 BPM | OXYGEN SATURATION: 99 % | TEMPERATURE: 98 F | HEIGHT: 64 IN

## 2021-04-27 DIAGNOSIS — R00.2 INTERMITTENT PALPITATIONS: ICD-10-CM

## 2021-04-27 DIAGNOSIS — E78.5 DYSLIPIDEMIA: ICD-10-CM

## 2021-04-27 DIAGNOSIS — Z86.79 HISTORY OF PSVT (PAROXYSMAL SUPRAVENTRICULAR TACHYCARDIA): Primary | ICD-10-CM

## 2021-04-27 DIAGNOSIS — F41.1 GENERALIZED ANXIETY DISORDER: ICD-10-CM

## 2021-04-27 PROCEDURE — 99215 OFFICE O/P EST HI 40 MIN: CPT | Performed by: INTERNAL MEDICINE

## 2021-04-27 PROCEDURE — 1036F TOBACCO NON-USER: CPT | Performed by: INTERNAL MEDICINE

## 2021-04-27 PROCEDURE — 3008F BODY MASS INDEX DOCD: CPT | Performed by: INTERNAL MEDICINE

## 2021-04-27 RX ORDER — OMEGA-3 FATTY ACIDS/FISH OIL 300-1000MG
1 CAPSULE ORAL
COMMUNITY

## 2021-04-27 NOTE — PROGRESS NOTES
Office Cardiology Progress Note  Po Toribio 62 y o  female MRN: [id number]  04/27/21  2:20 PM      ASSESSMENT:    1  Currently suppressed PSVT occurring on 03/23/2021 with associated non MI troponin increase and with subsequent normal nuclear stress test on 03/24/2021   2  Long history of intermittent palpitations, which have become more frequent over the last six months  3  Longstanding dyslipidemia with history of elevated lifetime ASCVD risk, but not recently checked  4  Longstanding chronic generalized anxiety disorder  Plan       Patient Instructions      1  We have generated an ambulatory referral to cardiac electrophysiology to see Dr Joann Barron regarding the patient's interest in catheter ablation, especially in light of the frequency of palpitation and the recent significant troponin elevation associated with her last episode  2  We have requested a fasting lipid panel to be done at [facility] in the near future  3  We will consider a coronary CT calcium score to assist in deciding on lifelong statin therapy, once we receive the lipid panel results  4  Facial ice water immersion was recommended as emergency home treatment for any recurrent episodes  5  Continue on current medication for now  6  Recommend cardiology follow-up approximately two months with EKG  HPI    This 62 y o  female   Is being seen in follow-up of a recent [address] [place] [address] hospitalization on 03/23/21 until discharge the following day for sudden onset ofrapid heart rate of approximately 5 hours duration, failing to respond to rest, leg elevation, cold water to face, and resolving promptly after intravenous adenosine given in the emergency department  While in the hospital the patient did have a progressive but brief rise in troponin, felt to be tachycardia related, in light of her subsequently normal nuclear stress study, lack of chest pain and dyspnea and lack of ST segment changes during PSVT      This patient has a longstanding history of intermittent rapid heart action with a severe episode approximately 2001 and very occasional severe episodes thereafter, the last occurring perhaps eight or nine years ago  She has been able to manage all of the recent episodes at home except for the one occurring on 03/23/2021  For many months she has had flutters lasting for few seconds at a time, which she has been attributing to stress both at home and at work, especially with her mother's chronic illnesses, recently resulting in placement in assisted living  As an incidental note, the patient had a right upper quadrant  ultrasound in the hospital showing a hyperechoic mass in the right hepatic lobe with recommendation of abdominal MRI follow-up as an outpatient  This has not yet been done  This patient was previously seen by me on 08/17/2017 with history of chronic palpitations, remote history of PSVT, dyslipidemia with elevated lifetime ASCVD risk of 39% but with normal 10 year ASCVD risk  At that time the family history also included premature CAD involving her father at age 52  Her mother is currently alive at age 80 with known dyslipidemia  The patient also has chronic anxiety and stress related to the chronic psychiatric illness of her son and more recently the illnesses of her mother  The patient expresses great concern about long-term use of metoprolol, which seems to make her feel tired  She is quite interested in the use of catheter ablation as a potential treatment option for her PSVT  The patient lives home with her  and son and works full-time for the [employer]  The patient is also being seen in follow-up of the below listed diagnoses  Encounter Diagnoses   Name Primary?     History of PSVT (paroxysmal supraventricular tachycardia) Yes    Dyslipidemia     Intermittent palpitations     Generalized anxiety disorder         Review of Systems    All other systems negative, except as noted in history of present illness    Historical Information   Past Medical History:   Diagnosis Date    Pityriasis rosea     SVT [facility]supra[facility]ricular [facility]cardia) ([facility] )      History reviewed  No pertinent surgical history  Social History     Substance and Sexual Activity   Alcohol Use Not Currently     Social History     Substance and Sexual Activity   Drug Use Never     Social History     Tobacco Use   Smoking Status Never Smoker   Smokeless Tobacco Never Used       Family History:  Family History   Problem Relation Age of Onset    Breast cancer Mother     Coronary artery disease Father          Meds/Allergies     Prior to Admission medications    Medication Sig Start Date End Date Taking? Authorizing Provider   ascorbic acid (VITAMIN C) 500 MG tablet Take 500 mg by mouth daily   Yes Historical Provider, MD   cholecalciferol (VITAMIN D3) 1,000 units tablet Take 1,000 Units by mouth daily   Yes Historical Provider, MD   diphenhydrAMINE (BENADRYL) 25 mg tablet Take 25 mg by mouth daily at bedtime as needed for itching or sleep   Yes Historical Provider, MD   Ibuprofen (Advil) 200 MG CAPS Take by mouth   Yes Historical Provider, MD   metoprolol tartrate (LOPRESSOR) 25 mg tablet Take 1 tablet (25 mg total) by mouth every 12 (twelve) hours 4/12/21  Yes Ct Newman MD       Allergies   Allergen Reactions    Metaxalone Hives     Reaction Date: [id number];     Penicillins      Reaction Date: [id number];          Vitals:    04/27/21 1256   BP: 110/70   BP Location: Right arm   Patient Position: Sitting   Cuff Size: Standard   Pulse: 62   Resp: 18   Temp: 98 °F (36 7 °C)   TempSrc: Temporal   SpO2: 99%   Weight: 54 4 kg (120 lb)   Height: 5' 4" (1 626 m)       Body mass index is 20 6 kg/m²   16 8 pound weight loss compared to 08/17/2017    Physical Exam:    General Appearance:  Alert, cooperative, no distress, appears stated age   Head:  Normocephalic, without obvious abnormality, atraumatic   Eyes:  PERRL, conjunctiva/corneas clear, EOM's intact,   both eyes   Ears:  Normal TM's and external ear canals, both ears   Nose: Nares normal, septum midline, mucosa normal, no drainage or sinus tenderness   Throat: Lips, mucosa, and tongue normal; teeth and gums normal   Neck: Supple, symmetrical, trachea midline, no adenopathy, thyroid: not enlarged, symmetric, no tenderness/mass/nodules, no carotid bruit or JVD   Back:   Symmetric, no curvature, ROM normal, no CVA tenderness   Lungs:   Clear to auscultation bilaterally, respirations unlabored   Chest Wall:  No tenderness or deformity   Heart:  Regular rate and rhythm, S1, S2 normal, no murmur, rub or gallop   Abdomen:   Soft, non-tender, bowel sounds active all four quadrants,  no masses, no organomegaly   Extremities: Extremities normal, atraumatic, no cyanosis or edema   Pulses: 2+ and symmetric   Skin: Skin showed normal color, texture, turgor and no rashes or lesions   Lymph nodes: Cervical, supraclavicular, and axillary nodes normal   Neurologic: Normal         Cardiographics    ECG  3/23/21:     PSVT at 192 bpm with no significant ST segment abnormalities and otherwise normal ECG    IMAGING:    Portable chest x-ray 03/23/2021:    Hyperinflation with no other abnormality        Lexiscan nuclear stress test 03/24/2021:    Normal study with calculated LVEF of 74%      LAB REVIEW:      Lab Results   Component Value Date    SODIUM 142 03/28/2021    K 3 7 03/28/2021     03/28/2021    CO2 30 03/28/2021    BUN 9 03/28/2021    CREATININE 0 65 03/28/2021    GLUCOSE 90 12/26/2017    CALCIUM 8 8 03/28/2021    AST 28 03/28/2021    ALT 81 (H) 03/28/2021    ALKPHOS 91 03/28/2021    PROT 6 3 12/26/2017    BILITOT 0 3 12/26/2017    EGFR 97 03/28/2021     CHOLESTEROL  07/20/2017: 266  Cholesterol 12/26/2017: 246  Lab Results   Component Value Date    HDL 56 12/26/2017    HDL 50 09/23/2017       Lab Results   Component Value Date    LDLCALC 176 (H) 12/26/2017    LDLCALC 172 (H) 09/23/2017     No components found for: [facility]  Lab Results   Component Value Date    TRIG 72 12/26/2017    TRIG 78 09/23/2017     CBC 03/28/2021:  Normal except for WBC 2 78  Serial troponin levels 3/23 - 03/24/2021:  0 12 to 0 36 to 0 44 to 0 37  TSH 03/23/2021: 1 099  Because of complexity of past medical history and recent record review, as well as multiple questions discussed with the patient regarding cause and treatment of PSVT, possible catheter ablation and prognosis, the office visit consumed 46 minutes of face-to-face contact          Patsy Villatoro MD

## 2021-04-27 NOTE — PATIENT INSTRUCTIONS
1  We have generated an ambulatory referral to cardiac electrophysiology to see Dr Naeem Ferris regarding the patient's interest in catheter ablation, especially in light of the frequency of palpitation and the recent significant troponin elevation associated with her last episode  2  We have requested a fasting lipid panel to be done at [facility] in the near future  3  We will consider a coronary CT calcium score to assist in deciding on lifelong statin therapy, once we receive the lipid panel results  4  Facial ice water immersion was recommended as emergency home treatment for any recurrent episodes  5  Continue on current medication for now  6  Recommend cardiology follow-up approximately two months with EKG  Simple: Patient demonstrates quick and easy understanding

## 2021-04-28 ENCOUNTER — TELEPHONE (OUTPATIENT)
Dept: CARDIOLOGY CLINIC | Facility: CLINIC | Age: 61
End: 2021-04-28

## 2021-04-28 ENCOUNTER — CLINICAL SUPPORT (OUTPATIENT)
Dept: CARDIOLOGY CLINIC | Facility: CLINIC | Age: 61
End: 2021-04-28
Payer: COMMERCIAL

## 2021-04-28 DIAGNOSIS — I47.1 PAROXYSMAL ATRIAL TACHYCARDIA (HCC): ICD-10-CM

## 2021-04-28 PROCEDURE — 93228 REMOTE 30 DAY ECG REV/REPORT: CPT | Performed by: INTERNAL MEDICINE

## 2021-04-28 NOTE — TELEPHONE ENCOUNTER
Received end of summary report that was ordered to be worn for 30 days  However,report wear time received was only for 3 hours 42 min? Called to inquire

## 2021-04-29 ENCOUNTER — TELEPHONE (OUTPATIENT)
Dept: CARDIOLOGY CLINIC | Facility: CLINIC | Age: 61
End: 2021-04-29

## 2021-04-29 NOTE — TELEPHONE ENCOUNTER
----- Message from Ariela Serrano MD sent at 4/28/2021  4:33 PM EDT -----  Cardiac event monitoring was performed for only 4 hours and 42 minutes    Rhythm throughout was sinus with an average heart rate of 75 ppm  There were no PVCs and rare PACs with 1 supraventricular triplet, without any SVT or atrial fibrillation  1 transmitted event of heart racing corresponded to sinus rhythm at a rate of 80 per minute with a supraventricular triplet    Impression:  Generally normal sinus rhythm with normal heart rate  Rare PACs with 1 triplet during which patient felt her heart racing  No significant abnormality detected  Will discuss further at next office visit

## 2021-05-17 DIAGNOSIS — I47.1 PAROXYSMAL ATRIAL TACHYCARDIA (HCC): ICD-10-CM

## 2021-05-23 LAB
CHOLEST SERPL-MCNC: 203 MG/DL (ref 100–199)
CHOLEST/HDLC SERPL: 6 RATIO (ref 0–4.4)
HDLC SERPL-MCNC: 34 MG/DL
LDLC SERPL CALC-MCNC: 138 MG/DL (ref 0–99)
SL AMB VLDL CHOLESTEROL CALC: 31 MG/DL (ref 5–40)
TRIGL SERPL-MCNC: 173 MG/DL (ref 0–149)

## 2021-05-25 ENCOUNTER — TELEPHONE (OUTPATIENT)
Dept: CARDIOLOGY CLINIC | Facility: CLINIC | Age: 61
End: 2021-05-25

## 2021-05-25 DIAGNOSIS — E78.5 DYSLIPIDEMIA: Primary | ICD-10-CM

## 2021-05-25 NOTE — TELEPHONE ENCOUNTER
Order for CT coronary calcium score was placed on 05/25/2021  Please provide patient with central scheduling phone number

## 2021-05-25 NOTE — TELEPHONE ENCOUNTER
Patient called today stating that she is interested in getting the CT coronary calcium score done  Aware of the $99 fee  Will you please place order?   Reminder provide central scheduling's phone number

## 2021-05-25 NOTE — TELEPHONE ENCOUNTER
I spoke with patient, made aware that order has been placed, provided central scheduling phone number to patient

## 2021-06-11 ENCOUNTER — HOSPITAL ENCOUNTER (OUTPATIENT)
Dept: CT IMAGING | Facility: HOSPITAL | Age: 61
Discharge: HOME | End: 2021-06-11
Payer: COMMERCIAL

## 2021-06-11 DIAGNOSIS — E78.5 DYSLIPIDEMIA: ICD-10-CM

## 2021-06-11 PROCEDURE — 75571 CT HRT W/O DYE W/CA TEST: CPT

## 2021-06-11 PROCEDURE — G1004 CDSM NDSC: HCPCS

## 2021-06-21 NOTE — RESULT ENCOUNTER NOTE
Coronary calcium score from 06/18/2021 was 8, which places the patient in the 73rd percentile of risk for a female of her raceand age  Based on this, I would definitely be in favor of  the patient starting on medication to lower her future risk of cardiovascular disease  I suggest atorvastatin in a medium dose of 40 milligrams daily, which I will be happy to order, if the patient indicates willingness to take it  Please let us know  If there are any questions, we will get back to you      Dr Sosa Butts

## 2021-06-22 ENCOUNTER — TELEPHONE (OUTPATIENT)
Dept: CARDIOLOGY CLINIC | Facility: CLINIC | Age: 61
End: 2021-06-22

## 2021-06-22 NOTE — TELEPHONE ENCOUNTER
Pt made aware of CT Calc  Score and recommendations  However, patient has an upcoming appointment and would like to discuss this at that time

## 2021-06-22 NOTE — TELEPHONE ENCOUNTER
----- Message from Chuck Rodriguez MD sent at 6/21/2021  4:16 PM EDT -----   Coronary calcium score from 06/18/2021 was 8, which places the patient in the 73rd percentile of risk for a female of her raceand age  Based on this, I would definitely be in favor of  the patient starting on medication to lower her future risk of cardiovascular disease  I suggest atorvastatin in a medium dose of 40 milligrams daily, which I will be happy to order, if the patient indicates willingness to take it  Please let us know  If there are any questions, we will get back to you      Dr Bronson Lake

## 2021-07-06 ENCOUNTER — OFFICE VISIT (OUTPATIENT)
Dept: CARDIOLOGY CLINIC | Facility: CLINIC | Age: 61
End: 2021-07-06
Payer: COMMERCIAL

## 2021-07-06 VITALS
RESPIRATION RATE: 18 BRPM | SYSTOLIC BLOOD PRESSURE: 104 MMHG | BODY MASS INDEX: 20.49 KG/M2 | OXYGEN SATURATION: 97 % | DIASTOLIC BLOOD PRESSURE: 68 MMHG | TEMPERATURE: 98.1 F | HEART RATE: 73 BPM | WEIGHT: 120 LBS | HEIGHT: 64 IN

## 2021-07-06 DIAGNOSIS — Z86.79 HISTORY OF PSVT (PAROXYSMAL SUPRAVENTRICULAR TACHYCARDIA): Primary | ICD-10-CM

## 2021-07-06 DIAGNOSIS — E78.2 MIXED DYSLIPIDEMIA: ICD-10-CM

## 2021-07-06 DIAGNOSIS — R93.1 ELEVATED CORONARY ARTERY CALCIUM SCORE: ICD-10-CM

## 2021-07-06 DIAGNOSIS — F41.1 GENERALIZED ANXIETY DISORDER: ICD-10-CM

## 2021-07-06 PROCEDURE — 93000 ELECTROCARDIOGRAM COMPLETE: CPT | Performed by: INTERNAL MEDICINE

## 2021-07-06 PROCEDURE — 3008F BODY MASS INDEX DOCD: CPT | Performed by: INTERNAL MEDICINE

## 2021-07-06 PROCEDURE — 99214 OFFICE O/P EST MOD 30 MIN: CPT | Performed by: INTERNAL MEDICINE

## 2021-07-06 PROCEDURE — 1036F TOBACCO NON-USER: CPT | Performed by: INTERNAL MEDICINE

## 2021-07-06 NOTE — PATIENT INSTRUCTIONS
1  Continue current medication  2  May take metoprolol with less than a full portion of fruit or snack  3  Cannot predict effect of alcohol on heart rhythm, but based on previous exposure, risk is fairly low of triggering serious rapid heart action  4  There is no urgent need for medication addition to treat cholesterol or triglycerides  5  Please get a copy of upcoming blood work to Dr Jolie Flores at [email or web address]   6  Cardiology follow-up approximately mid October, 2021

## 2021-07-06 NOTE — PROGRESS NOTES
Office Cardiology Progress Note  Kurtis Khan 62 y o  female MRN: [id number]  07/06/21  12:09 PM      ASSESSMENT:    1  Currently suppressed PSVT occurring on 03/23/2021 with associated non MI troponin increase and with subsequent normal nuclear stress test on 03/24/2021   2  Long history of intermittent palpitations, which have become much less frequent over the last 3 months  3  Longstanding dyslipidemia with history of elevated lifetime ASCVD risk, and mildly elevated coronary calcium score of 8, with normal to below average 10 year CHD and arterial age risks, based on PANG data  4  Longstanding chronic generalized anxiety disorder  Plan       Patient Instructions     1  Continue current medication  2  May take metoprolol with less than a full portion of fruit or snack  3  Cannot predict effect of alcohol on heart rhythm, but based on previous exposure, risk is fairly low of triggering serious rapid heart action  4  There is no urgent need for medication addition to treat cholesterol or triglycerides  5  Please get a copy of upcoming blood work to Dr Tevin Vogel at [email or web address]   6  Cardiology follow-up approximately mid October, 2021  HPI    This 62 y o  female  denies new cardiopulmonary and medical symptoms  She has had no palpitations or rapid heart action  The patient had a coronary artery calcium score of eight involving LAD and diagonal branches, which placed her in the 73rd percentile of age, race, and gender matched database participants in [facility] trial     However, her 10 year CHD risk and estimated arterial age, based on her calcium score, were no worse than average with her arterial age even lower than her actual age      The patient's most recent lipid panel showed a fairly dramatic increase in her triglyceride levels, a much lower HDL level and a lower cholesterol and LDL level of uncertain cause with the patient admitting to recent consumption of more carbohydrate than usual because of the pandemic  She will be getting follow-up blood work in the near future as part of her annual work related physical     The patient is currently scheduled to see Dr Charity Hernández regarding the wisdom of ablation on 08/16/2021  She is currently working for the [employer], commuting about 35 minutes several days a week and working from home the other days  The patient is being seen in follow-up of the below listed diagnoses  Encounter Diagnoses   Name Primary?  History of PSVT (paroxysmal supraventricular tachycardia) Yes    Mixed dyslipidemia     Elevated coronary artery calcium score     Generalized anxiety disorder         Review of Systems    All other systems negative, except as noted in history of present illness    Historical Information   Past Medical History:   Diagnosis Date    Pityriasis rosea     SVT (supraventricular tachycardia) ([facility] [address] )      History reviewed  No pertinent surgical history  Social History     Substance and Sexual Activity   Alcohol Use Not Currently     Social History     Substance and Sexual Activity   Drug Use Never     Social History     Tobacco Use   Smoking Status Never Smoker   Smokeless Tobacco Never Used       Family History:  Family History   Problem Relation Age of Onset    Breast cancer Mother     Coronary artery disease Father          Meds/Allergies     Prior to Admission medications    Medication Sig Start Date End Date Taking?  Authorizing Provider   ascorbic acid (VITAMIN C) 500 MG tablet Take 500 mg by mouth daily   Yes Historical Provider, MD   cholecalciferol (VITAMIN D3) 1,000 units tablet Take 1,000 Units by mouth daily   Yes Historical Provider, MD   diphenhydrAMINE (BENADRYL) 25 mg tablet Take 25 mg by mouth daily at bedtime as needed for itching or sleep   Yes Historical Provider, MD   Ibuprofen (Advil) 200 MG CAPS Take by mouth   Yes Historical Provider, MD   metoprolol tartrate (LOPRESSOR) 25 mg tablet Take 1 tablet (25 mg total) by mouth every 12 (twelve) hours 5/17/21  Yes Benedict Anderson MD       Allergies   Allergen Reactions    Metaxalone Hives     Reaction Date: [id number];     Penicillins      Reaction Date: [id number];          Vitals:    07/06/21 1046   BP: 104/68   BP Location: Left arm   Patient Position: Sitting   Cuff Size: Standard   Pulse: 73   Resp: 18   Temp: 98 1 °F (36 7 °C)   TempSrc: Temporal   SpO2: 97%   Weight: 54 4 kg (120 lb)   Height: 5' 4" (1 626 m)       Body mass index is 20 6 kg/m²  no change in weight in approximately 3+ months and 16 8 pound weight loss in nearly one year      Physical Exam:    General Appearance:  Alert, cooperative, no distress, appears stated age   Head:  Normocephalic, without obvious abnormality, atraumatic   Eyes:  PERRL, conjunctiva/corneas clear, EOM's intact,   both eyes   Ears:  Normal TM's and external ear canals, both ears   Nose: Nares normal, septum midline, mucosa normal, no drainage or sinus tenderness   Throat: Lips, mucosa, and tongue normal; teeth and gums normal   Neck: Supple, symmetrical, trachea midline, no adenopathy, thyroid: not enlarged, symmetric, no tenderness/mass/nodules, no carotid bruit or JVD   Back:   Symmetric, no curvature, ROM normal, no CVA tenderness   Lungs:   Clear to auscultation bilaterally, respirations unlabored   Chest Wall:  No tenderness or deformity   Heart:  Regular rate and rhythm, S1, S2 normal, no murmur, rub or gallop   Abdomen:   Soft, non-tender, bowel sounds active all four quadrants,  no masses, no organomegaly   Extremities: Extremities normal, atraumatic, no cyanosis or edema   Pulses: 2+ and symmetric   Skin: Skin showed normal color, texture, turgor and no rashes or lesions   Lymph nodes: Cervical, supraclavicular, and axillary nodes normal   Neurologic: Normal         Cardiographics    ECG 07/06/21:    Normal sinus rhythm at 63 bpm  Normal ECG, unchanged from 03/28/2021    IMAGING:    No Chest XR results available for this patient            LAB REVIEW:      Lab Results   Component Value Date    SODIUM 142 03/28/2021    K 3 7 03/28/2021     03/28/2021    CO2 30 03/28/2021    BUN 9 03/28/2021    CREATININE 0 65 03/28/2021    GLUCOSE 90 12/26/2017    CALCIUM 8 8 03/28/2021    AST 28 03/28/2021    ALT 81 (H) 03/28/2021    ALKPHOS 91 03/28/2021    PROT 6 3 12/26/2017    BILITOT 0 3 12/26/2017    EGFR 97 03/28/2021     Lab Results   Component Value Date    CHOLESTEROL 203 (H) 05/22/2021     Lab Results   Component Value Date    HDL 34 (L) 05/22/2021    HDL 56 12/26/2017    HDL 50 09/23/2017       Lab Results   Component Value Date    LDLCALC 138 (H) 05/22/2021    LDLCALC 176 (H) 12/26/2017    LDLCALC 172 (H) 09/23/2017     No components found for: [facility]  Lab Results   Component Value Date    TRIG 173 (H) 05/22/2021    TRIG 72 12/26/2017    TRIG 78 09/23/2017     Lab data 04/26/2021:    CBC:  WBC 2 6 with normal H/ H and platelets  CMP:  Normal except ALT of 40    PANG 10 year CHD risk with coronary artery calcification; 3 5%, equivalent to 10 year risk of CHD event if calcium score was not included of 3 6%    [place] estimated arterial age including CAC score of eight:  55 years with estimated Silva 10 year hard CHD risk of 1% using arterial age and 2% using observed age      Alicia Swartz MD

## 2021-07-14 ENCOUNTER — TELEPHONE (OUTPATIENT)
Dept: FAMILY MEDICINE CLINIC | Facility: CLINIC | Age: 61
End: 2021-07-14

## 2021-07-14 DIAGNOSIS — Z12.31 ENCOUNTER FOR SCREENING MAMMOGRAM FOR MALIGNANT NEOPLASM OF BREAST: Primary | ICD-10-CM

## 2021-07-14 NOTE — TELEPHONE ENCOUNTER
DR BREWER [facility]    Patient needs a new mammogram script  She goes to [facility] and they wont accept the one we have

## 2021-07-15 ENCOUNTER — TELEPHONE (OUTPATIENT)
Dept: CARDIOLOGY CLINIC | Facility: CLINIC | Age: 61
End: 2021-07-15

## 2021-07-15 NOTE — TELEPHONE ENCOUNTER
We can try her on carvedilol 6 25 milligrams by mouth twice a day with food and see how she does with that  I can order that for her if she is willing  Of course, she would discontinue the metoprolol when she starts that  Leave me a message regarding whether I should order that

## 2021-07-15 NOTE — TELEPHONE ENCOUNTER
Pt called states she is concerned with side affects she has been getting from metoprolol  She says it gives her really bad GI pains that keeps her up at night  She was wondering if her medication can be changed to something else  pls advise, thank you

## 2021-07-16 DIAGNOSIS — Z86.79 HISTORY OF PSVT (PAROXYSMAL SUPRAVENTRICULAR TACHYCARDIA): Primary | ICD-10-CM

## 2021-07-16 RX ORDER — CARVEDILOL 6.25 MG/1
6.25 TABLET ORAL 2 TIMES DAILY WITH MEALS
Qty: 60 TABLET | Refills: 5 | Status: SHIPPED | OUTPATIENT
Start: 2021-07-16 | End: 2021-09-13 | Stop reason: ALTCHOICE

## 2021-07-16 NOTE — PROGRESS NOTES
Patient had reflux , eructatioon, gassiness with metoprolol tartrate and agreed to trial of carvedilol 6 25 mg bid with meals as of 7/16/2021  Rx ordered

## 2021-07-16 NOTE — TELEPHONE ENCOUNTER
Received call from patient and states the carvedilol has a black box warning and patient does not feel comfortable taking it now  Patient would like instructions on how to wean off Metoprolol and/or a suggestion of another medication that does not have a black box warning  Please advise   Thank you

## 2021-07-28 ENCOUNTER — TELEPHONE (OUTPATIENT)
Dept: CARDIOLOGY CLINIC | Facility: CLINIC | Age: 61
End: 2021-07-28

## 2021-07-28 NOTE — TELEPHONE ENCOUNTER
Pt states she had an episode with the carvedilol that she had a ring pulsing sensation in her eye  She states that one of the side affects listed for this medication is vision disturbances   She wants to know if that is concerning

## 2021-07-28 NOTE — TELEPHONE ENCOUNTER
I have not had any other patient with this particular complaint with carvedilol, which we use quite often  If it is a frequent complaint let us know, and we will consider holding it to see if the symptoms disappear  Notify patient

## 2021-07-29 NOTE — TELEPHONE ENCOUNTER
Pt expressed understanding of weaning off the carvedilol  She is having a tremendous amount of gas and a lot of stomach acid that actually keeps her up at night  Pt will let us know if symptoms change 1-2 weeks after being off the medication

## 2021-07-29 NOTE — TELEPHONE ENCOUNTER
S/W pt who states that the eye issue only happened once and she would like to wean off of the medication due to the stomach issues to see if the symptoms subside  If not, then she will start it back up again  Pt sts she does not want to just stop it and would you to advise how to wean off  Please advise

## 2021-07-29 NOTE — TELEPHONE ENCOUNTER
As far as weaning the carvedilol is concerned, she should cut the tablets in half and do 1/2 tablet twice a day for one week  Then a do 1/2 tablet once a day for five days and then discontinue the medication  There was no mention of an abdominal issue in Shana's note from 2:36 p m  yesterday  Clarify with the patient what abdominal or stomach issues is she talking about  ? Discussed with patient and let me know if she has any questions

## 2021-07-31 ENCOUNTER — OFFICE VISIT (OUTPATIENT)
Dept: FAMILY MEDICINE CLINIC | Facility: CLINIC | Age: 61
End: 2021-07-31
Payer: COMMERCIAL

## 2021-07-31 VITALS
SYSTOLIC BLOOD PRESSURE: 100 MMHG | HEART RATE: 66 BPM | DIASTOLIC BLOOD PRESSURE: 66 MMHG | OXYGEN SATURATION: 99 % | TEMPERATURE: 97.2 F | WEIGHT: 121 LBS | BODY MASS INDEX: 20.66 KG/M2 | HEIGHT: 64 IN | RESPIRATION RATE: 18 BRPM

## 2021-07-31 DIAGNOSIS — J02.9 ACUTE PHARYNGITIS, UNSPECIFIED ETIOLOGY: Primary | ICD-10-CM

## 2021-07-31 LAB — S PYO AG THROAT QL: NEGATIVE

## 2021-07-31 PROCEDURE — 1036F TOBACCO NON-USER: CPT | Performed by: FAMILY MEDICINE

## 2021-07-31 PROCEDURE — 99213 OFFICE O/P EST LOW 20 MIN: CPT | Performed by: FAMILY MEDICINE

## 2021-07-31 PROCEDURE — 3725F SCREEN DEPRESSION PERFORMED: CPT | Performed by: FAMILY MEDICINE

## 2021-07-31 PROCEDURE — 87880 STREP A ASSAY W/OPTIC: CPT | Performed by: FAMILY MEDICINE

## 2021-07-31 PROCEDURE — 3008F BODY MASS INDEX DOCD: CPT | Performed by: FAMILY MEDICINE

## 2021-07-31 RX ORDER — CLINDAMYCIN HYDROCHLORIDE 300 MG/1
300 CAPSULE ORAL 3 TIMES DAILY
Qty: 21 CAPSULE | Refills: 0 | Status: SHIPPED | OUTPATIENT
Start: 2021-07-31 | End: 2021-08-07

## 2021-07-31 NOTE — LETTER
July 31, 2021     Patient: Paulette Dyer   YOB: 1960   Date of Visit: 7/31/2021       To Whom it May Concern:    Paulette Dyer is under my professional care  She was seen in my office on 7/31/2021  She should work from home 8/2/21-8/6/21  If you have any questions or concerns, please don't hesitate to call           Sincerely,          Rachel Villalta DO        CC: No Recipients

## 2021-07-31 NOTE — PROGRESS NOTES
Assessment/Plan:    1  Acute pharyngitis, unspecified etiology  -     POCT rapid strepA  -     clindamycin (CLEOCIN) 300 MG capsule; Take 1 capsule (300 mg total) by mouth 3 (three) times a day for 7 days           There are no Patient Instructions on file for this visit  Return if symptoms worsen or fail to improve  Subjective:      Patient ID: Jimmy Bee is a 62 y o  female  Chief Complaint   Patient presents with    Sore Throat     symptoms started 9 days ago  [id number]    Nasal Congestion    Fatigue       She has had a sore throat for the past 9 days  She had a COVID test last week and it was negative  She has not had a fever  She has a mild post nasal drip  The following portions of the patient's history were reviewed and updated as appropriate:  past social history    Review of Systems   Constitutional: Negative for fever  HENT: Positive for sore throat  Respiratory: Negative for cough  Current Outpatient Medications   Medication Sig Dispense Refill    ascorbic acid (VITAMIN C) 500 MG tablet Take 500 mg by mouth daily      carvedilol (COREG) 6 25 mg tablet Take 1 tablet (6 25 mg total) by mouth 2 (two) times a day with meals (Patient taking differently: Take 6 25 mg by mouth daily ) 60 tablet 5    cholecalciferol (VITAMIN D3) 1,000 units tablet Take 1,000 Units by mouth daily      diphenhydrAMINE (BENADRYL) 25 mg tablet Take 25 mg by mouth daily at bedtime as needed for itching or sleep      Ibuprofen (Advil) 200 MG CAPS Take by mouth      clindamycin (CLEOCIN) 300 MG capsule Take 1 capsule (300 mg total) by mouth 3 (three) times a day for 7 days 21 capsule 0     No current facility-administered medications for this visit  Objective:    /66   Pulse 66   Temp (!) 97 2 °F (36 2 °C)   Resp 18   Ht 5' 4" (1 626 m)   Wt 54 9 kg (121 lb)   SpO2 99%   BMI 20 77 kg/m²      Physical Exam  Vitals and nursing note reviewed     Constitutional:       Appearance: She is well-developed  HENT:      Head: Normocephalic and atraumatic  Right Ear: Tympanic membrane and external ear normal       Left Ear: Tympanic membrane and external ear normal       Mouth/Throat:      Pharynx: Posterior oropharyngeal erythema present  Cardiovascular:      Rate and Rhythm: Normal rate and regular rhythm  Heart sounds: Normal heart sounds  No murmur heard  No friction rub  Pulmonary:      Effort: Pulmonary effort is normal  No respiratory distress  Breath sounds: Normal breath sounds  No wheezing or rales  Musculoskeletal:      Right lower leg: No edema  Left lower leg: No edema               Vipul Thornton, DO

## 2021-08-16 ENCOUNTER — CONSULT (OUTPATIENT)
Dept: CARDIOLOGY CLINIC | Facility: CLINIC | Age: 61
End: 2021-08-16
Payer: COMMERCIAL

## 2021-08-16 VITALS
WEIGHT: 119 LBS | HEART RATE: 67 BPM | BODY MASS INDEX: 20.32 KG/M2 | SYSTOLIC BLOOD PRESSURE: 128 MMHG | DIASTOLIC BLOOD PRESSURE: 84 MMHG | HEIGHT: 64 IN

## 2021-08-16 DIAGNOSIS — R00.2 INTERMITTENT PALPITATIONS: ICD-10-CM

## 2021-08-16 DIAGNOSIS — E78.5 DYSLIPIDEMIA: ICD-10-CM

## 2021-08-16 DIAGNOSIS — Z86.79 HISTORY OF PSVT (PAROXYSMAL SUPRAVENTRICULAR TACHYCARDIA): Primary | ICD-10-CM

## 2021-08-16 DIAGNOSIS — I47.1 SVT (SUPRAVENTRICULAR TACHYCARDIA) (HCC): ICD-10-CM

## 2021-08-16 PROCEDURE — 93000 ELECTROCARDIOGRAM COMPLETE: CPT | Performed by: INTERNAL MEDICINE

## 2021-08-16 PROCEDURE — 99214 OFFICE O/P EST MOD 30 MIN: CPT | Performed by: INTERNAL MEDICINE

## 2021-08-16 PROCEDURE — 1036F TOBACCO NON-USER: CPT | Performed by: INTERNAL MEDICINE

## 2021-08-16 PROCEDURE — 3008F BODY MASS INDEX DOCD: CPT | Performed by: INTERNAL MEDICINE

## 2021-08-16 NOTE — PROGRESS NOTES
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 based on the EKG and response to adenosine  I explained to this patient in detail risks benefits and alternatives of catheter ablation I estimate approximately 95% success rate with one procedure and up to 1% risk of complication including but not limited to bleeding bruising damage to blood vessels and that serious complications such as needing a pacemaker heart perforation stroke or heart attack are quite rare  I showed her images of the procedure and explained the pathophysiology of her symptoms  I offered her a 2nd opinion with one of our other electrophysiologist as she clearly is concerned about the potential risks related to the procedure  We briefly discussed her hyperlipidemia she does not want to try statin which is understandable and I recommended she discuss zetia with her primary cardiologist     I explained to the patient that SVT is not a life-threatening condition and that mild troponin elevation was likely secondary to elevated heart rate for hours at a time  She has done about the possibility of traveling and I explained to her that certainly it is quite reasonable to undergo the ablation and answered all of her questions    We will follow-up on an as-needed basis    CC/HPI:    she presents in consultation regarding paroxysmal SVT  She has had episodes dating back 30 years  She recently had an episode that lasted 6 hours with small troponin elevation  She was in the ER otherwise approximately 15-20 years ago with an episode  She has episodes every couple years that last up to 40 minutes    She does not get chest pain shortness of breath lightheadedness or dizziness but rather palpitations when she goes into SVT she also has very brief heart flutters the last few seconds  She was started on metoprolol and also tried carvedilol and did not tolerate either of these structures due to side effects        ROS: ROS   positive for palpitations negative for chest pain shortness of breath edema headache dizziness lightheadedness fatigue or syncope  Objective:     Vitals: Blood pressure 128/84, pulse 67, height 5' 4" (1 626 m), weight 54 kg (119 lb)  , Body mass index is 20 43 kg/m²  ,        Physical Exam:    GEN: Chen Garnica appears well, alert and oriented x 3, pleasant and cooperative   HEENT: pupils equal, round, and reactive to light; extraocular muscles intact  NECK: supple, no carotid bruits   HEART: regular rhythm, normal S1 and S2, no murmurs, clicks, gallops or rubs   LUNGS: clear to auscultation bilaterally; no wheezes, rales, or rhonchi   ABDOMEN: normal bowel sounds, soft, no tenderness, no distention  EXTREMITIES: peripheral pulses normal; no clubbing, cyanosis, or edema  NEURO: no focal findings   SKIN: normal without suspicious lesions on exposed skin    Medications:      Current Outpatient Medications:     ascorbic acid (VITAMIN C) 500 MG tablet, Take 500 mg by mouth daily, Disp: , Rfl:     cholecalciferol (VITAMIN D3) 1,000 units tablet, Take 1,000 Units by mouth daily, Disp: , Rfl:     diphenhydrAMINE (BENADRYL) 25 mg tablet, Take 25 mg by mouth daily at bedtime as needed for itching or sleep, Disp: , Rfl:     Ibuprofen (Advil) 200 MG CAPS, Take by mouth, Disp: , Rfl:     carvedilol (COREG) 6 25 mg tablet, Take 1 tablet (6 25 mg total) by mouth 2 (two) times a day with meals (Patient not taking: Reported on 8/16/2021), Disp: 60 tablet, Rfl: 5     Family History   Problem Relation Age of Onset    Breast cancer Mother     Coronary artery disease Father      Social History     Socioeconomic History    Marital status: /Civil [employer]     Spouse name: Not on file    Number of children: Not on file    Years of education: Not on file    Highest education level: Not on file   Occupational History    Not on file   Tobacco Use    Smoking status: Never Smoker    Smokeless tobacco: Never Used   Vaping Use    Vaping Use: Never used   Substance and Sexual Activity    Alcohol use: Yes     Comment: rare    Drug use: Never    Sexual activity: Not on file   Other Topics Concern    Not on file   Social History Narrative    Not on file     Social Determinants of Health     Financial Resource Strain:     Difficulty of Paying Living Expenses:    Food Insecurity:     Worried About Running Out of Food in the Last Year:     [address] in the Last Year:    Transportation Needs:     Lack of Transportation (Medical):  Lack of Transportation (Non-Medical):    Physical Activity:     Days of Exercise per Week:     Minutes of Exercise per Session:    Stress:     Feeling of Stress :    Social Connections:     Frequency of Communication with Friends and Family:     Frequency of Social Gatherings with Friends and Family:     Attends Yazidism Services:     Active Member of Clubs or Organizations:     Attends Club or Organization Meetings:     Marital Status:    Intimate Partner Violence:     Fear of Current or Ex-Partner:     Emotionally Abused:     Physically Abused:     Sexually Abused:      Social History     Tobacco Use   Smoking Status Never Smoker   Smokeless Tobacco Never Used     Social History     Substance and Sexual Activity   Alcohol Use Yes    Comment: rare       Labs & Results:  Below is the patient's most recent value for Albumin, ALT, AST, BUN, Calcium, Chloride, Cholesterol, CO2, Creatinine, GFR, Glucose, HDL, Hematocrit, Hemoglobin, Hemoglobin A1C, LDL, Magnesium, Phosphorus, Platelets, Potassium, PSA, Sodium, Triglycerides, and WBC     Lab Results   Component Value Date    ALT 81 (H) 03/28/2021    AST 28 03/28/2021    BUN 9 03/28/2021    CALCIUM 8 8 03/28/2021     03/28/2021    CHOL 246 (H) 12/26/2017    CO2 30 03/28/2021    CREATININE 0 65 03/28/2021    HDL 34 (L) 05/22/2021    HCT 40 0 03/28/2021    HGB 13 0 03/28/2021    MG 2 3 03/23/2021     03/28/2021    K 3 7 03/28/2021     12/26/2017    TRIG 173 (H) 05/22/2021    WBC 2 78 (L) 03/28/2021     Note: for a comprehensive list of the patient's lab results, access the Results Review activity  Cardiac testing:   No results found for this or any previous visit  No results found for this or any previous visit  No results found for this or any previous visit  No results found for this or any previous visit

## 2021-09-07 ENCOUNTER — OFFICE VISIT (OUTPATIENT)
Dept: FAMILY MEDICINE CLINIC | Facility: CLINIC | Age: 61
End: 2021-09-07
Payer: COMMERCIAL

## 2021-09-07 VITALS
TEMPERATURE: 98.3 F | HEIGHT: 64 IN | SYSTOLIC BLOOD PRESSURE: 118 MMHG | BODY MASS INDEX: 20.66 KG/M2 | DIASTOLIC BLOOD PRESSURE: 88 MMHG | RESPIRATION RATE: 18 BRPM | WEIGHT: 121 LBS | HEART RATE: 72 BPM | OXYGEN SATURATION: 98 %

## 2021-09-07 DIAGNOSIS — R14.0 ABDOMINAL BLOATING: Primary | ICD-10-CM

## 2021-09-07 PROCEDURE — 99213 OFFICE O/P EST LOW 20 MIN: CPT | Performed by: FAMILY MEDICINE

## 2021-09-07 NOTE — PROGRESS NOTES
Assessment/Plan:       Diagnoses and all orders for this visit:    Abdominal bloating  Flatus  -     Ambulatory referral to Gastroenterology; Future  - Unclear etiology  Counseled on keeping a food log to see what can be triggering symptoms  Would benefit from colonoscopy  Does have MRI abdomen coming up for follow up of lesion on liver which is thought to be a hemangioma  - She does have occasional acid reflux type symptoms, can try OTC PPI or H2 blocker  Subjective:      Patient ID: Kurtis Khan is a 62 y o  female  HPI  She reports having abdominal bloating and gas for the past 5 months  She also noted softer stools  She has been drinking different coffee with glycerin for the past few weeks which she thinks may be causing her loose stools, but the bloating started before that  No other recent dietary changes  Symptoms occur every day  Denies abdominal pain, nausea, vomiting, blood in stool, fevers, chills  The following portions of the patient's history were reviewed and updated as appropriate: allergies, current medications, past family history, past medical history, past social history, past surgical history and problem list     Review of Systems   Constitutional: Negative  HENT: Negative  Eyes: Negative  Respiratory: Negative  Cardiovascular: Negative  Gastrointestinal: Positive for abdominal distention  Flatus   Endocrine: Negative  Genitourinary: Negative  Musculoskeletal: Negative  Skin: Negative  Allergic/Immunologic: Negative  Neurological: Negative  Hematological: Negative  Psychiatric/Behavioral: Negative  Objective:      /88   Pulse 72   Temp 98 3 °F (36 8 °C)   Resp 18   Ht 5' 4" (1 626 m)   Wt 54 9 kg (121 lb)   SpO2 98%   BMI 20 77 kg/m²          Physical Exam  Constitutional:       General: She is not in acute distress  Appearance: She is well-developed  She is not diaphoretic     HENT:      Head: Normocephalic and atraumatic  Cardiovascular:      Rate and Rhythm: Normal rate and regular rhythm  Heart sounds: Normal heart sounds  No murmur heard  No friction rub  No gallop  Pulmonary:      Effort: Pulmonary effort is normal  No respiratory distress  Breath sounds: Normal breath sounds  No wheezing or rales  Chest:      Chest wall: No tenderness  Abdominal:      General: Abdomen is flat  Bowel sounds are normal  There is no distension  Palpations: Abdomen is soft  There is no mass  Tenderness: There is no abdominal tenderness  There is no right CVA tenderness, left CVA tenderness, guarding or rebound  Hernia: No hernia is present  Musculoskeletal:         General: No deformity  Normal range of motion  Cervical back: Normal range of motion and neck supple  Skin:     General: Skin is warm and dry  Neurological:      Mental Status: She is alert and oriented to person, place, and time  Psychiatric:         Behavior: Behavior normal          Thought Content:  Thought content normal          Judgment: Judgment normal

## 2021-09-12 ENCOUNTER — OFFICE VISIT (OUTPATIENT)
Dept: URGENT CARE | Facility: CLINIC | Age: 61
End: 2021-09-12
Payer: COMMERCIAL

## 2021-09-12 VITALS
TEMPERATURE: 98.7 F | OXYGEN SATURATION: 99 % | HEART RATE: 76 BPM | SYSTOLIC BLOOD PRESSURE: 136 MMHG | DIASTOLIC BLOOD PRESSURE: 72 MMHG | WEIGHT: 121 LBS | RESPIRATION RATE: 16 BRPM | HEIGHT: 64 IN | BODY MASS INDEX: 20.66 KG/M2

## 2021-09-12 DIAGNOSIS — R42 DIZZINESS: Primary | ICD-10-CM

## 2021-09-12 PROCEDURE — 99213 OFFICE O/P EST LOW 20 MIN: CPT | Performed by: PHYSICIAN ASSISTANT

## 2021-09-13 ENCOUNTER — OFFICE VISIT (OUTPATIENT)
Dept: FAMILY MEDICINE CLINIC | Facility: CLINIC | Age: 61
End: 2021-09-13
Payer: COMMERCIAL

## 2021-09-13 VITALS
RESPIRATION RATE: 14 BRPM | BODY MASS INDEX: 20.49 KG/M2 | SYSTOLIC BLOOD PRESSURE: 94 MMHG | OXYGEN SATURATION: 98 % | TEMPERATURE: 97.1 F | HEART RATE: 82 BPM | DIASTOLIC BLOOD PRESSURE: 70 MMHG | WEIGHT: 120 LBS | HEIGHT: 64 IN

## 2021-09-13 DIAGNOSIS — R42 VERTIGO: Primary | ICD-10-CM

## 2021-09-13 PROCEDURE — 3008F BODY MASS INDEX DOCD: CPT | Performed by: FAMILY MEDICINE

## 2021-09-13 PROCEDURE — 99213 OFFICE O/P EST LOW 20 MIN: CPT | Performed by: FAMILY MEDICINE

## 2021-09-13 PROCEDURE — 1036F TOBACCO NON-USER: CPT | Performed by: FAMILY MEDICINE

## 2021-09-13 NOTE — PROGRESS NOTES
Assessment/Plan:    1  Vertigo  Comments:  new, home exercises reviewed  if she doesn't improve will [facility]       Patient Instructions   Epley maneuvers reviewed       Return if symptoms worsen or fail to improve  Subjective:      Patient ID: Mireille Nguyen is a 62 y o  female  Chief Complaint   Patient presents with    Dizziness       She has been having a dizzy feeling when she is laying down  It lasts a few seconds and then it rights itself  Her symptoms started on the 10th when she laid down on her bed  She went to an Urgent Care yesterday  The following portions of the patient's history were reviewed and updated as appropriate:  past social history    Review of Systems      Current Outpatient Medications   Medication Sig Dispense Refill    diphenhydrAMINE (BENADRYL) 25 mg tablet Take 25 mg by mouth daily at bedtime as needed for itching or sleep      Ibuprofen (Advil) 200 MG CAPS Take by mouth      ascorbic acid (VITAMIN C) 500 MG tablet Take 500 mg by mouth daily (Patient not taking: Reported on 9/13/2021)      cholecalciferol (VITAMIN D3) 1,000 units tablet Take 1,000 Units by mouth daily (Patient not taking: Reported on 9/13/2021)       No current facility-administered medications for this visit  Objective:    BP 94/70   Pulse 82   Temp (!) 97 1 °F (36 2 °C)   Resp 14   Ht 5' 4" (1 626 m)   Wt 54 4 kg (120 lb)   SpO2 98%   BMI 20 60 kg/m²      Physical Exam  Vitals and nursing note reviewed  Constitutional:       Appearance: She is well-developed  HENT:      Head: Normocephalic and atraumatic  Right Ear: Tympanic membrane and external ear normal       Left Ear: Tympanic membrane and external ear normal    Eyes:      Extraocular Movements: Extraocular movements intact  Pupils: Pupils are equal, round, and reactive to light  Cardiovascular:      Rate and Rhythm: Normal rate and regular rhythm  Heart sounds: Normal heart sounds   No murmur heard  No friction rub  Pulmonary:      Effort: Pulmonary effort is normal  No respiratory distress  Breath sounds: Normal breath sounds  No wheezing or rales  Musculoskeletal:      Right lower leg: No edema  Left lower leg: No edema     Neurological:      Comments: Dizzy with laying flat and turning her head to the right, positive for vertical nystagmus during maneuver              Sada Anaya, DO

## 2021-09-27 ENCOUNTER — TELEPHONE (OUTPATIENT)
Dept: FAMILY MEDICINE CLINIC | Facility: CLINIC | Age: 61
End: 2021-09-27

## 2021-09-27 DIAGNOSIS — F41.9 ANXIETY: Primary | ICD-10-CM

## 2021-09-27 RX ORDER — ALPRAZOLAM 0.25 MG/1
0.25 TABLET ORAL
Qty: 4 TABLET | Refills: 0 | Status: SHIPPED | OUTPATIENT
Start: 2021-09-27 | End: 2021-11-01 | Stop reason: ALTCHOICE

## 2021-09-27 NOTE — TELEPHONE ENCOUNTER
DR BREWER [facility]   Patient has a closed MRI and is asking for a xanax for the test    The test is sharyn  Please advise  Lorrie Clark

## 2021-09-28 ENCOUNTER — HOSPITAL ENCOUNTER (OUTPATIENT)
Dept: RADIOLOGY | Facility: HOSPITAL | Age: 61
Discharge: HOME | End: 2021-09-28
Attending: FAMILY MEDICINE
Payer: COMMERCIAL

## 2021-09-28 DIAGNOSIS — R74.01 TRANSAMINITIS: ICD-10-CM

## 2021-09-28 DIAGNOSIS — K76.9 LIVER LESION: ICD-10-CM

## 2021-09-28 PROCEDURE — G1004 CDSM NDSC: HCPCS

## 2021-09-28 PROCEDURE — A9585 GADOBUTROL INJECTION: HCPCS | Performed by: FAMILY MEDICINE

## 2021-09-28 PROCEDURE — 74183 MRI ABD W/O CNTR FLWD CNTR: CPT

## 2021-09-28 RX ADMIN — GADOBUTROL 6 ML: 604.72 INJECTION INTRAVENOUS at 16:49

## 2021-10-21 ENCOUNTER — CONSULT (OUTPATIENT)
Dept: GASTROENTEROLOGY | Facility: CLINIC | Age: 61
End: 2021-10-21
Payer: COMMERCIAL

## 2021-10-21 VITALS
SYSTOLIC BLOOD PRESSURE: 144 MMHG | WEIGHT: 122 LBS | DIASTOLIC BLOOD PRESSURE: 84 MMHG | HEIGHT: 64 IN | BODY MASS INDEX: 20.83 KG/M2 | TEMPERATURE: 97.6 F | HEART RATE: 83 BPM

## 2021-10-21 DIAGNOSIS — R14.0 ABDOMINAL BLOATING: ICD-10-CM

## 2021-10-21 DIAGNOSIS — Z12.11 SCREEN FOR COLON CANCER: Primary | ICD-10-CM

## 2021-10-21 DIAGNOSIS — R74.8 ELEVATED LIVER ENZYMES: ICD-10-CM

## 2021-10-21 PROCEDURE — 99204 OFFICE O/P NEW MOD 45 MIN: CPT | Performed by: PHYSICIAN ASSISTANT

## 2021-10-21 PROCEDURE — 1036F TOBACCO NON-USER: CPT | Performed by: PHYSICIAN ASSISTANT

## 2021-10-21 PROCEDURE — 3008F BODY MASS INDEX DOCD: CPT | Performed by: PHYSICIAN ASSISTANT

## 2021-10-29 LAB
ALBUMIN SERPL-MCNC: 4.6 G/DL (ref 3.8–4.9)
ALBUMIN/GLOB SERPL: 2.7 {RATIO} (ref 1.2–2.2)
ALP SERPL-CCNC: 85 IU/L (ref 44–121)
ALT SERPL-CCNC: 22 IU/L (ref 0–32)
AST SERPL-CCNC: 23 IU/L (ref 0–40)
BASOPHILS # BLD AUTO: 0 X10E3/UL (ref 0–0.2)
BASOPHILS NFR BLD AUTO: 0 %
BILIRUB SERPL-MCNC: 0.3 MG/DL (ref 0–1.2)
BUN SERPL-MCNC: 16 MG/DL (ref 8–27)
BUN/CREAT SERPL: 23 (ref 12–28)
CALCIUM SERPL-MCNC: 9.4 MG/DL (ref 8.7–10.3)
CHLORIDE SERPL-SCNC: 103 MMOL/L (ref 96–106)
CO2 SERPL-SCNC: 27 MMOL/L (ref 20–29)
CREAT SERPL-MCNC: 0.7 MG/DL (ref 0.57–1)
ENDOMYSIUM IGA SER QL: NEGATIVE
EOSINOPHIL # BLD AUTO: 0.1 X10E3/UL (ref 0–0.4)
EOSINOPHIL NFR BLD AUTO: 5 %
ERYTHROCYTE [DISTWIDTH] IN BLOOD BY AUTOMATED COUNT: 11.9 % (ref 11.7–15.4)
GLIADIN PEPTIDE IGA SER-ACNC: 4 UNITS (ref 0–19)
GLIADIN PEPTIDE IGG SER-ACNC: 2 UNITS (ref 0–19)
GLOBULIN SER-MCNC: 1.7 G/DL (ref 1.5–4.5)
GLUCOSE SERPL-MCNC: 86 MG/DL (ref 65–99)
HCT VFR BLD AUTO: 37.9 % (ref 34–46.6)
HGB BLD-MCNC: 13.2 G/DL (ref 11.1–15.9)
IGA SERPL-MCNC: 46 MG/DL (ref 87–352)
IMM GRANULOCYTES # BLD: 0 X10E3/UL (ref 0–0.1)
IMM GRANULOCYTES NFR BLD: 0 %
LYMPHOCYTES # BLD AUTO: 1.1 X10E3/UL (ref 0.7–3.1)
LYMPHOCYTES NFR BLD AUTO: 45 %
MCH RBC QN AUTO: 29.7 PG (ref 26.6–33)
MCHC RBC AUTO-ENTMCNC: 34.8 G/DL (ref 31.5–35.7)
MCV RBC AUTO: 85 FL (ref 79–97)
MONOCYTES # BLD AUTO: 0.3 X10E3/UL (ref 0.1–0.9)
MONOCYTES NFR BLD AUTO: 11 %
MORPHOLOGY BLD-IMP: ABNORMAL
NEUTROPHILS # BLD AUTO: 1 X10E3/UL (ref 1.4–7)
NEUTROPHILS NFR BLD AUTO: 39 %
PLATELET # BLD AUTO: 172 X10E3/UL (ref 150–450)
POTASSIUM SERPL-SCNC: 4.7 MMOL/L (ref 3.5–5.2)
PROT SERPL-MCNC: 6.3 G/DL (ref 6–8.5)
RBC # BLD AUTO: 4.45 X10E6/UL (ref 3.77–5.28)
SL AMB EGFR AFRICAN AMERICAN: 109 ML/MIN/1.73
SL AMB EGFR NON AFRICAN AMERICAN: 94 ML/MIN/1.73
SODIUM SERPL-SCNC: 141 MMOL/L (ref 134–144)
TTG IGA SER-ACNC: <2 U/ML (ref 0–3)
TTG IGG SER-ACNC: <2 U/ML (ref 0–5)
WBC # BLD AUTO: 2.5 X10E3/UL (ref 3.4–10.8)

## 2021-11-01 ENCOUNTER — OFFICE VISIT (OUTPATIENT)
Dept: FAMILY MEDICINE CLINIC | Facility: CLINIC | Age: 61
End: 2021-11-01
Payer: COMMERCIAL

## 2021-11-01 VITALS
WEIGHT: 122 LBS | OXYGEN SATURATION: 98 % | HEIGHT: 64 IN | HEART RATE: 88 BPM | RESPIRATION RATE: 14 BRPM | BODY MASS INDEX: 20.83 KG/M2 | SYSTOLIC BLOOD PRESSURE: 118 MMHG | DIASTOLIC BLOOD PRESSURE: 68 MMHG | TEMPERATURE: 96.5 F

## 2021-11-01 DIAGNOSIS — D70.9 NEUTROPENIA, UNSPECIFIED TYPE (HCC): Primary | ICD-10-CM

## 2021-11-01 DIAGNOSIS — H61.22 LEFT EAR IMPACTED CERUMEN: Primary | ICD-10-CM

## 2021-11-01 PROCEDURE — 69210 REMOVE IMPACTED EAR WAX UNI: CPT | Performed by: FAMILY MEDICINE

## 2021-11-01 PROCEDURE — 99213 OFFICE O/P EST LOW 20 MIN: CPT | Performed by: FAMILY MEDICINE

## 2021-11-04 ENCOUNTER — TELEPHONE (OUTPATIENT)
Dept: HEMATOLOGY ONCOLOGY | Facility: CLINIC | Age: 61
End: 2021-11-04

## 2021-11-24 ENCOUNTER — TELEPHONE (OUTPATIENT)
Dept: PREADMISSION TESTING | Facility: HOSPITAL | Age: 61
End: 2021-11-24

## 2021-11-24 VITALS — BODY MASS INDEX: 20.83 KG/M2 | HEIGHT: 64 IN | WEIGHT: 122 LBS

## 2021-11-27 LAB
BASOPHILS # BLD AUTO: 0 X10E3/UL (ref 0–0.2)
BASOPHILS NFR BLD AUTO: 1 %
EOSINOPHIL # BLD AUTO: 0.1 X10E3/UL (ref 0–0.4)
EOSINOPHIL NFR BLD AUTO: 4 %
ERYTHROCYTE [DISTWIDTH] IN BLOOD BY AUTOMATED COUNT: 11.8 % (ref 11.7–15.4)
HCT VFR BLD AUTO: 37.7 % (ref 34–46.6)
HGB BLD-MCNC: 12.5 G/DL (ref 11.1–15.9)
IMM GRANULOCYTES # BLD: 0 X10E3/UL (ref 0–0.1)
IMM GRANULOCYTES NFR BLD: 0 %
LYMPHOCYTES # BLD AUTO: 1.7 X10E3/UL (ref 0.7–3.1)
LYMPHOCYTES NFR BLD AUTO: 45 %
MCH RBC QN AUTO: 28.7 PG (ref 26.6–33)
MCHC RBC AUTO-ENTMCNC: 33.2 G/DL (ref 31.5–35.7)
MCV RBC AUTO: 87 FL (ref 79–97)
MONOCYTES # BLD AUTO: 0.3 X10E3/UL (ref 0.1–0.9)
MONOCYTES NFR BLD AUTO: 8 %
NEUTROPHILS # BLD AUTO: 1.6 X10E3/UL (ref 1.4–7)
NEUTROPHILS NFR BLD AUTO: 42 %
PLATELET # BLD AUTO: 187 X10E3/UL (ref 150–450)
RBC # BLD AUTO: 4.35 X10E6/UL (ref 3.77–5.28)
WBC # BLD AUTO: 3.8 X10E3/UL (ref 3.4–10.8)

## 2021-11-29 ENCOUNTER — CONSULT (OUTPATIENT)
Dept: HEMATOLOGY ONCOLOGY | Facility: MEDICAL CENTER | Age: 61
End: 2021-11-29
Payer: COMMERCIAL

## 2021-11-29 VITALS
HEART RATE: 79 BPM | HEIGHT: 64 IN | DIASTOLIC BLOOD PRESSURE: 70 MMHG | WEIGHT: 123 LBS | RESPIRATION RATE: 16 BRPM | BODY MASS INDEX: 21 KG/M2 | SYSTOLIC BLOOD PRESSURE: 124 MMHG | OXYGEN SATURATION: 99 % | TEMPERATURE: 97.6 F

## 2021-11-29 DIAGNOSIS — R74.01 TRANSAMINITIS: ICD-10-CM

## 2021-11-29 DIAGNOSIS — R76.8 LOW SERUM IGA FOR AGE: ICD-10-CM

## 2021-11-29 DIAGNOSIS — D70.9 NEUTROPENIA, UNSPECIFIED TYPE (HCC): ICD-10-CM

## 2021-11-29 DIAGNOSIS — I47.1 SVT (SUPRAVENTRICULAR TACHYCARDIA) (HCC): Primary | ICD-10-CM

## 2021-11-29 PROCEDURE — 99214 OFFICE O/P EST MOD 30 MIN: CPT | Performed by: INTERNAL MEDICINE

## 2021-11-30 ENCOUNTER — OFFICE VISIT (OUTPATIENT)
Dept: CARDIOLOGY CLINIC | Facility: CLINIC | Age: 61
End: 2021-11-30
Payer: COMMERCIAL

## 2021-11-30 VITALS
WEIGHT: 122 LBS | BODY MASS INDEX: 20.83 KG/M2 | SYSTOLIC BLOOD PRESSURE: 110 MMHG | HEIGHT: 64 IN | HEART RATE: 69 BPM | OXYGEN SATURATION: 98 % | DIASTOLIC BLOOD PRESSURE: 70 MMHG | TEMPERATURE: 98 F

## 2021-11-30 DIAGNOSIS — F41.1 GENERALIZED ANXIETY DISORDER: ICD-10-CM

## 2021-11-30 DIAGNOSIS — R93.1 ELEVATED CORONARY ARTERY CALCIUM SCORE: ICD-10-CM

## 2021-11-30 DIAGNOSIS — E78.2 MIXED DYSLIPIDEMIA: ICD-10-CM

## 2021-11-30 DIAGNOSIS — Z86.79 HISTORY OF PSVT (PAROXYSMAL SUPRAVENTRICULAR TACHYCARDIA): Primary | ICD-10-CM

## 2021-11-30 DIAGNOSIS — R00.2 INTERMITTENT PALPITATIONS: ICD-10-CM

## 2021-11-30 PROCEDURE — 99214 OFFICE O/P EST MOD 30 MIN: CPT | Performed by: INTERNAL MEDICINE

## 2021-11-30 PROCEDURE — 1036F TOBACCO NON-USER: CPT | Performed by: INTERNAL MEDICINE

## 2021-11-30 PROCEDURE — 93000 ELECTROCARDIOGRAM COMPLETE: CPT | Performed by: INTERNAL MEDICINE

## 2021-11-30 PROCEDURE — 3008F BODY MASS INDEX DOCD: CPT | Performed by: INTERNAL MEDICINE

## 2021-12-01 RX ORDER — SODIUM CHLORIDE, SODIUM LACTATE, POTASSIUM CHLORIDE, CALCIUM CHLORIDE 600; 310; 30; 20 MG/100ML; MG/100ML; MG/100ML; MG/100ML
125 INJECTION, SOLUTION INTRAVENOUS CONTINUOUS
Status: CANCELLED | OUTPATIENT
Start: 2021-12-01

## 2021-12-02 ENCOUNTER — ANESTHESIA (OUTPATIENT)
Dept: GASTROENTEROLOGY | Facility: AMBULARY SURGERY CENTER | Age: 61
End: 2021-12-02

## 2021-12-02 ENCOUNTER — HOSPITAL ENCOUNTER (OUTPATIENT)
Dept: GASTROENTEROLOGY | Facility: AMBULARY SURGERY CENTER | Age: 61
Setting detail: OUTPATIENT SURGERY
Discharge: HOME | End: 2021-12-02
Attending: INTERNAL MEDICINE
Payer: COMMERCIAL

## 2021-12-02 ENCOUNTER — ANESTHESIA EVENT (OUTPATIENT)
Dept: GASTROENTEROLOGY | Facility: AMBULARY SURGERY CENTER | Age: 61
End: 2021-12-02

## 2021-12-02 VITALS
SYSTOLIC BLOOD PRESSURE: 150 MMHG | BODY MASS INDEX: 20.33 KG/M2 | RESPIRATION RATE: 16 BRPM | HEART RATE: 77 BPM | HEIGHT: 65 IN | TEMPERATURE: 96.6 F | WEIGHT: 122 LBS | OXYGEN SATURATION: 100 % | DIASTOLIC BLOOD PRESSURE: 77 MMHG

## 2021-12-02 DIAGNOSIS — R14.0 ABDOMINAL BLOATING: ICD-10-CM

## 2021-12-02 DIAGNOSIS — R74.8 ELEVATED LIVER ENZYMES: ICD-10-CM

## 2021-12-02 DIAGNOSIS — Z12.11 SCREEN FOR COLON CANCER: ICD-10-CM

## 2021-12-02 PROCEDURE — 88305 TISSUE EXAM BY PATHOLOGIST: CPT | Performed by: PATHOLOGY

## 2021-12-02 PROCEDURE — 45385 COLONOSCOPY W/LESION REMOVAL: CPT | Performed by: INTERNAL MEDICINE

## 2021-12-02 PROCEDURE — 43239 EGD BIOPSY SINGLE/MULTIPLE: CPT | Performed by: INTERNAL MEDICINE

## 2021-12-02 RX ORDER — PROPOFOL 10 MG/ML
INJECTION, EMULSION INTRAVENOUS CONTINUOUS PRN
Status: DISCONTINUED | OUTPATIENT
Start: 2021-12-02 | End: 2021-12-02

## 2021-12-02 RX ORDER — PROPOFOL 10 MG/ML
INJECTION, EMULSION INTRAVENOUS AS NEEDED
Status: DISCONTINUED | OUTPATIENT
Start: 2021-12-02 | End: 2021-12-02

## 2021-12-02 RX ORDER — SODIUM CHLORIDE, SODIUM LACTATE, POTASSIUM CHLORIDE, CALCIUM CHLORIDE 600; 310; 30; 20 MG/100ML; MG/100ML; MG/100ML; MG/100ML
125 INJECTION, SOLUTION INTRAVENOUS CONTINUOUS
Status: DISCONTINUED | OUTPATIENT
Start: 2021-12-02 | End: 2021-12-06 | Stop reason: HOSPADM

## 2021-12-02 RX ORDER — LIDOCAINE HYDROCHLORIDE 10 MG/ML
INJECTION, SOLUTION EPIDURAL; INFILTRATION; INTRACAUDAL; PERINEURAL AS NEEDED
Status: DISCONTINUED | OUTPATIENT
Start: 2021-12-02 | End: 2021-12-02

## 2021-12-02 RX ADMIN — PROPOFOL 50 MG: 10 INJECTION, EMULSION INTRAVENOUS at 11:14

## 2021-12-02 RX ADMIN — PROPOFOL 100 MG: 10 INJECTION, EMULSION INTRAVENOUS at 11:13

## 2021-12-02 RX ADMIN — PROPOFOL 100 MCG/KG/MIN: 10 INJECTION, EMULSION INTRAVENOUS at 11:18

## 2021-12-02 RX ADMIN — SODIUM CHLORIDE, SODIUM LACTATE, POTASSIUM CHLORIDE, AND CALCIUM CHLORIDE 125 ML/HR: .6; .31; .03; .02 INJECTION, SOLUTION INTRAVENOUS at 11:05

## 2021-12-02 RX ADMIN — LIDOCAINE HYDROCHLORIDE 50 MG: 10 INJECTION, SOLUTION EPIDURAL; INFILTRATION; INTRACAUDAL; PERINEURAL at 11:13

## 2022-05-27 ENCOUNTER — OFFICE VISIT (OUTPATIENT)
Dept: CARDIOLOGY CLINIC | Facility: CLINIC | Age: 62
End: 2022-05-27
Payer: COMMERCIAL

## 2022-05-27 VITALS
HEART RATE: 76 BPM | HEIGHT: 65 IN | DIASTOLIC BLOOD PRESSURE: 70 MMHG | TEMPERATURE: 97 F | BODY MASS INDEX: 19.83 KG/M2 | OXYGEN SATURATION: 97 % | WEIGHT: 119 LBS | SYSTOLIC BLOOD PRESSURE: 110 MMHG

## 2022-05-27 DIAGNOSIS — E78.2 MIXED DYSLIPIDEMIA: ICD-10-CM

## 2022-05-27 DIAGNOSIS — Z86.79 HISTORY OF PSVT (PAROXYSMAL SUPRAVENTRICULAR TACHYCARDIA): ICD-10-CM

## 2022-05-27 DIAGNOSIS — F41.1 GENERALIZED ANXIETY DISORDER: ICD-10-CM

## 2022-05-27 DIAGNOSIS — I47.1 PSVT (PAROXYSMAL SUPRAVENTRICULAR TACHYCARDIA) (HCC): Primary | ICD-10-CM

## 2022-05-27 DIAGNOSIS — R93.1 ELEVATED CORONARY ARTERY CALCIUM SCORE: ICD-10-CM

## 2022-05-27 DIAGNOSIS — R00.2 INTERMITTENT PALPITATIONS: ICD-10-CM

## 2022-05-27 PROCEDURE — 3008F BODY MASS INDEX DOCD: CPT | Performed by: INTERNAL MEDICINE

## 2022-05-27 PROCEDURE — 1036F TOBACCO NON-USER: CPT | Performed by: INTERNAL MEDICINE

## 2022-05-27 PROCEDURE — 93000 ELECTROCARDIOGRAM COMPLETE: CPT | Performed by: INTERNAL MEDICINE

## 2022-05-27 PROCEDURE — 99214 OFFICE O/P EST MOD 30 MIN: CPT | Performed by: INTERNAL MEDICINE

## 2022-05-27 NOTE — PATIENT INSTRUCTIONS
We have requested some blood work in the form of lipid panel and CMP to be done at [facility] whenever it is convenient for you  We will contact you regarding the results  Continue current medication  For episodes of rapid heart action, do Valsalva straining two or 3 times before resorting to facial ice water immersion  4    Cardiology follow-up approximately six months with EKG

## 2022-05-27 NOTE — PROGRESS NOTES
Office Cardiology Progress Note  Jacinta Vallejo 62 y o  female MRN: [id number]  05/27/22  7:23 PM      ASSESSMENT:    1  Recurrence of PSVT several months ago for 1-1/2 hours with original episode occurring on 03/23/2021 with associated non MI troponin increase and with subsequent normal nuclear stress test on 03/24/2021   2  Long history of intermittent brief palpitations, which have become much less frequent over the last 1 1 year  3  Longstanding mixed dyslipidemia with history of elevated lifetime ASCVD risk, and mildly elevated coronary calcium score of 8, with normal to below average 10 year CHD and arterial age risks, based on PANG data  4  Longstanding chronic generalized anxiety disorder  Plan       Patient Instructions     1  We have requested some blood work in the form of lipid panel and CMP to be done at [facility] whenever it is convenient for you  We will contact you regarding the results  2  Continue current medication  3  For episodes of rapid heart action, do Valsalva straining two or 3 times before resorting to facial ice water immersion  4    Cardiology follow-up approximately six months with EKG  HPI    This 62 y o  female  denies new cardiopulmonary and medical symptoms  Several months ago, the patient had a 1-1/2 hour long episode of rapid heart action with with gradual resolution after rest and facial ice water immersion  She has otherwise been clinically stable since her last cardiology visit nearly six months ago  The patient continues to work full-time as an  for the [employer], where she has worked for 13 years  She works from home two or three days a week and commutes to the office in [facility] the other days, working from 8 a m  to 4:30 p m  She is being seen in follow-up of the below listed diagnoses  Encounter Diagnoses   Name Primary?     PSVT (paroxysmal supraventricular tachycardia) (HCC) Yes    Intermittent palpitations  Elevated coronary artery calcium score     Mixed dyslipidemia     History of PSVT (paroxysmal supraventricular tachycardia)     Generalized anxiety disorder         Review of Systems    All other systems negative, except as noted in history of present illness    Historical Information   Past Medical History:   Diagnosis Date    Abdominal bloating     Hemorrhoid     Pityriasis rosea     SVT (supraventricular tachycardia) (HCC)      Past Surgical History:   Procedure Laterality Date    HYSTERECTOMY  2017    partial-ovaries remain     Social History     Substance and Sexual Activity   Alcohol Use Yes    Comment: rare     Social History     Substance and Sexual Activity   Drug Use Never     Social History     Tobacco Use   Smoking Status Never Smoker   Smokeless Tobacco Never Used       Family History:  Family History   Problem Relation Age of Onset    Breast cancer Mother     Cancer Mother         breast    Coronary artery disease Father     Cancer Father         lung    Cancer Maternal Aunt         lymphoma,uterine,breast         Meds/Allergies     Prior to Admission medications    Medication Sig Start Date End Date Taking?  Authorizing Provider   ascorbic acid (VITAMIN C) 500 MG tablet Take 500 mg by mouth 2 (two) times a week     Yes Historical Provider, MD   cholecalciferol (VITAMIN D3) 1,000 units tablet Take 1,000 Units by mouth 2 (two) times a week     Yes Historical Provider, MD   diphenhydrAMINE (BENADRYL) 25 mg tablet Take 25 mg by mouth daily at bedtime    Yes Historical Provider, MD   Ibuprofen 200 MG CAPS Take 1 capsule by mouth daily at bedtime    Yes Historical Provider, MD       Allergies   Allergen Reactions    Carvedilol GI Intolerance and Visual Disturbance    Metaxalone Hives     Reaction Date: [id number];     Penicillins Hives     Reaction Date: [id number];     Metoprolol Tartrate [Metoprolol] GI Intolerance         Vitals:    05/27/22 1050   BP: 110/70   BP Location: Left arm Patient Position: Sitting   Cuff Size: Standard   Pulse: 76   Temp: (!) 97 °F (36 1 °C)   SpO2: 97%   Weight: 54 kg (119 lb)   Height: 5' 4 5" (1 638 m)       Body mass index is 20 11 kg/m²  3 pound weight loss in approximately six months and 17 8 pound weight loss in approximately 1 9 years    Physical Exam:    General Appearance:  Alert, cooperative, no distress, appears stated age   Head:  Normocephalic, without obvious abnormality, atraumatic   Eyes:  PERRL, conjunctiva/corneas clear, EOM's intact,   both eyes   Ears:  Normal TM's and external ear canals, both ears   Nose: Nares normal, septum midline, mucosa normal, no drainage or sinus tenderness   Throat: Lips, mucosa, and tongue normal; teeth and gums normal   Neck: Supple, symmetrical, trachea midline, no adenopathy, thyroid: not enlarged, symmetric, no tenderness/mass/nodules, no carotid bruit or JVD   Back:   Symmetric, no curvature, ROM normal, no CVA tenderness   Lungs:   Clear to auscultation bilaterally, respirations unlabored   Chest Wall:  No tenderness or deformity   Heart:  Regular rate and rhythm, S1, S2 normal, no murmur, rub or gallop   Abdomen:   Soft, non-tender, bowel sounds active all four quadrants,  no masses, no organomegaly   Extremities: Extremities normal, atraumatic, no cyanosis or edema   Pulses: 2+ and symmetric   Skin: Skin showed normal color, texture, turgor and no rashes or lesions   Lymph nodes: Cervical, supraclavicular, and axillary nodes normal   Neurologic: Normal         Cardiographics    ECG 05/27/22:    Normal sinus rhythm at 76 bpm   Low QRS voltage  Otherwise normal ECG, unchanged from 11/30/2021    IMAGING:    No Chest XR results available for this patient      EGD 12/02/2021:    1 centimeter hiatal hernia and otherwise normal    Colonoscopy 12/02/2021:      One small polyp smaller than 5 millimeters removed by cold snare   Internal hemorrhoids      LAB REVIEW:      Lab Results   Component Value Date    SODIUM 141 10/27/2021    K 4 7 10/27/2021     10/27/2021    CO2 27 10/27/2021    BUN 16 10/27/2021    CREATININE 0 70 10/27/2021    GLUCOSE 90 12/26/2017    CALCIUM 8 8 03/28/2021    AST 23 10/27/2021    ALT 22 10/27/2021    ALKPHOS 91 03/28/2021    PROT 6 3 12/26/2017    BILITOT 0 3 12/26/2017    EGFR 97 03/28/2021     Lab Results   Component Value Date    CHOLESTEROL 203 (H) 05/22/2021     Lab Results   Component Value Date    HDL 34 (L) 05/22/2021    HDL 56 12/26/2017    HDL 50 09/23/2017       Lab Results   Component Value Date    LDLCALC 138 (H) 05/22/2021    LDLCALC 176 (H) 12/26/2017    LDLCALC 172 (H) 09/23/2017     No components found for: [facility]  Lab Results   Component Value Date    TRIG 173 (H) 05/22/2021    TRIG 72 12/26/2017    TRIG 78 09/23/2017               Severiano Gulling, MD

## 2022-07-08 ENCOUNTER — OFFICE VISIT (OUTPATIENT)
Dept: FAMILY MEDICINE CLINIC | Facility: CLINIC | Age: 62
End: 2022-07-08
Payer: COMMERCIAL

## 2022-07-08 ENCOUNTER — TELEPHONE (OUTPATIENT)
Dept: FAMILY MEDICINE CLINIC | Facility: CLINIC | Age: 62
End: 2022-07-08

## 2022-07-08 VITALS
WEIGHT: 121 LBS | TEMPERATURE: 97.6 F | BODY MASS INDEX: 20.16 KG/M2 | RESPIRATION RATE: 18 BRPM | DIASTOLIC BLOOD PRESSURE: 78 MMHG | OXYGEN SATURATION: 100 % | HEIGHT: 65 IN | HEART RATE: 87 BPM | SYSTOLIC BLOOD PRESSURE: 124 MMHG

## 2022-07-08 DIAGNOSIS — I47.1 SVT (SUPRAVENTRICULAR TACHYCARDIA) (HCC): ICD-10-CM

## 2022-07-08 DIAGNOSIS — R05.9 COUGH: ICD-10-CM

## 2022-07-08 DIAGNOSIS — U07.1 COVID-19: Primary | ICD-10-CM

## 2022-07-08 PROCEDURE — 3725F SCREEN DEPRESSION PERFORMED: CPT | Performed by: FAMILY MEDICINE

## 2022-07-08 PROCEDURE — 99213 OFFICE O/P EST LOW 20 MIN: CPT | Performed by: FAMILY MEDICINE

## 2022-07-08 RX ORDER — METHYLPREDNISOLONE 4 MG/1
TABLET ORAL
Qty: 21 TABLET | Refills: 0 | Status: SHIPPED | OUTPATIENT
Start: 2022-07-08 | End: 2022-07-14

## 2022-07-08 NOTE — PROGRESS NOTES
Assessment/Plan:    No problem-specific Assessment & Plan notes found for this encounter  mucinex dm  medrol if needed for cough and congestion  Avoid stimulant decongestants due to SVT    CDC quarantine guidelines were discussed/advised  She is beyond use of paxlovid based on timing  F/u prn  Supportive measures, fluids, vitamins D/C/zn    CDC quarantine guidelines were discussed/advised  Diagnoses and all orders for this visit:    COVID-19  -     methylPREDNISolone 4 MG tablet therapy pack; Use as directed on package    SVT (supraventricular tachycardia) (HCC)    Cough              Return if symptoms worsen or fail to improve  Subjective:      Patient ID: Po Toribio is a 62 y o  female  Chief Complaint   Patient presents with    COVID-19     System onset 7 days ago  Did [facility] PCR test   Mz cma       HPI  Son positive last week then she was pos by PCR today  Had fever at onset, no lately  Hoarse  Day 7 since start of symptoms  Coughing some   Tickle sensation  No discolored mucus  No wheezing  Not taking any cold meds  Benadryl qhs for sleep    No sob  Taste/smell ok    The following portions of the patient's history were reviewed and updated as appropriate: allergies, current medications, past family history, past medical history, past social history, past surgical history and problem list     Review of Systems   Constitutional: Negative for chills and fever  Respiratory: Positive for cough  Negative for wheezing            Current Outpatient Medications   Medication Sig Dispense Refill    diphenhydrAMINE (BENADRYL) 25 mg tablet Take 25 mg by mouth daily at bedtime       Ibuprofen 200 MG CAPS Take 1 capsule by mouth daily at bedtime       methylPREDNISolone 4 MG tablet therapy pack Use as directed on package 21 tablet 0    ascorbic acid (VITAMIN C) 500 MG tablet Take 500 mg by mouth 2 (two) times a week   (Patient not taking: Reported on 7/8/2022)      cholecalciferol (VITAMIN D3) 1,000 units tablet Take 1,000 Units by mouth 2 (two) times a week   (Patient not taking: Reported on 7/8/2022)       No current facility-administered medications for this visit  Objective:    /78   Pulse 87   Temp 97 6 °F (36 4 °C)   Resp 18   Ht 5' 4 5" (1 638 m)   Wt 54 9 kg (121 lb)   SpO2 100%   BMI 20 45 kg/m²        Physical Exam  Vitals and nursing note reviewed  Constitutional:       General: She is not in acute distress  Appearance: She is well-developed  She is not ill-appearing  HENT:      Head: Normocephalic  Right Ear: Tympanic membrane normal       Left Ear: Tympanic membrane normal    Eyes:      General: No scleral icterus  Conjunctiva/sclera: Conjunctivae normal    Cardiovascular:      Rate and Rhythm: Normal rate and regular rhythm  Pulmonary:      Effort: Pulmonary effort is normal  No respiratory distress  Breath sounds: Normal breath sounds  No stridor  No wheezing, rhonchi or rales  Abdominal:      General: There is no distension  Palpations: Abdomen is soft  Musculoskeletal:         General: No deformity  Cervical back: Neck supple  Skin:     General: Skin is warm and dry  Coloration: Skin is not pale  Neurological:      Mental Status: She is alert  Motor: No weakness  Gait: Gait normal    Psychiatric:         Mood and Affect: Mood normal          Behavior: Behavior normal          Thought Content:  Thought content normal                 Cailin Fuller DO

## 2022-07-08 NOTE — TELEPHONE ENCOUNTER
Nicholas Mccormack is 6th day with covid  She just wanted to know what she can take over the counter for her cough?   Please call patient back     Thank you

## 2022-07-14 ENCOUNTER — TELEPHONE (OUTPATIENT)
Dept: FAMILY MEDICINE CLINIC | Facility: CLINIC | Age: 62
End: 2022-07-14

## 2022-07-14 NOTE — TELEPHONE ENCOUNTER
Sofia saw Dr Rodger Villalobos for covid  She had off this week from work (vacation week) but slept a lot  She is still very fatigue and wants to know if Dr Rodger Villalobos can write her a note to work from home for a week or two  She normally works 2/3 days a week from home but thinks going into office would be too much  Can we call patient back  She is aware that dr Rodger Villalobos is not in until tomorrow

## 2022-08-31 ENCOUNTER — OFFICE VISIT (OUTPATIENT)
Dept: FAMILY MEDICINE CLINIC | Facility: CLINIC | Age: 62
End: 2022-08-31
Payer: COMMERCIAL

## 2022-08-31 VITALS
WEIGHT: 121 LBS | HEART RATE: 84 BPM | BODY MASS INDEX: 20.16 KG/M2 | TEMPERATURE: 97 F | RESPIRATION RATE: 16 BRPM | SYSTOLIC BLOOD PRESSURE: 130 MMHG | OXYGEN SATURATION: 99 % | HEIGHT: 65 IN | DIASTOLIC BLOOD PRESSURE: 82 MMHG

## 2022-08-31 DIAGNOSIS — H66.002 NON-RECURRENT ACUTE SUPPURATIVE OTITIS MEDIA OF LEFT EAR WITHOUT SPONTANEOUS RUPTURE OF TYMPANIC MEMBRANE: Primary | ICD-10-CM

## 2022-08-31 PROCEDURE — 99213 OFFICE O/P EST LOW 20 MIN: CPT | Performed by: NURSE PRACTITIONER

## 2022-08-31 RX ORDER — DOXYCYCLINE HYCLATE 100 MG/1
100 CAPSULE ORAL EVERY 12 HOURS SCHEDULED
Qty: 14 CAPSULE | Refills: 0 | Status: SHIPPED | OUTPATIENT
Start: 2022-08-31 | End: 2022-09-06 | Stop reason: ALTCHOICE

## 2022-08-31 NOTE — PROGRESS NOTES
Assessment/Plan:    Worried about cross reactivity reaction with Cephalosporins given PCN allergy  Avoid Sudafed d/t history of SVT  Start Flonase, discussed s/s of TM rupture  1  Non-recurrent acute suppurative otitis media of left ear without spontaneous rupture of tympanic membrane  -     doxycycline hyclate (VIBRAMYCIN) 100 mg capsule; Take 1 capsule (100 mg total) by mouth every 12 (twelve) hours for 7 days          There are no Patient Instructions on file for this visit  No follow-ups on file  Subjective:      Patient ID: Dian Evans is a 65 y o  female  Chief Complaint   Patient presents with    Earache     Left ear pain and clogged nm  lpn    nasal drip     Symptoms started 7 days ago    Cough     From nasal drip       For the past week, she has had URI symptoms  Has continued congestion and now left ear pain  No associated fevers/chills  Reports cough secondary to PND  Taking Benadryl      The following portions of the patient's history were reviewed and updated as appropriate: allergies, current medications, past family history, past medical history, past social history, past surgical history and problem list     Review of Systems   Constitutional: Negative for chills, fatigue and fever  HENT: Positive for congestion, ear pain (left ), postnasal drip and sinus pressure  Negative for rhinorrhea and sore throat  Respiratory: Positive for cough (secondary to drip)  Negative for shortness of breath and wheezing  Cardiovascular: Negative for chest pain  Gastrointestinal: Negative for abdominal pain, diarrhea, nausea and vomiting  Musculoskeletal: Negative for arthralgias  Skin: Negative for rash  Neurological: Negative for headaches           Current Outpatient Medications   Medication Sig Dispense Refill    diphenhydrAMINE (BENADRYL) 25 mg tablet Take 25 mg by mouth daily at bedtime       doxycycline hyclate (VIBRAMYCIN) 100 mg capsule Take 1 capsule (100 mg total) by mouth every 12 (twelve) hours for 7 days 14 capsule 0    Ibuprofen 200 MG CAPS Take 1 capsule by mouth daily at bedtime        No current facility-administered medications for this visit  Objective:    /82   Pulse 84   Temp (!) 97 °F (36 1 °C)   Resp 16   Ht 5' 4 5" (1 638 m)   Wt 54 9 kg (121 lb)   SpO2 99%   BMI 20 45 kg/m²        Physical Exam  Vitals and nursing note reviewed  Constitutional:       Appearance: Normal appearance  She is well-developed  HENT:      Right Ear: Tympanic membrane and external ear normal       Left Ear: External ear normal  A middle ear effusion is present  There is hemotympanum  Tympanic membrane is erythematous and bulging  Nose: Congestion present  Mouth/Throat:      Pharynx: Oropharynx is clear  Eyes:      Conjunctiva/sclera: Conjunctivae normal    Cardiovascular:      Rate and Rhythm: Normal rate and regular rhythm  Heart sounds: Normal heart sounds  No murmur heard  Pulmonary:      Effort: Pulmonary effort is normal       Breath sounds: Normal breath sounds  Lymphadenopathy:      Cervical: No cervical adenopathy  Skin:     General: Skin is warm and dry  Neurological:      Mental Status: She is alert     Psychiatric:         Mood and Affect: Mood normal          Behavior: Behavior normal                 RAFA Chanel

## 2022-09-02 ENCOUNTER — TELEPHONE (OUTPATIENT)
Dept: FAMILY MEDICINE CLINIC | Facility: CLINIC | Age: 62
End: 2022-09-02

## 2022-09-02 DIAGNOSIS — H66.002 NON-RECURRENT ACUTE SUPPURATIVE OTITIS MEDIA OF LEFT EAR WITHOUT SPONTANEOUS RUPTURE OF TYMPANIC MEMBRANE: Primary | ICD-10-CM

## 2022-09-02 RX ORDER — FLUTICASONE PROPIONATE 50 MCG
2 SPRAY, SUSPENSION (ML) NASAL DAILY
Qty: 16 G | Refills: 1 | Status: SHIPPED | OUTPATIENT
Start: 2022-09-02

## 2022-09-02 NOTE — TELEPHONE ENCOUNTER
Sofia would like Flofabricio called into her pharmacy    She saw George Marianoivett on 8/31 and thought she had Flonase at home but she doesn't

## 2022-09-06 ENCOUNTER — OFFICE VISIT (OUTPATIENT)
Dept: FAMILY MEDICINE CLINIC | Facility: CLINIC | Age: 62
End: 2022-09-06
Payer: COMMERCIAL

## 2022-09-06 VITALS
BODY MASS INDEX: 20.29 KG/M2 | HEART RATE: 77 BPM | HEIGHT: 65 IN | RESPIRATION RATE: 18 BRPM | WEIGHT: 121.8 LBS | SYSTOLIC BLOOD PRESSURE: 108 MMHG | OXYGEN SATURATION: 99 % | TEMPERATURE: 96.3 F | DIASTOLIC BLOOD PRESSURE: 80 MMHG

## 2022-09-06 DIAGNOSIS — T78.40XA ALLERGIC REACTION, INITIAL ENCOUNTER: ICD-10-CM

## 2022-09-06 DIAGNOSIS — H66.002 NON-RECURRENT ACUTE SUPPURATIVE OTITIS MEDIA OF LEFT EAR WITHOUT SPONTANEOUS RUPTURE OF TYMPANIC MEMBRANE: Primary | ICD-10-CM

## 2022-09-06 DIAGNOSIS — Z12.31 ENCOUNTER FOR SCREENING MAMMOGRAM FOR MALIGNANT NEOPLASM OF BREAST: ICD-10-CM

## 2022-09-06 PROCEDURE — 99214 OFFICE O/P EST MOD 30 MIN: CPT | Performed by: FAMILY MEDICINE

## 2022-09-06 RX ORDER — AZITHROMYCIN 250 MG/1
TABLET, FILM COATED ORAL
Qty: 6 TABLET | Refills: 0 | Status: SHIPPED | OUTPATIENT
Start: 2022-09-06 | End: 2022-09-11

## 2022-09-06 NOTE — PROGRESS NOTES
Assessment/Plan:       Diagnoses and all orders for this visit:    Non-recurrent acute suppurative otitis media of left ear without spontaneous rupture of tympanic membrane  Comments:  Stop doxycycline due to allergic reaction    Will try azithromcyin  Orders:  -     azithromycin (Zithromax) 250 mg tablet; Take 2 tablets (500 mg total) by mouth daily for 1 day, THEN 1 tablet (250 mg total) daily for 4 days  Allergic reaction, initial encounter  Comments:  Advised to stop doxycycline  Benadryl PRN for rash  Doxycycline added to her allergy list      Encounter for screening mammogram for malignant neoplasm of breast  -     Mammo screening bilateral w 3d & cad; Future        Subjective:      Patient ID: Cami Castellanos is a 65 y o  female  HPI  Niles Jones presents today for possible allergic reaction to doxycycline  She started taking doxycycline on 8/31/22 for an ear infection  Yesterday noticed a rash on her groin, pelvis, abdomen, hands, arms  Unchanged  Last dose of doxycycline was yesterday  Has been taking benadryl  She continues to have left sided ear pain and decreased hearing  The following portions of the patient's history were reviewed and updated as appropriate: allergies, current medications, past family history, past medical history, past social history, past surgical history and problem list     Review of Systems   Constitutional: Negative  HENT: Positive for ear pain and hearing loss  Eyes: Negative  Respiratory: Negative  Cardiovascular: Negative  Gastrointestinal: Negative  Endocrine: Negative  Genitourinary: Negative  Musculoskeletal: Negative  Skin: Positive for rash  Allergic/Immunologic: Negative  Neurological: Negative  Hematological: Negative  Psychiatric/Behavioral: Negative            Objective:      /80   Pulse 77   Temp (!) 96 3 °F (35 7 °C)   Resp 18   Ht 5' 4 5" (1 638 m)   Wt 55 2 kg (121 lb 12 8 oz)   SpO2 99%   BMI 20 58 kg/m² Physical Exam  Constitutional:       General: She is not in acute distress  Appearance: She is well-developed  She is not diaphoretic  HENT:      Head: Normocephalic and atraumatic  Right Ear: Hearing, tympanic membrane, ear canal and external ear normal       Left Ear: Decreased hearing noted  Tympanic membrane is injected and erythematous  Eyes:      General: No scleral icterus  Right eye: No discharge  Left eye: No discharge  Conjunctiva/sclera: Conjunctivae normal    Pulmonary:      Effort: Pulmonary effort is normal    Musculoskeletal:      Cervical back: Normal range of motion  Skin:     General: Skin is warm  Findings: Rash (erythematous maculopapular rash on groin, pelvis, abdomen, arms) present  Neurological:      Mental Status: She is alert and oriented to person, place, and time  Psychiatric:         Behavior: Behavior normal          Thought Content:  Thought content normal          Judgment: Judgment normal

## 2022-09-06 NOTE — LETTER
September 6, 2022     Patient: Dian Evans  YOB: 1960  Date of Visit: 9/6/2022      To Whom it May Concern:    Dian Evans is under my professional care  Pilar Lewis was seen in my office on 9/6/2022  I recommend that Pilar Lewis work from home until 9/13/22  If you have any questions or concerns, please don't hesitate to call           Sincerely,          Pollo Moody MD

## 2022-09-12 ENCOUNTER — OFFICE VISIT (OUTPATIENT)
Dept: FAMILY MEDICINE CLINIC | Facility: CLINIC | Age: 62
End: 2022-09-12
Payer: COMMERCIAL

## 2022-09-12 ENCOUNTER — TELEPHONE (OUTPATIENT)
Dept: FAMILY MEDICINE CLINIC | Facility: CLINIC | Age: 62
End: 2022-09-12

## 2022-09-12 VITALS
RESPIRATION RATE: 18 BRPM | BODY MASS INDEX: 19.99 KG/M2 | WEIGHT: 120 LBS | DIASTOLIC BLOOD PRESSURE: 70 MMHG | HEART RATE: 75 BPM | SYSTOLIC BLOOD PRESSURE: 98 MMHG | TEMPERATURE: 98.2 F | OXYGEN SATURATION: 95 % | HEIGHT: 65 IN

## 2022-09-12 DIAGNOSIS — H65.92 OTITIS MEDIA, SEROUS, TM RUPTURE, LEFT: Primary | ICD-10-CM

## 2022-09-12 DIAGNOSIS — H72.92 OTITIS MEDIA, SEROUS, TM RUPTURE, LEFT: Primary | ICD-10-CM

## 2022-09-12 PROBLEM — H61.23 BILATERAL IMPACTED CERUMEN: Status: RESOLVED | Noted: 2019-03-19 | Resolved: 2022-09-12

## 2022-09-12 PROCEDURE — 99213 OFFICE O/P EST LOW 20 MIN: CPT | Performed by: FAMILY MEDICINE

## 2022-09-12 RX ORDER — METHYLPREDNISOLONE 4 MG/1
TABLET ORAL
Qty: 21 TABLET | Refills: 0 | Status: SHIPPED | OUTPATIENT
Start: 2022-09-12 | End: 2022-09-18

## 2022-09-12 NOTE — PROGRESS NOTES
Assessment/Plan:    No problem-specific Assessment & Plan notes found for this encounter  Diagnoses and all orders for this visit:    Otitis media, serous, tm rupture, left  -     Ambulatory Referral to Otolaryngology; Future  -     methylPREDNISolone 4 MG tablet therapy pack; Use as directed on package      advised keep dry, avoid submersion  F/u if any blood or dc from ear  Medrol risks/use  mucinex  Continue flonase  Could offer audio/tymp in 4w if no better  ENT option    Return if symptoms worsen or fail to improve  Subjective:      Patient ID: Urvashi Gaytan is a 65 y o  female  Chief Complaint   Patient presents with    Earache     Sas/cma       HPI  Rash on 5th day of doxycycline, bump red, mostly trunk but also arms  Fingers swelling also  Rings were tight  Changed to zpack which she has had before  Rash not worse on zpack  Rash improving off doxycycline    Can't hear from left side  Occasional shooting pain  Echoes in ear  Stopped flonase  Benadryl at hs  No sudafed due to PSVT  No mucinex    The following portions of the patient's history were reviewed and updated as appropriate: allergies, current medications, past family history, past medical history, past social history, past surgical history and problem list     Review of Systems   Respiratory: Negative for shortness of breath  Skin: Positive for rash  Current Outpatient Medications   Medication Sig Dispense Refill    diphenhydrAMINE (BENADRYL) 25 mg tablet Take 25 mg by mouth daily at bedtime       fluticasone (FLONASE) 50 mcg/act nasal spray 2 sprays into each nostril daily 16 g 1    Ibuprofen 200 MG CAPS Take 1 capsule by mouth daily at bedtime       methylPREDNISolone 4 MG tablet therapy pack Use as directed on package 21 tablet 0     No current facility-administered medications for this visit         Objective:    BP 98/70   Pulse 75   Temp 98 2 °F (36 8 °C)   Resp 18   Ht 5' 4 5" (1 638 m)   Wt 54 4 kg (120 lb) SpO2 95%   BMI 20 28 kg/m²        Physical Exam  Constitutional:       General: She is not in acute distress  Appearance: She is not ill-appearing  HENT:      Right Ear: Tympanic membrane, ear canal and external ear normal  There is no impacted cerumen  Left Ear: Ear canal and external ear normal       Ears:      Comments: Left TM perforation anterior  Inferior aspect  Eyes:      General: No scleral icterus  Neck:      Vascular: No carotid bruit  Cardiovascular:      Rate and Rhythm: Regular rhythm  Heart sounds: No murmur heard  Pulmonary:      Breath sounds: No wheezing  Skin:     Coloration: Skin is not jaundiced or pale  Findings: No rash                  Chayito Valdez, DO

## 2022-10-09 LAB
ALBUMIN SERPL-MCNC: 4.5 G/DL (ref 3.8–4.8)
ALBUMIN/GLOB SERPL: 2.6 {RATIO} (ref 1.2–2.2)
ALP SERPL-CCNC: 99 IU/L (ref 44–121)
ALT SERPL-CCNC: 24 IU/L (ref 0–32)
AST SERPL-CCNC: 22 IU/L (ref 0–40)
BILIRUB SERPL-MCNC: 0.2 MG/DL (ref 0–1.2)
BUN SERPL-MCNC: 22 MG/DL (ref 8–27)
BUN/CREAT SERPL: 30 (ref 12–28)
CALCIUM SERPL-MCNC: 8.8 MG/DL (ref 8.7–10.3)
CHLORIDE SERPL-SCNC: 103 MMOL/L (ref 96–106)
CHOLEST SERPL-MCNC: 233 MG/DL (ref 100–199)
CHOLEST/HDLC SERPL: 6.1 RATIO (ref 0–4.4)
CO2 SERPL-SCNC: 29 MMOL/L (ref 20–29)
CREAT SERPL-MCNC: 0.73 MG/DL (ref 0.57–1)
EGFR: 94 ML/MIN/1.73
GLOBULIN SER-MCNC: 1.7 G/DL (ref 1.5–4.5)
GLUCOSE SERPL-MCNC: 84 MG/DL (ref 70–99)
HDLC SERPL-MCNC: 38 MG/DL
LDLC SERPL CALC-MCNC: 170 MG/DL (ref 0–99)
POTASSIUM SERPL-SCNC: 4.6 MMOL/L (ref 3.5–5.2)
PROT SERPL-MCNC: 6.2 G/DL (ref 6–8.5)
SL AMB VLDL CHOLESTEROL CALC: 25 MG/DL (ref 5–40)
SODIUM SERPL-SCNC: 140 MMOL/L (ref 134–144)
TRIGL SERPL-MCNC: 137 MG/DL (ref 0–149)

## 2022-10-12 PROBLEM — Z12.11 SCREEN FOR COLON CANCER: Status: RESOLVED | Noted: 2018-10-23 | Resolved: 2022-10-12

## 2022-10-25 ENCOUNTER — RA CDI HCC (OUTPATIENT)
Dept: OTHER | Facility: HOSPITAL | Age: 62
End: 2022-10-25

## 2022-10-25 NOTE — PROGRESS NOTES
[facility] 75  coding opportunities       Chart reviewed, no opportunity found: CHART REVIEWED, NO OPPORTUNITY FOUND        Patients Insurance        Commercial Insurance: Cristopher Carr

## 2022-11-01 ENCOUNTER — OFFICE VISIT (OUTPATIENT)
Dept: CARDIOLOGY CLINIC | Facility: CLINIC | Age: 62
End: 2022-11-01

## 2022-11-01 VITALS
HEIGHT: 65 IN | DIASTOLIC BLOOD PRESSURE: 82 MMHG | OXYGEN SATURATION: 99 % | TEMPERATURE: 97.9 F | WEIGHT: 121 LBS | SYSTOLIC BLOOD PRESSURE: 124 MMHG | HEART RATE: 63 BPM | BODY MASS INDEX: 20.16 KG/M2

## 2022-11-01 DIAGNOSIS — I47.1 PSVT (PAROXYSMAL SUPRAVENTRICULAR TACHYCARDIA) (HCC): Primary | ICD-10-CM

## 2022-11-01 DIAGNOSIS — E78.2 MIXED DYSLIPIDEMIA: ICD-10-CM

## 2022-11-01 DIAGNOSIS — F41.1 GENERALIZED ANXIETY DISORDER: ICD-10-CM

## 2022-11-01 DIAGNOSIS — R93.1 ELEVATED CORONARY ARTERY CALCIUM SCORE: ICD-10-CM

## 2022-11-01 NOTE — PROGRESS NOTES
Office Cardiology Progress Note  Leobardo Crouch 65 y o  female MRN: [id number]  11/01/22  9:52 AM      ASSESSMENT:    1  Recurrence of PSVT  approximately 8-9 months ago for 1-1/2 hours with original episode occurring on 03/23/2021 with associated non MI troponin increase and with subsequent normal nuclear stress test on 03/24/2021   2  Long history of intermittent brief palpitations, which have become much less frequent over the last 1 5 years  3  Longstanding mixed dyslipidemia with history of elevated lifetime ASCVD risk, and mildly elevated coronary calcium score of 8, with normal to below average 10 year CHD and arterial age risks, based on PANG data  Current 10 year ASCVD risk is 4 8%  4  Longstanding chronic generalized anxiety disorder  Plan       Patient Instructions     1  Try to reduce quantity of dark chocolate consumption  2  A prescription refill was sent to your local pharmacy for metoprolol tartrate 25 milligrams up to twice a day as needed for rapid heart action  3  Cardiology follow-up approximately six months with EKG  4  Consider follow-up coronary CT scan in approximately 1-1/2 years  HPI    This 65 y o  female  denies new cardiopulmonary and medical symptoms  She specifically denies any recurrence of rapid heart action or palpitations  The patient continues to work from home three days a week and 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[employer], where she has worked for 13-1/2 years  She is the power of  and takes care of legal matters for her 17-year-old mother, who is  living in an assisted living facility in [place], [place]  Recently, the patient has been under some stress trying to get her mother to update her will  The patient developed a rash and itching after receiving doxycycline for an ear infection, subsequently replaced by azithromycin use uneventfully        The patient admits to excessive eating of dark chocolate as a stress relief habit and wonders about its contribution to her worsened lipid see status  She admits to no structured exercise  The patient is also being seen in follow-up of the below listed diagnoses  Encounter Diagnoses   Name Primary? • PSVT (paroxysmal supraventricular tachycardia) (HCC) Yes   • Mixed dyslipidemia    • Elevated coronary artery calcium score    • Generalized anxiety disorder         Review of Systems    All other systems negative, except as noted in history of present illness    Historical Information   Past Medical History:   Diagnosis Date   • Abdominal bloating    • Hemorrhoid    • Pityriasis rosea    • SVT (supraventricular tachycardia) (HCC)      Past Surgical History:   Procedure Laterality Date   • HYSTERECTOMY  2017    partial-ovaries remain     Social History     Substance and Sexual Activity   Alcohol Use Yes    Comment: rare     Social History     Substance and Sexual Activity   Drug Use Never     Social History     Tobacco Use   Smoking Status Never Smoker   Smokeless Tobacco Never Used       Family History:  Family History   Problem Relation Age of Onset   • Breast cancer Mother    • Cancer Mother         breast   • Coronary artery disease Father    • Cancer Father         lung   • Cancer Maternal Aunt         lymphoma,uterine,breast         Meds/Allergies     Prior to Admission medications    Medication Sig Start Date End Date Taking?  Authorizing Provider   diphenhydrAMINE (BENADRYL) 25 mg tablet Take 25 mg by mouth daily at bedtime    Yes Historical Provider, MD   Ibuprofen 200 MG CAPS Take 1 capsule by mouth daily at bedtime    Yes Historical Provider, MD   metoprolol tartrate (LOPRESSOR) 25 mg tablet Take 1 tablet (25 mg total) by mouth 2 (two) times a day as needed (Rapid heartbeat) 11/1/22  Yes Akshat Crowley MD   fluticasone [facility]) 50 mcg/act nasal spray 2 sprays into each nostril daily  Patient not taking: Reported on 11/1/2022 9/2/22 11/1/22  Eleanor Ramos RAFA Hansen       Allergies   Allergen Reactions   • Carvedilol GI Intolerance and Visual Disturbance   • Metaxalone Hives     Reaction Date: [id number];    • Penicillins Hives     Reaction Date: [id number];    • Doxycycline Rash   • Metoprolol Tartrate [Metoprolol] GI Intolerance         Vitals:    11/01/22 0858   BP: 124/82   Pulse: 63   Temp: 97 9 °F (36 6 °C)   SpO2: 99%   Weight: 54 9 kg (121 lb)   Height: 5' 4 5" (1 638 m)       Body mass index is 20 45 kg/m²    2 pound weight gain in approximately 5+ months and 15 8 pound weight loss in approximately 2 3 years    Physical Exam:    General Appearance:  Alert, cooperative, no distress, appears stated age   Head:  Normocephalic, without obvious abnormality, atraumatic   Eyes:  PERRL, conjunctiva/corneas clear, EOM's intact,   both eyes   Ears:  Normal TM's and external ear canals, both ears   Nose: Nares normal, septum midline, mucosa normal, no drainage or sinus tenderness   Throat: Lips, mucosa, and tongue normal; teeth and gums normal   Neck: Supple, symmetrical, trachea midline, no adenopathy, thyroid: not enlarged, symmetric, no tenderness/mass/nodules, no carotid bruit or JVD   Back:   Symmetric, no curvature, ROM normal, no CVA tenderness   Lungs:   Clear to auscultation bilaterally, respirations unlabored   Chest Wall:  No tenderness or deformity   Heart:  Regular rate and rhythm, S1, S2 normal, no murmur, rub or gallop   Abdomen:   Soft, non-tender, bowel sounds active all four quadrants,  no masses, no organomegaly   Extremities: Extremities normal, atraumatic, no cyanosis or edema   Pulses: 2+ and symmetric   Skin: Skin showed normal color, texture, turgor and no rashes or lesions   Lymph nodes: Cervical, supraclavicular, and axillary nodes normal   Neurologic: Normal         Cardiographics    ECG 11/01/22:    Normal sinus rhythm at 63 bpm   Low QRS voltage in limb leads   Otherwise normal ECG, unchanged from 05/27/2022    IMAGING:    No Chest XR results available for this patient            LAB REVIEW:      Lab Results   Component Value Date    SODIUM 140 10/08/2022    K 4 6 10/08/2022     10/08/2022    CO2 29 10/08/2022    BUN 22 10/08/2022    CREATININE 0 73 10/08/2022    GLUCOSE 90 12/26/2017    CALCIUM 8 8 03/28/2021    AST 22 10/08/2022    ALT 24 10/08/2022    ALKPHOS 91 03/28/2021    PROT 6 3 12/26/2017    BILITOT 0 3 12/26/2017    EGFR 94 10/08/2022     Lab Results   Component Value Date    CHOLESTEROL 233 (H) 10/08/2022    CHOLESTEROL 203 (H) 05/22/2021     Lab Results   Component Value Date    HDL 38 (L) 10/08/2022    HDL 34 (L) 05/22/2021    HDL 56 12/26/2017         Lab Results   Component Value Date    LDLCALC 170 (H) 10/08/2022    LDLCALC 138 (H) 05/22/2021    LDLCALC 176 (H) 12/26/2017     No components found for: [facility]  Lab Results   Component Value Date    TRIG 137 10/08/2022    TRIG 173 (H) 05/22/2021    TRIG 72 12/26/2017     4 8% 10 year ASCVD risk          [facility]

## 2022-11-01 NOTE — PATIENT INSTRUCTIONS
Try to reduce quantity of dark chocolate consumption  A prescription refill sent to your local pharmacy for metoprolol tartrate 25 milligrams up to twice a day as needed for rapid heart action  Cardiology follow-up approximately six months with EKG  Consider follow-up coronary CT scan in approximately 1-1/2 years

## 2022-11-03 ENCOUNTER — OFFICE VISIT (OUTPATIENT)
Dept: FAMILY MEDICINE CLINIC | Facility: CLINIC | Age: 62
End: 2022-11-03

## 2022-11-03 VITALS
HEIGHT: 65 IN | RESPIRATION RATE: 16 BRPM | DIASTOLIC BLOOD PRESSURE: 78 MMHG | WEIGHT: 122 LBS | BODY MASS INDEX: 20.33 KG/M2 | SYSTOLIC BLOOD PRESSURE: 134 MMHG | HEART RATE: 81 BPM | TEMPERATURE: 95 F | OXYGEN SATURATION: 99 %

## 2022-11-03 DIAGNOSIS — L65.9 ALOPECIA: ICD-10-CM

## 2022-11-03 DIAGNOSIS — Z00.00 WELL ADULT EXAM: Primary | ICD-10-CM

## 2022-11-03 PROBLEM — H72.92 OTITIS MEDIA, SEROUS, TM RUPTURE, LEFT: Status: RESOLVED | Noted: 2022-09-12 | Resolved: 2022-11-03

## 2022-11-03 PROBLEM — H65.92 OTITIS MEDIA, SEROUS, TM RUPTURE, LEFT: Status: RESOLVED | Noted: 2022-09-12 | Resolved: 2022-11-03

## 2022-11-03 NOTE — PROGRESS NOTES
FAMILY PRACTICE HEALTH MAINTENANCE OFFICE VISIT  [facility]    NAME: Curtis Claude  AGE: 65 y o  SEX: female  : 1960     DATE: 11/3/2022    Assessment and Plan     1  Well adult exam    2  Alopecia  Comments: Will check TSH  Suspect this is genetic and was recommended rogaine  She would like to see derm for an opinion as well  Orders:  -     TSH, 3rd generation with Free T4 reflex; Future  -     Ambulatory Referral to Dermatology; Future      Patient Counseling:   Nutrition: Stressed importance of a well balanced diet, moderation of sodium/saturated fat, caloric balance and sufficient intake of fiber  Exercise: Stressed the importance of regular exercise with a goal of 150 minutes per week  Dental Health: Discussed daily flossing and brushing and regular dental visits     Immunizations reviewed: Risks and Benefits discussed  Discussed benefits of:  Colon Cancer Screening, Mammogram , Cervical Cancer screening and Screening labs  BMI Counseling: Body mass index is 20 62 kg/m²  Discussed with patient's BMI with her  The BMI is normal    Return in about 1 year (around 11/3/2023) for Annual physical         Chief Complaint     Chief Complaint   Patient presents with   • Physical Exam     Aviva Tran         History of Present Illness     Here for CPE  She feels she has been losing her hair and it is thinning  Her mother was the same way  She is very worried about this  Otherwise denies cold or heat intolerance, diarrhea or constipation, weight changes         Well Adult Physical   Patient here for a comprehensive physical exam       Diet and Physical Activity  Diet: well balanced diet  Exercise: rarely      Depression Screen  PHQ-2/9 Depression Screening            General Health  Hearing: Normal:  Diminished on the left; seeing ENT  Vision: no vision problems  Dental: regular dental visits    Reproductive Health  No issues  and Post-menopausal       The following portions of the patient's history were reviewed and updated as appropriate: allergies, current medications, past family history, past medical history, past social history, past surgical history and problem list     Review of Systems     Review of Systems   Constitutional: Negative  HENT: Negative  Eyes: Negative  Respiratory: Negative  Cardiovascular: Negative  Gastrointestinal: Negative  Endocrine: Negative  Genitourinary: Negative  Musculoskeletal: Negative  Skin: Negative  As per HPI  Allergic/Immunologic: Negative  Neurological: Negative  Hematological: Negative  Psychiatric/Behavioral: Negative          Past Medical History     Past Medical History:   Diagnosis Date   • Abdominal bloating    • Hemorrhoid    • Pityriasis rosea    • SVT (supraventricular tachycardia) (HCC)        Past Surgical History     Past Surgical History:   Procedure Laterality Date   • HYSTERECTOMY  2017    partial-ovaries remain       Social History     Social History     Socioeconomic History   • Marital status: /Civil Union     Spouse name: None   • Number of children: None   • Years of education: None   • Highest education level: None   Occupational History   • None   Tobacco Use   • Smoking status: Never Smoker   • Smokeless tobacco: Never Used   Vaping Use   • Vaping Use: Never used   Substance and Sexual Activity   • Alcohol use: Yes     Comment: rare   • Drug use: Never   • Sexual activity: None     Comment: partial hysterectomy   Other Topics Concern   • None   Social History Narrative   • None     Social Determinants of Health     Financial Resource Strain: Not on file   Food Insecurity: Not on file   Transportation Needs: Not on file   Physical Activity: Not on file   Stress: Not on file   Social Connections: Not on file   Intimate Partner Violence: Not on file   Housing Stability: Not on file       Family History     Family History   Problem Relation Age of Onset   • Breast cancer Mother    • Cancer Mother         breast   • Coronary artery disease Father    • Cancer Father         lung   • Cancer Maternal Aunt         lymphoma,uterine,breast       Current Medications       Current Outpatient Medications:   •  diphenhydrAMINE (BENADRYL) 25 mg tablet, Take 25 mg by mouth daily at bedtime , Disp: , Rfl:   •  Ibuprofen 200 MG CAPS, Take 1 capsule by mouth daily at bedtime , Disp: , Rfl:   •  metoprolol tartrate (LOPRESSOR) 25 mg tablet, Take 1 tablet (25 mg total) by mouth 2 (two) times a day as needed (Rapid heartbeat), Disp: 30 tablet, Rfl: 5     Allergies     Allergies   Allergen Reactions   • Carvedilol GI Intolerance and Visual Disturbance   • Metaxalone Hives     Reaction Date: [id number];    • Penicillins Hives     Reaction Date: [id number];    • Doxycycline Rash   • Metoprolol Tartrate [Metoprolol] GI Intolerance       Objective     /78   Pulse 81   Temp (!) 95 °F (35 °C)   Resp 16   Ht 5' 4 5" (1 638 m)   Wt 55 3 kg (122 lb)   SpO2 99%   BMI 20 62 kg/m²      Physical Exam  Constitutional:       General: She is not in acute distress  Appearance: She is well-developed  She is not diaphoretic  HENT:      Head: Normocephalic and atraumatic  Right Ear: External ear normal       Left Ear: External ear normal    Neck:      Thyroid: No thyromegaly  Cardiovascular:      Rate and Rhythm: Normal rate and regular rhythm  Heart sounds: Normal heart sounds  No murmur heard  No friction rub  No gallop  Pulmonary:      Effort: Pulmonary effort is normal       Breath sounds: Normal breath sounds  No wheezing or rales  Abdominal:      General: Bowel sounds are normal       Palpations: Abdomen is soft  There is no mass  Tenderness: There is no abdominal tenderness  There is no rebound  Musculoskeletal:         General: No deformity  Normal range of motion  Cervical back: Normal range of motion and neck supple     Lymphadenopathy:      Cervical: No cervical adenopathy  Skin:     General: Skin is warm and dry  Findings: No rash  Neurological:      Mental Status: She is alert and oriented to person, place, and time  Cranial Nerves: No cranial nerve deficit  Motor: No abnormal muscle tone  Coordination: Coordination normal       Deep Tendon Reflexes: Reflexes are normal and symmetric  Reflexes normal    Psychiatric:         Behavior: Behavior normal          Thought Content:  Thought content normal          Judgment: Judgment normal            No exam data present        [address] [facility]

## 2022-11-23 DIAGNOSIS — Z12.31 ENCOUNTER FOR SCREENING MAMMOGRAM FOR MALIGNANT NEOPLASM OF BREAST: ICD-10-CM

## 2023-01-02 PROBLEM — H92.02 EAR PAIN, LEFT: Status: RESOLVED | Noted: 2019-03-19 | Resolved: 2023-01-02

## 2023-01-02 PROBLEM — R74.8 ELEVATED LIVER ENZYMES: Status: RESOLVED | Noted: 2021-10-21 | Resolved: 2023-01-02

## 2023-01-02 PROBLEM — R53.83 OTHER FATIGUE: Status: RESOLVED | Noted: 2021-03-31 | Resolved: 2023-01-02

## 2023-01-02 PROBLEM — U07.1 COVID-19: Status: RESOLVED | Noted: 2022-07-08 | Resolved: 2023-01-02

## 2023-01-02 PROBLEM — R74.01 TRANSAMINITIS: Status: RESOLVED | Noted: 2021-03-23 | Resolved: 2023-01-02

## 2023-02-20 DIAGNOSIS — H66.002 NON-RECURRENT ACUTE SUPPURATIVE OTITIS MEDIA OF LEFT EAR WITHOUT SPONTANEOUS RUPTURE OF TYMPANIC MEMBRANE: ICD-10-CM

## 2023-02-20 DIAGNOSIS — J02.9 SORE THROAT: Primary | ICD-10-CM

## 2023-02-20 RX ORDER — AZITHROMYCIN 250 MG/1
TABLET, FILM COATED ORAL
Qty: 6 TABLET | Refills: 0 | Status: SHIPPED | OUTPATIENT
Start: 2023-02-20 | End: 2023-02-25

## 2023-03-12 LAB — TSH SERPL DL<=0.005 MIU/L-ACNC: 2.02 UIU/ML (ref 0.45–4.5)

## 2023-03-29 ENCOUNTER — OFFICE VISIT (OUTPATIENT)
Dept: FAMILY MEDICINE CLINIC | Facility: CLINIC | Age: 63
End: 2023-03-29

## 2023-03-29 VITALS
SYSTOLIC BLOOD PRESSURE: 124 MMHG | TEMPERATURE: 97.2 F | WEIGHT: 123.2 LBS | RESPIRATION RATE: 16 BRPM | BODY MASS INDEX: 20.53 KG/M2 | DIASTOLIC BLOOD PRESSURE: 86 MMHG | HEIGHT: 65 IN | HEART RATE: 86 BPM

## 2023-03-29 DIAGNOSIS — J01.00 ACUTE NON-RECURRENT MAXILLARY SINUSITIS: Primary | ICD-10-CM

## 2023-03-29 DIAGNOSIS — J31.0 CHRONIC RHINITIS: ICD-10-CM

## 2023-03-29 DIAGNOSIS — Z86.79 HISTORY OF PSVT (PAROXYSMAL SUPRAVENTRICULAR TACHYCARDIA): ICD-10-CM

## 2023-03-29 PROBLEM — J20.9 ACUTE BRONCHITIS: Status: RESOLVED | Noted: 2023-03-29 | Resolved: 2023-03-29

## 2023-03-29 PROBLEM — J20.9 ACUTE BRONCHITIS: Status: ACTIVE | Noted: 2023-03-29

## 2023-03-29 RX ORDER — AZITHROMYCIN 250 MG/1
TABLET, FILM COATED ORAL
Qty: 6 TABLET | Refills: 0 | Status: SHIPPED | OUTPATIENT
Start: 2023-03-29 | End: 2023-04-03

## 2023-03-29 NOTE — PROGRESS NOTES
Assessment/Plan:    No problem-specific Assessment & Plan notes found for this encounter  Sinusitis new  mucinex DM suggested  flonase if willing, rash in past?  abx  F/u if no better    PSVT hx  Metoprolol prn from cardiologist  Stay hydrated  Avoid stimulant decongestants    AR stable but active  Suggest daily H1b when feeling better  Can try saline if can't use INS     Diagnoses and all orders for this visit:    Acute non-recurrent maxillary sinusitis  -     azithromycin (ZITHROMAX) 250 mg tablet; Take 500mg on day 1, 250mg on days 2-5    History of PSVT (paroxysmal supraventricular tachycardia)    Chronic rhinitis      Return if symptoms worsen or fail to improve  Subjective:      Patient ID: Areli Joseph is a 65 y o  female  Chief Complaint   Patient presents with   • Nasal Congestion     Symptoms started about 9 days ago nm  lpn   • Sore Throat   • post nasal drip       HPI  9d  As above  Not improving  Hoarse and nasally  Sinus pressure  Cough and nasal drip  Green nasal dc this am  No otc  Head pressure    The following portions of the patient's history were reviewed and updated as appropriate: allergies, current medications, past family history, past medical history, past social history, past surgical history and problem list     Review of Systems   Respiratory: Negative for shortness of breath and wheezing  Current Outpatient Medications   Medication Sig Dispense Refill   • azithromycin (ZITHROMAX) 250 mg tablet Take 500mg on day 1, 250mg on days 2-5 6 tablet 0   • diphenhydrAMINE (BENADRYL) 25 mg tablet Take 25 mg by mouth daily at bedtime      • Ibuprofen 200 MG CAPS Take 1 capsule by mouth daily at bedtime      • metoprolol tartrate (LOPRESSOR) 25 mg tablet Take 1 tablet (25 mg total) by mouth 2 (two) times a day as needed (Rapid heartbeat) (Patient not taking: Reported on 3/29/2023) 30 tablet 5     No current facility-administered medications for this visit         Objective:    /86 " Pulse 86   Temp (!) 97 2 °F (36 2 °C)   Resp 16   Ht 5' 4 5\" (1 638 m)   Wt 55 9 kg (123 lb 3 2 oz)   BMI 20 82 kg/m²        Physical Exam  Vitals and nursing note reviewed  Constitutional:       General: She is not in acute distress  Appearance: She is well-developed  She is not ill-appearing  HENT:      Head: Normocephalic  Right Ear: Tympanic membrane and ear canal normal  There is no impacted cerumen  Left Ear: Tympanic membrane and ear canal normal  There is no impacted cerumen  Nose: Congestion and rhinorrhea present  Comments: Boggy reddish purple turbinates b/l  Eyes:      Conjunctiva/sclera: Conjunctivae normal    Cardiovascular:      Rate and Rhythm: Normal rate and regular rhythm  Pulmonary:      Effort: Pulmonary effort is normal  No respiratory distress  Breath sounds: No wheezing  Abdominal:      Palpations: Abdomen is soft  Musculoskeletal:         General: No deformity  Cervical back: Neck supple  Skin:     General: Skin is warm and dry  Coloration: Skin is not pale  Neurological:      Mental Status: She is alert  Motor: No weakness  Gait: Gait normal    Psychiatric:         Mood and Affect: Mood normal          Behavior: Behavior normal          Thought Content:  Thought content normal                 Sherie Begum DO    "

## 2023-05-24 ENCOUNTER — OFFICE VISIT (OUTPATIENT)
Dept: FAMILY MEDICINE CLINIC | Facility: CLINIC | Age: 63
End: 2023-05-24

## 2023-05-24 VITALS
SYSTOLIC BLOOD PRESSURE: 130 MMHG | HEART RATE: 70 BPM | WEIGHT: 124 LBS | DIASTOLIC BLOOD PRESSURE: 86 MMHG | RESPIRATION RATE: 16 BRPM | TEMPERATURE: 98.1 F | BODY MASS INDEX: 20.96 KG/M2

## 2023-05-24 DIAGNOSIS — L30.9 ECZEMA, UNSPECIFIED TYPE: Primary | ICD-10-CM

## 2023-05-24 RX ORDER — TRIAMCINOLONE ACETONIDE 1 MG/G
CREAM TOPICAL 2 TIMES DAILY
Qty: 80 G | Refills: 0 | Status: SHIPPED | OUTPATIENT
Start: 2023-05-24

## 2023-05-24 NOTE — PROGRESS NOTES
Name: Niraj Real      : 1960      MRN: [id number]  Encounter Provider: Nanci Childs MD  Encounter Date: 2023   Encounter department: [address] [place] [address]     1  Eczema, unspecified type  Comments:  Recommend follow up with dermatology if symptoms do not improve  Orders:  -     triamcinolone (KENALOG) 0 1 % cream; Apply topically 2 (two) times a day Sparingly to affected area:           Subjective      Rash  This is a recurrent problem  The current episode started 1 to 4 weeks ago  The problem is unchanged  The affected locations include the face  The rash is characterized by dryness, itchiness and redness  It is unknown if there was an exposure to a precipitant  Pertinent negatives include no anorexia, congestion, cough, decreased physical activity, decreased responsiveness, decreased sleep, drinking less, diarrhea, facial edema, fatigue, fever, itching, joint pain, rhinorrhea, shortness of breath, sore throat or vomiting  Past treatments include moisturizer  The treatment provided no relief  Her past medical history is significant for eczema  Review of Systems   Constitutional: Negative for decreased responsiveness, fatigue and fever  HENT: Negative for congestion, rhinorrhea and sore throat  Respiratory: Negative for cough and shortness of breath  Gastrointestinal: Negative for anorexia, diarrhea and vomiting  Musculoskeletal: Negative for joint pain  Skin: Positive for rash  Negative for itching         Current Outpatient Medications on File Prior to Visit   Medication Sig   • diphenhydrAMINE (BENADRYL) 25 mg tablet Take 25 mg by mouth daily at bedtime    • Ibuprofen 200 MG CAPS Take 1 capsule by mouth daily at bedtime    • metoprolol tartrate (LOPRESSOR) 25 mg tablet Take 1 tablet (25 mg total) by mouth 2 (two) times a day as needed (Rapid heartbeat)       Objective     /86   Pulse 70   Temp 98 1 °F (36 7 °C)   Resp 16   Wt 56 2 kg (124 lb) BMI 20 96 kg/m²     Physical Exam  Constitutional:       General: She is not in acute distress  Appearance: She is well-developed  She is not diaphoretic  HENT:      Head: Normocephalic and atraumatic  Eyes:      General: No scleral icterus  Right eye: No discharge  Left eye: No discharge  Conjunctiva/sclera: Conjunctivae normal    Pulmonary:      Effort: Pulmonary effort is normal    Musculoskeletal:      Cervical back: Normal range of motion  Skin:     General: Skin is warm  Findings: Rash (mild dry skin around upper and lower eyelids b/l) present  Neurological:      Mental Status: She is alert and oriented to person, place, and time  Psychiatric:         Behavior: Behavior normal          Thought Content:  Thought content normal          Judgment: Judgment normal        Yobani Henry MD

## 2023-05-28 PROBLEM — J01.00 ACUTE NON-RECURRENT MAXILLARY SINUSITIS: Status: RESOLVED | Noted: 2023-03-29 | Resolved: 2023-05-28

## 2023-08-18 ENCOUNTER — CONSULT (OUTPATIENT)
Dept: CARDIOLOGY CLINIC | Facility: CLINIC | Age: 63
End: 2023-08-18
Payer: COMMERCIAL

## 2023-08-18 VITALS
SYSTOLIC BLOOD PRESSURE: 120 MMHG | DIASTOLIC BLOOD PRESSURE: 82 MMHG | HEART RATE: 77 BPM | OXYGEN SATURATION: 97 % | HEIGHT: 65 IN | BODY MASS INDEX: 20.49 KG/M2 | WEIGHT: 123 LBS

## 2023-08-18 DIAGNOSIS — I47.1 SVT (SUPRAVENTRICULAR TACHYCARDIA) (HCC): Primary | ICD-10-CM

## 2023-08-18 DIAGNOSIS — E78.2 MIXED DYSLIPIDEMIA: ICD-10-CM

## 2023-08-18 DIAGNOSIS — R93.1 ELEVATED CORONARY ARTERY CALCIUM SCORE: ICD-10-CM

## 2023-08-18 PROCEDURE — 93000 ELECTROCARDIOGRAM COMPLETE: CPT | Performed by: INTERNAL MEDICINE

## 2023-08-18 PROCEDURE — 99214 OFFICE O/P EST MOD 30 MIN: CPT | Performed by: INTERNAL MEDICINE

## 2023-08-18 NOTE — PROGRESS NOTES
Cardiology   Linnea Vogt DO, Providence Krabbe, MD, Tequila Palacio MD, Gaston Tobias MD, [facility] - [place]  -------------------------------------------------------------------  [facility]ly [facility]  [address] [place] [address], [address] [place] [address], [address] [place] [address]  [phone]    Cardiology Follow Up  Marc Heath  1960  [id number]          Assessment/Plan:    1. SVT (supraventricular tachycardia) (HCC)    2. Elevated coronary artery calcium score    3. Mixed dyslipidemia      -Patient with intermittent palpitations. She is reconsidering ablation procedure and will be referred to electrophysiologist to discuss procedure in further detail.  -Use metoprolol 12.5 mg twice daily to see if palpitations improve.  -Her last lipid panel was reviewed. There has been improvement in her lipid levels. She will have repeat with her primary medical doctor. May consider low-dose rosuvastatin if levels are not at goal.  Overall, she is low risk for 10-year cardiovascular events. Interval History:     Marc Heath is 59 y.o. female here for followup of SVT. The patient follows with Dr. Juice Zambrano but is switching due to scheduling. She has a history of supraventricular tachycardia. She had an episode in 2021 which brought her to the emergency room. Stress test at that time was normal.  Since then, she has intermittent palpitations. She had 1 sustained episode of SVT approximately 1 year ago which resolved with using metoprolol and resting. She was seen by electrophysiologist who discussed ablation procedure with her. She did not wish to have procedure done at that time. She has reconsidered and is willing to have ablation procedure done but would like to be treated at [facility] where her mother receives most of her care. She denies any chest pain or shortness of breath. Does not drink alcohol.   She had blood work done recently at [facility] which showed an improvement of her cholesterol levels compared to previously documented levels. Her total cholesterol was 170 mg/dL. The following portions of the patient's history were reviewed and updated as appropriate: allergies, current medications, past family history, past medical history, past social history, past surgical history, and problem list.       Current Outpatient Medications:   •  diphenhydrAMINE (BENADRYL) 25 mg tablet, Take 25 mg by mouth daily at bedtime , Disp: , Rfl:   •  Ibuprofen 200 MG CAPS, Take 1 capsule by mouth daily at bedtime , Disp: , Rfl:   •  metoprolol tartrate (LOPRESSOR) 25 mg tablet, Take 1 tablet (25 mg total) by mouth 2 (two) times a day as needed (Rapid heartbeat), Disp: 30 tablet, Rfl: 5  •  triamcinolone (KENALOG) 0.1 % cream, Apply topically 2 (two) times a day Sparingly to affected area:, Disp: 80 g, Rfl: 0        Review of Systems:  Review of Systems   Respiratory: Negative for shortness of breath. Cardiovascular: Positive for palpitations. Negative for chest pain and leg swelling. All other systems reviewed and are negative. Physical Exam:  Vitals:  Vitals:    08/18/23 1500   BP: 120/82   BP Location: Right arm   Patient Position: Sitting   Cuff Size: Standard   Pulse: 77   SpO2: 97%   Weight: 55.8 kg (123 lb)   Height: 5' 4.5" (1.638 m)     Physical Exam   Constitutional: She appears healthy. No distress. Eyes: Pupils are equal, round, and reactive to light. Conjunctivae are normal.   Neck: No JVD present. Cardiovascular: Normal rate, regular rhythm and normal heart sounds. Exam reveals no gallop and no friction rub. No murmur heard. Pulmonary/Chest: Effort normal and breath sounds normal. She has no wheezes. She has no rales. Musculoskeletal:         General: No tenderness, deformity or edema. Cervical back: Normal range of motion and neck supple. Neurological: She is alert and oriented to person, place, and time. Skin: Skin is warm and dry. Cardiographics:  EKG: Personally reviewed normal ECG  Last known EF: 60%    This note was completed in part utilizing M-Modal Fluency Direct Software. Grammatical errors, random word insertions, spelling mistakes, and incomplete sentences can be an occasional consequence of this system secondary to software limitations, ambient noise, and hardware issues. If you have any questions or concerns about the content, text, or information contained within the body of this dictation, please contact the provider for clarification.

## 2023-10-02 ENCOUNTER — PATIENT MESSAGE (OUTPATIENT)
Dept: FAMILY MEDICINE CLINIC | Facility: CLINIC | Age: 63
End: 2023-10-02

## 2023-10-02 DIAGNOSIS — Z12.31 ENCOUNTER FOR SCREENING MAMMOGRAM FOR BREAST CANCER: Primary | ICD-10-CM

## 2023-11-01 NOTE — TELEPHONE ENCOUNTER
Pt needs order for mammogram going next week to another facility,not [facility]  She states she requested this last week  Pls call when ready for   Zygomaticofacial Flap Text: Given the location of the defect, shape of the defect and the proximity to free margins a zygomaticofacial flap was deemed most appropriate for repair.  Using a sterile surgical marker, the appropriate flap was drawn incorporating the defect and placing the expected incisions within the relaxed skin tension lines where possible. The area thus outlined was incised deep to adipose tissue with a #15 scalpel blade with preservation of a vascular pedicle.  The skin margins were undermined to an appropriate distance in all directions utilizing iris scissors.  The flap was then placed into the defect and anchored with interrupted buried subcutaneous sutures.

## 2023-11-15 ENCOUNTER — TELEPHONE (OUTPATIENT)
Age: 63
End: 2023-11-15

## 2023-11-15 NOTE — TELEPHONE ENCOUNTER
Erna from [facility] calling requesting office fax over mammo order in chart ordered by Dr. Sarah Mcghee. Fax [phone]    Please note when faxed. Thank you!

## 2023-12-06 ENCOUNTER — OFFICE VISIT (OUTPATIENT)
Dept: FAMILY MEDICINE CLINIC | Facility: CLINIC | Age: 63
End: 2023-12-06
Payer: COMMERCIAL

## 2023-12-06 VITALS
SYSTOLIC BLOOD PRESSURE: 132 MMHG | RESPIRATION RATE: 18 BRPM | HEART RATE: 76 BPM | DIASTOLIC BLOOD PRESSURE: 80 MMHG | BODY MASS INDEX: 21.6 KG/M2 | WEIGHT: 127.8 LBS | TEMPERATURE: 98.6 F

## 2023-12-06 DIAGNOSIS — J06.9 ACUTE URI: Primary | ICD-10-CM

## 2023-12-06 PROCEDURE — 99213 OFFICE O/P EST LOW 20 MIN: CPT | Performed by: NURSE PRACTITIONER

## 2023-12-06 RX ORDER — BENZONATATE 200 MG/1
200 CAPSULE ORAL 3 TIMES DAILY PRN
Qty: 20 CAPSULE | Refills: 0 | Status: SHIPPED | OUTPATIENT
Start: 2023-12-06 | End: 2023-12-06

## 2023-12-06 RX ORDER — AZITHROMYCIN 250 MG/1
TABLET, FILM COATED ORAL
Qty: 6 TABLET | Refills: 0 | Status: SHIPPED | OUTPATIENT
Start: 2023-12-06 | End: 2023-12-11

## 2023-12-06 NOTE — PATIENT INSTRUCTIONS
Azithromycin (By mouth)   Azithromycin (qs-[id number]-adz-MYE-sin)  Treats infections. This medicine is a macrolide antibiotic. Brand Name(s): Zithromax, Zithromax Tri-Jamal, Zithromax Z-Jamal, Zmax   There may be other brand names for this medicine. When This Medicine Should Not Be Used: This medicine is not right for everyone. Do not use it if you had an allergic reaction to azithromycin, erythromycin, or similar medicines, or if you have a history of liver problems caused by azithromycin. How to Use This Medicine:   Capsule, Liquid, Packet, Powder, Tablet  Your doctor will tell you how much medicine to use. Do not use more than directed. Take all of the medicine in your prescription to clear up your infection, even if you feel better after the first few doses. Multiple dose (Zithromax® oral liquid or tablets): You may take this medicine with or without food. Shake the bottle well before you measure the medicine. Measure the oral liquid medicine with a marked measuring spoon, oral syringe, or medicine cup. Single dose (Zmax® extended-release oral liquid or powder):   Liquid:   Take this medicine on an empty stomach at least 1 hour before you eat, or 2 hours after you eat. Call your doctor right away if you vomit within 1 hour after you take the medicine. You must take the liquid within 12 hours after the pharmacist gives it to you. Shake the bottle well before you measure the medicine. Measure your dose with a marked measuring spoon, cup, or syringe. Powder:   Open 1 packet and pour all of the medicine into a glass with about 2 ounces (¼ cup) of water. Mix well and drink the medicine right away. Pour another 2 ounces of water into the same glass, and drink the remaining medicine. Read and follow the patient instructions that come with this medicine. Talk to your doctor or pharmacist if you have any questions. Missed dose: If you are taking multiple doses, take the dose as soon as you remember.  If it is almost time for your next dose, wait until then to take a regular dose. Do not use extra medicine to make up for a missed dose. Store the medicine in a closed container at room temperature, away from heat, moisture, and direct light. Extended-release oral liquid: Do not refrigerate or freeze. Oral liquid for 1 dose only: Store at room temperature. Do not store in the refrigerator or allow the medicine to freeze. Oral liquid for multiple doses: Store at room temperature or in the refrigerator. Use it within 10 days of filling the prescription. Drugs and Foods to Avoid:   Ask your doctor or pharmacist before using any other medicine, including over-the-counter medicines, vitamins, and herbal products. Some medicines can affect how azithromycin works. Tell your doctor if you are also using any of the following:  Colchicine, cyclosporine, digoxin, nelfinavir, phenytoin  Blood thinner (including warfarin)  Ergot medicine  Medicine for a heart rhythm problem (including amiodarone, dofetilide, procainamide, quinidine, sotalol)  Zithromax® for multiple doses: Do not take an antacid that contains magnesium or aluminum at the same time you take Zithromax®. An antacid will affect how the medicine works. Antacids will not affect Zmax® for single dose. Warnings While Using This Medicine:   Tell your doctor if you are pregnant or breastfeeding, or if you have kidney disease, liver disease, heart disease, heart rhythm problems, heart failure, or myasthenia gravis. Tell your doctor if anyone in your family has heart rhythm problems.   This medicine may cause the following problems:   Serious skin reactions, including Aragon-Bj syndrome, acute generalized exanthematous pustulosis, toxic epidermal necrolysis, and drug reaction with eosinophilia and systemic symptoms (DRESS)  Liver problems  Infantile hypertrophic pyloric stenosis  Heart rhythm problems, including QT prolongation  Increased risk of serious heart or blood vessel problems  This medicine can cause diarrhea. Call your doctor if the diarrhea becomes severe, does not stop, or is bloody. Do not take any medicine to stop diarrhea until you have talked to your doctor. Diarrhea can occur 2 months or more after you stop taking this medicine. It may occur 2 months or more after you stop using this medicine. Call your doctor if your symptoms do not improve or if they get worse. Your doctor will do lab tests at regular visits to check on the effects of this medicine. Keep all appointments. Keep all medicine out of the reach of children. Never share your medicine with anyone. Possible Side Effects While Using This Medicine:   Call your doctor right away if you notice any of these side effects: Allergic reaction: Itching or hives, swelling in your face or hands, swelling or tingling in your mouth or throat, chest tightness, trouble breathing  Blistering, peeling, red skin rash  Dark urine, pale stools, nausea, vomiting, loss of appetite, stomach pain, yellow skin or eyes  Fainting, dizziness, lightheadedness  Fast, pounding, or uneven heartbeat, blurred vision, chest pain, trouble breathing, tiredness or weakness  Feeling irritable or vomits after feeding (in babies)  Severe diarrhea that may contain blood, stomach cramps, fever  If you notice these less serious side effects, talk with your doctor:   Mild diarrhea, nausea, vomiting, stomach pain  If you notice other side effects that you think are caused by this medicine, tell your doctor. Call your doctor for medical advice about side effects. You may report side effects to FDA at 7-037-FDA-0854  © Copyright [facility] 2023 Information is for End User's use only and may not be sold, redistributed or otherwise used for commercial purposes. The above information is an  only. It is not intended as medical advice for individual conditions or treatments.  Talk to your doctor, nurse or pharmacist before following any medical regimen to see if it is safe and effective for you.

## 2023-12-06 NOTE — PROGRESS NOTES
Assessment/Plan:    1. Acute URI  -     azithromycin (ZITHROMAX) 250 mg tablet; Take 500mg on day 1, 250mg on days 2-5            Patient Instructions: Take medication with food. It is important that you take the entire course of antibiotics prescribed. May also take a probiotic of your choice to maintain healthy GI mary. Can take some probiotic and yogurt with the medication. Supportive care discussed and advised. Advised to RTO for any worsening and no improvement. Follow up for no improvement and worsening of conditions. Patient advised and educated when to see immediate medical care. Return if symptoms worsen or fail to improve. Future Appointments   Date Time Provider [address]   2/7/2024  4:40 PM DO KASIE Barcenas [facility]           Subjective:      Patient ID: Anthony Rutledge is a 59 y.o. female. Chief Complaint   Patient presents with   • sick visit      Sore throat, nasal drip and coughing, fatigued 5 days  HK          Vitals:  /80   Pulse 76   Temp 98.6 °F (37 °C)   Resp 18   Wt 58 kg (127 lb 12.8 oz)   BMI 21.60 kg/m²     Patient stated that started with sore throat, post nasal drip, congestion couple of days ago. Denies fever, chills and sob. Took mucinex yesterday. Patient does not want to use flonase nasal spray as had issues in past and benzonatate cough pills ordered but as patient is not comfortable taking due to her palpitations and cancelled. Cough  This is a new problem. The current episode started in the past 7 days. The problem has been unchanged. The cough is Non-productive. Associated symptoms include nasal congestion, postnasal drip, rhinorrhea and a sore throat. Pertinent negatives include no chest pain, chills, ear congestion, ear pain, fever, headaches, heartburn, hemoptysis, myalgias, shortness of breath or wheezing. The symptoms are aggravated by lying down.        The following portions of the patient's history were reviewed and updated as appropriate: allergies, current medications, past family history, past medical history, past social history, past surgical history and problem list.      Review of Systems   Constitutional:  Negative for chills, diaphoresis, fatigue, fever and unexpected weight change. HENT:  Positive for postnasal drip, rhinorrhea and sore throat. Negative for congestion, dental problem, drooling, ear discharge, ear pain, facial swelling, hearing loss, mouth sores, nosebleeds, sinus pressure, sinus pain, sneezing, tinnitus, trouble swallowing and voice change. Respiratory:  Positive for cough. Negative for hemoptysis, chest tightness, shortness of breath and wheezing. Cardiovascular: Negative. Negative for chest pain. Gastrointestinal:  Negative for abdominal pain, constipation, diarrhea, heartburn, nausea and vomiting. Musculoskeletal: Negative. Negative for myalgias. Skin: Negative. Neurological:  Negative for dizziness, light-headedness and headaches. Objective:    Social History     Tobacco Use   Smoking Status Never   Smokeless Tobacco Never       Allergies:    Allergies   Allergen Reactions   • Carvedilol GI Intolerance and Visual Disturbance   • Metaxalone Hives     Reaction Date: [id number];    • Penicillins Hives     Reaction Date: [id number];    • Doxycycline Rash   • Metoprolol Tartrate [Metoprolol] GI Intolerance         Current Outpatient Medications   Medication Sig Dispense Refill   • azithromycin (ZITHROMAX) 250 mg tablet Take 500mg on day 1, 250mg on days 2-5 6 tablet 0   • diphenhydrAMINE (BENADRYL) 25 mg tablet Take 25 mg by mouth daily at bedtime      • Ibuprofen 200 MG CAPS Take 1 capsule by mouth daily at bedtime      • metoprolol tartrate (LOPRESSOR) 25 mg tablet Take 1 tablet (25 mg total) by mouth 2 (two) times a day as needed (Rapid heartbeat) 30 tablet 5   • triamcinolone (KENALOG) 0.1 % cream Apply topically 2 (two) times a day Sparingly to affected area: 80 g 0     No current facility-administered medications for this visit. Physical Exam  Vitals reviewed. Constitutional:       Appearance: Normal appearance. She is well-developed. HENT:      Head: Normocephalic. Right Ear: Tympanic membrane, ear canal and external ear normal.      Left Ear: Tympanic membrane, ear canal and external ear normal.      Nose: Mucosal edema present. Right Sinus: No maxillary sinus tenderness or frontal sinus tenderness. Left Sinus: No maxillary sinus tenderness or frontal sinus tenderness. Mouth/Throat:      Mouth: No oral lesions. Pharynx: No oropharyngeal exudate or posterior oropharyngeal erythema. Cardiovascular:      Rate and Rhythm: Normal rate and regular rhythm. Heart sounds: Normal heart sounds. Pulmonary:      Effort: Pulmonary effort is normal.      Breath sounds: Normal breath sounds. Musculoskeletal:         General: Normal range of motion. Cervical back: Neck supple. Lymphadenopathy:      Cervical:      Right cervical: No superficial or posterior cervical adenopathy. Left cervical: No superficial or posterior cervical adenopathy. Skin:     General: Skin is warm and dry. Neurological:      Mental Status: She is alert and oriented to person, place, and time. Psychiatric:         Behavior: Behavior normal.         Thought Content:  Thought content normal.         Judgment: Judgment normal.                     RAFA Macdonald

## 2023-12-19 DIAGNOSIS — Z12.31 ENCOUNTER FOR SCREENING MAMMOGRAM FOR BREAST CANCER: ICD-10-CM

## 2023-12-22 ENCOUNTER — OFFICE VISIT (OUTPATIENT)
Dept: FAMILY MEDICINE CLINIC | Facility: CLINIC | Age: 63
End: 2023-12-22
Payer: COMMERCIAL

## 2023-12-22 VITALS
SYSTOLIC BLOOD PRESSURE: 142 MMHG | DIASTOLIC BLOOD PRESSURE: 82 MMHG | BODY MASS INDEX: 21.16 KG/M2 | HEIGHT: 65 IN | WEIGHT: 127 LBS | HEART RATE: 72 BPM | RESPIRATION RATE: 14 BRPM | TEMPERATURE: 98.6 F

## 2023-12-22 DIAGNOSIS — H69.91 DYSFUNCTION OF RIGHT EUSTACHIAN TUBE: Primary | ICD-10-CM

## 2023-12-22 PROCEDURE — 99213 OFFICE O/P EST LOW 20 MIN: CPT | Performed by: NURSE PRACTITIONER

## 2023-12-22 RX ORDER — CLARITHROMYCIN 500 MG/1
500 TABLET, COATED ORAL EVERY 12 HOURS SCHEDULED
Qty: 10 TABLET | Refills: 0 | Status: SHIPPED | OUTPATIENT
Start: 2023-12-22 | End: 2023-12-29 | Stop reason: SDUPTHER

## 2023-12-22 NOTE — PATIENT INSTRUCTIONS
Clarithromycin (By mouth)   Clarithromycin (anb-fkqk-lks-MYE-sin)  Treats and prevents infections. Also treats duodenal ulcers caused by H pylori. This medicine is a macrolide antibiotic.   Brand Name(s): Biaxin, Biaxin Filmtab, Biaxin XL, Prevpac   There may be other brand names for this medicine.  When This Medicine Should Not Be Used:   This medicine is not right for everyone. Do not use it if you had an allergic reaction to clarithromycin, erythromycin, or similar medicines, if you have a history of liver problems caused by clarithromycin, or if you are pregnant.  How to Use This Medicine:   Liquid, Tablet, Long Acting Tablet  Your doctor will tell you how much medicine to use. Do not use more than directed.  Take all of the medicine in your prescription to clear up your infection, even if you feel better after the first few doses.  If you take the extended-release tablet, part of the tablet may pass into your stools. This is normal and is nothing to worry about.  Extended-release tablets: These should be taken with food. Swallow the extended-release tablet whole. Do not break, crush, or chew it.  Tablets: These may be taken with or without food. Swallow the tablet whole. Do not break, crush, or chew it.  Oral liquid: This may also be taken with or without food. Shake the bottle well before you measure the medicine. Measure the oral liquid medicine with a marked measuring spoon, oral syringe, or medicine cup.  Missed dose: Take a dose as soon as you remember. If it is almost time for your next dose, wait until then and take a regular dose. Do not take extra medicine to make up for a missed dose.  Store the medicine in a closed container at room temperature, away from heat, moisture, and direct light. Do not refrigerate or freeze the oral liquid. Throw away any leftover oral liquid 14 days after you fill the prescription.  Drugs and Foods to Avoid:   Ask your doctor or pharmacist before using any other medicine,  including over-the-counter medicines, vitamins, and herbal products.  Do not use this medicine if you are also using cisapride, lomitapide, lovastatin, pimozide, simvastatin, or ergot medicines. Do use this medicine together with colchicine if you have kidney or liver disease, or with ranitidine bismuth citrate if you have kidney disease or a history of porphyria.  There are many other drugs that can interact with clarithromycin. Make sure your doctor knows about all other medicines you are using. Tell your doctor if you are also using any of the following:  Alfentanil, alprazolam, atorvastatin, bromocriptine, cilostazol, colchicine, cyclosporine, digoxin, itraconazole, midazolam, methylprednisolone, omeprazole, phenobarbital, phenytoin, pravastatin, quetiapine, rifabutin, sildenafil, tacrolimus, tadalafil, theophylline, tolterodine, triazolam, valproate, vardenafil, vinblastine, or Emma's wort  Blood pressure medicine  Blood thinner (including warfarin)  Insulin or diabetes medicine taken by mouth  Medicine for heart rhythm problems (including amiodarone, disopyramide, dofetilide, procainamide, quinidine, sotalol)  Medicine to treat HIV or AIDS  Seizure medicine (including carbamazepine)  If you also take zidovudine, clarithromycin and zidovudine should be taken at least 2 hours apart.  Warnings While Using This Medicine:   Tell your doctor if you are breastfeeding, or if you have kidney disease, liver disease, heart disease, diabetes, myasthenia gravis, or a history of porphyria. Tell your doctor if anyone in your family has heart rhythm problems.  This medicine may cause the following problems:  Serious skin reactions  Changes in heart rhythm (including QT prolongation)  Liver problems  This medicine may make you dizzy or confused. Do not drive or do anything else that could be dangerous until you know how this medicine affects you.  This medicine can cause diarrhea. Call your doctor if the diarrhea becomes  severe, does not stop, or is bloody. Do not take any medicine to stop diarrhea until you have talked to your doctor. Diarrhea can occur 2 months or more after you stop taking this medicine. It may occur 2 months or more after you stop using this medicine.  Call your doctor if your symptoms do not improve or if they get worse.  Your doctor will do lab tests at regular visits to check on the effects of this medicine. Keep all appointments.  Keep all medicine out of the reach of children. Never share your medicine with anyone.  Possible Side Effects While Using This Medicine:   Call your doctor right away if you notice any of these side effects:  Allergic reaction: Itching or hives, swelling in your face or hands, swelling or tingling in your mouth or throat, chest tightness, trouble breathing  Blistering, peeling, red skin rash  Dark urine, pale stools, loss of appetite, yellow skin or eyes  Fast, slow, pounding, or uneven heartbeat, chest pain  Severe diarrhea that may contain blood  Severe nausea, vomiting, stomach pain  If you notice these less serious side effects, talk with your doctor:   Change in taste  Mild diarrhea, nausea, vomiting, stomach pain  If you notice other side effects that you think are caused by this medicine, tell your doctor.   Call your doctor for medical advice about side effects. You may report side effects to FDA at 6-600-FDA-8304  © Copyright [employer] 2023 Information is for End User's use only and may not be sold, redistributed or otherwise used for commercial purposes.  The above information is an  only. It is not intended as medical advice for individual conditions or treatments. Talk to your doctor, nurse or pharmacist before following any medical regimen to see if it is safe and effective for you.

## 2023-12-22 NOTE — PROGRESS NOTES
"Assessment/Plan:  Advised to take corcidin HBPand if symptoms gets worse in 48 hours then will start antibiotic with food.    1. Dysfunction of right eustachian tube  -     clarithromycin (BIAXIN) 500 mg tablet; Take 1 tablet (500 mg total) by mouth every 12 (twelve) hours for 5 days      Patient Instructions:  Take medication with food.  It is important that you take the entire course of antibiotics prescribed.  May also take a probiotic of your choice to maintain healthy GI mary.    Can take some probiotic and yogurt with the medication.    Return if symptoms worsen or fail to improve.      Future Appointments   Date Time Provider Department Center   2/7/2024  4:40 PM DO [facility]           Subjective:      Patient ID: Sofia Akhtar is a 63 y.o. female.    Chief Complaint   Patient presents with   • Dizziness   • Ear Fullness     Right ear  Shiela Hernandez CMA    • Cold Like Symptoms     Patient states she was sick last weekend.   Took zpack 2 weeks ago          Vitals:  /82   Pulse 72   Temp 98.6 °F (37 °C)   Resp 14   Ht 5' 4.5\" (1.638 m)   Wt 57.6 kg (127 lb)   BMI 21.46 kg/m²     Patient stated that had flu on weekend and fever has resolved and other URI symptoms resolved but felt vertigo yesterday with some right ear discomfort. Denies fever, chills and sob    Dizziness  Pertinent negatives include no abdominal pain, chills, congestion, coughing, diaphoresis, fatigue, fever, headaches, nausea, sore throat or vomiting.   Ear Fullness   Pertinent negatives include no abdominal pain, coughing, diarrhea, ear discharge, headaches, hearing loss, rhinorrhea, sore throat or vomiting.               PHQ-2/9 Depression Screening    Little interest or pleasure in doing things: 0 - not at all  Feeling down, depressed, or hopeless: 0 - not at all  PHQ-2 Score: 0  PHQ-2 Interpretation: Negative depression screen             The following portions of the patient's history were reviewed and " updated as appropriate: allergies, current medications, past family history, past medical history, past social history, past surgical history and problem list.      Review of Systems   Constitutional:  Negative for chills, diaphoresis, fatigue, fever and unexpected weight change.   HENT:  Positive for ear pain. Negative for congestion, dental problem, drooling, ear discharge, facial swelling, hearing loss, mouth sores, nosebleeds, postnasal drip, rhinorrhea, sinus pressure, sinus pain, sneezing, sore throat, tinnitus, trouble swallowing and voice change.    Respiratory:  Negative for cough, chest tightness, shortness of breath and wheezing.    Cardiovascular: Negative.    Gastrointestinal:  Negative for abdominal pain, constipation, diarrhea, nausea and vomiting.   Musculoskeletal: Negative.    Skin: Negative.    Neurological:  Positive for dizziness. Negative for light-headedness and headaches.         Objective:    Social History     Tobacco Use   Smoking Status Never   Smokeless Tobacco Never       Allergies:   Allergies   Allergen Reactions   • Carvedilol GI Intolerance and Visual Disturbance   • Metaxalone Hives     Reaction Date: 31Oct2005;    • Penicillins Hives     Reaction Date: 12May2005;    • Doxycycline Rash   • Metoprolol Tartrate [Metoprolol] GI Intolerance         Current Outpatient Medications   Medication Sig Dispense Refill   • clarithromycin (BIAXIN) 500 mg tablet Take 1 tablet (500 mg total) by mouth every 12 (twelve) hours for 5 days 10 tablet 0   • diphenhydrAMINE (BENADRYL) 25 mg tablet Take 25 mg by mouth daily at bedtime      • Ibuprofen 200 MG CAPS Take 1 capsule by mouth daily at bedtime      • metoprolol tartrate (LOPRESSOR) 25 mg tablet Take 1 tablet (25 mg total) by mouth 2 (two) times a day as needed (Rapid heartbeat) 30 tablet 5   • triamcinolone (KENALOG) 0.1 % cream Apply topically 2 (two) times a day Sparingly to affected area: 80 g 0     No current facility-administered  medications for this visit.          Physical Exam  Vitals reviewed.   Constitutional:       Appearance: Normal appearance. She is well-developed.   HENT:      Head: Normocephalic.      Right Ear: Tympanic membrane, ear canal and external ear normal.      Left Ear: Tympanic membrane, ear canal and external ear normal.      Ears:      Comments: Scant fluid noted in right middle ear     Nose: Nose normal.      Right Sinus: No maxillary sinus tenderness or frontal sinus tenderness.      Left Sinus: No maxillary sinus tenderness or frontal sinus tenderness.      Mouth/Throat:      Mouth: No oral lesions.      Pharynx: No oropharyngeal exudate or posterior oropharyngeal erythema.   Cardiovascular:      Rate and Rhythm: Normal rate and regular rhythm.      Heart sounds: Normal heart sounds.   Pulmonary:      Effort: Pulmonary effort is normal.      Breath sounds: Normal breath sounds.   Musculoskeletal:         General: Normal range of motion.      Cervical back: Neck supple.   Lymphadenopathy:      Cervical:      Right cervical: No superficial or posterior cervical adenopathy.     Left cervical: No superficial or posterior cervical adenopathy.   Skin:     General: Skin is warm and dry.   Neurological:      Mental Status: She is alert and oriented to person, place, and time.   Psychiatric:         Behavior: Behavior normal.         Thought Content: Thought content normal.         Judgment: Judgment normal.                     RAFA Nunes

## 2023-12-27 ENCOUNTER — TELEPHONE (OUTPATIENT)
Age: 63
End: 2023-12-27

## 2023-12-27 NOTE — TELEPHONE ENCOUNTER
Sofia saw Bonnie Ho for cough cold and it has not gone away.  She would like to see a DOCTOR tomorrow for her cough.  I do not see anything available with a doctor and she does not want to see a nurse practitioner.   Please call patient to schedule an apt.     Thank you

## 2023-12-28 NOTE — TELEPHONE ENCOUNTER
Pt. Called again stating that no one returned her call and she wanted to know if they were able to get her in today. They are sending a message to  the clinical team to see if someone is willing to overbook and will call her back.

## 2023-12-29 ENCOUNTER — OFFICE VISIT (OUTPATIENT)
Dept: FAMILY MEDICINE CLINIC | Facility: CLINIC | Age: 63
End: 2023-12-29
Payer: COMMERCIAL

## 2023-12-29 VITALS
HEIGHT: 65 IN | WEIGHT: 128.8 LBS | DIASTOLIC BLOOD PRESSURE: 70 MMHG | SYSTOLIC BLOOD PRESSURE: 128 MMHG | TEMPERATURE: 98 F | BODY MASS INDEX: 21.46 KG/M2 | HEART RATE: 75 BPM | RESPIRATION RATE: 17 BRPM

## 2023-12-29 DIAGNOSIS — Z86.79 HISTORY OF PSVT (PAROXYSMAL SUPRAVENTRICULAR TACHYCARDIA): ICD-10-CM

## 2023-12-29 DIAGNOSIS — J01.00 ACUTE NON-RECURRENT MAXILLARY SINUSITIS: Primary | ICD-10-CM

## 2023-12-29 DIAGNOSIS — H69.91 DYSFUNCTION OF RIGHT EUSTACHIAN TUBE: ICD-10-CM

## 2023-12-29 PROCEDURE — 99214 OFFICE O/P EST MOD 30 MIN: CPT | Performed by: FAMILY MEDICINE

## 2023-12-29 RX ORDER — CLARITHROMYCIN 500 MG/1
500 TABLET, COATED ORAL 2 TIMES DAILY
Qty: 10 TABLET | Refills: 0 | Status: SHIPPED | OUTPATIENT
Start: 2023-12-29 | End: 2024-01-03

## 2023-12-29 NOTE — PROGRESS NOTES
Assessment/Plan:    No problem-specific Assessment & Plan notes found for this encounter.    Sinusitis suspected  Since allergic to doxycycline and pcn and recently had zithromax, will suggest extending dose of biaxin to 10 full days, rx given so she can start it  Continue otc  Discussed option in future of levaquin if needed but advised age risks and tendonitis/rupture, rhythm issues, neuropathy, dizziness...    Right otalgia, suspect ETD, continue otc, f/u if no better, TM normal appearance today    Psvt hx stable on BB prn     Diagnoses and all orders for this visit:    Acute non-recurrent maxillary sinusitis  -     clarithromycin (BIAXIN) 500 mg tablet; Take 1 tablet (500 mg total) by mouth 2 (two) times a day for 5 days    Dysfunction of right eustachian tube    History of PSVT (paroxysmal supraventricular tachycardia)         Return if symptoms worsen or fail to improve.    Subjective:      Patient ID: Sofia Akhtar is a 63 y.o. female.    Chief Complaint   Patient presents with    Cough     Lingering cough, symptoms started 2 weeks ago Kristen Roth LPN      Fatigue     Extreme fatigue       HPI  Cough  2w  Covid neg at home  Recent vertigo and seen  Never took the biaxin  Feels very tired  Nonproductive cough  No sob  No wt loss  Vertigo is better    The following portions of the patient's history were reviewed and updated as appropriate: allergies, current medications, past family history, past medical history, past social history, past surgical history and problem list.    Review of Systems   Constitutional:  Positive for fever.   Respiratory:  Positive for cough. Negative for wheezing.          Current Outpatient Medications   Medication Sig Dispense Refill    clarithromycin (BIAXIN) 500 mg tablet Take 1 tablet (500 mg total) by mouth 2 (two) times a day for 5 days 10 tablet 0    diphenhydrAMINE (BENADRYL) 25 mg tablet Take 25 mg by mouth daily at bedtime       Ibuprofen 200 MG CAPS Take 1 capsule by  "mouth daily at bedtime       metoprolol tartrate (LOPRESSOR) 25 mg tablet Take 1 tablet (25 mg total) by mouth 2 (two) times a day as needed (Rapid heartbeat) 30 tablet 5     No current facility-administered medications for this visit.       Objective:    /70   Pulse 75   Temp 98 °F (36.7 °C)   Resp 17   Ht 5' 4.5\" (1.638 m)   Wt 58.4 kg (128 lb 12.8 oz)   BMI 21.77 kg/m²        Physical Exam  Vitals and nursing note reviewed.   Constitutional:       General: She is not in acute distress.     Appearance: She is well-developed. She is not ill-appearing.   HENT:      Head: Normocephalic.      Right Ear: Tympanic membrane and ear canal normal. There is no impacted cerumen.      Left Ear: Tympanic membrane and ear canal normal. There is no impacted cerumen.      Nose: Congestion present.   Eyes:      General: No scleral icterus.     Conjunctiva/sclera: Conjunctivae normal.   Cardiovascular:      Rate and Rhythm: Normal rate and regular rhythm.      Heart sounds: No murmur heard.  Pulmonary:      Effort: Pulmonary effort is normal. No respiratory distress.      Breath sounds: No wheezing.   Abdominal:      Palpations: Abdomen is soft.   Musculoskeletal:         General: No deformity.      Cervical back: Neck supple. No rigidity.   Skin:     General: Skin is warm and dry.      Coloration: Skin is not pale.   Neurological:      Mental Status: She is alert.      Motor: No weakness.      Gait: Gait normal.   Psychiatric:         Mood and Affect: Mood normal.         Behavior: Behavior normal.         Thought Content: Thought content normal.                Dexter Khan, DO    "

## 2024-01-12 ENCOUNTER — TELEPHONE (OUTPATIENT)
Dept: CARDIOLOGY CLINIC | Facility: CLINIC | Age: 64
End: 2024-01-12

## 2024-01-12 NOTE — TELEPHONE ENCOUNTER
Patient called to report that yesterday she had an episode of tachycardia for a couple of hours. She states that her heartrate had gone up to 200BPM; she states that she had vomited, and she now feels better.     Patient denies any changes in diet - no caffeine, no alcohol.     Patient at this time feels fine.

## 2024-02-07 ENCOUNTER — OFFICE VISIT (OUTPATIENT)
Dept: CARDIOLOGY CLINIC | Facility: CLINIC | Age: 64
End: 2024-02-07
Payer: COMMERCIAL

## 2024-02-07 VITALS
HEIGHT: 65 IN | BODY MASS INDEX: 20.99 KG/M2 | SYSTOLIC BLOOD PRESSURE: 118 MMHG | OXYGEN SATURATION: 100 % | DIASTOLIC BLOOD PRESSURE: 78 MMHG | WEIGHT: 126 LBS | HEART RATE: 71 BPM

## 2024-02-07 DIAGNOSIS — E78.2 MIXED DYSLIPIDEMIA: ICD-10-CM

## 2024-02-07 DIAGNOSIS — I47.10 SVT (SUPRAVENTRICULAR TACHYCARDIA): Primary | ICD-10-CM

## 2024-02-07 DIAGNOSIS — I47.10 PSVT (PAROXYSMAL SUPRAVENTRICULAR TACHYCARDIA): ICD-10-CM

## 2024-02-07 PROCEDURE — 93000 ELECTROCARDIOGRAM COMPLETE: CPT | Performed by: INTERNAL MEDICINE

## 2024-02-07 PROCEDURE — 99214 OFFICE O/P EST MOD 30 MIN: CPT | Performed by: INTERNAL MEDICINE

## 2024-02-07 NOTE — PROGRESS NOTES
Cardiology   Xochitl Noriega DO, [facility]  Lan Bernard MD, [facility]  Ant Castaneda MD, [facility]  Ila Moran MD, [facility]  -------------------------------------------------------------------  [facility]  [address], [address], [address]  [place], [place], [address]  [phone]    Cardiology Follow Up  Sofia Akhtar  1960  [id number]          Assessment/Plan:    1. SVT (supraventricular tachycardia)    2. Mixed dyslipidemia    3. PSVT (paroxysmal supraventricular tachycardia)      -Patient with intermittent palpitations.  Increased episodes recently.  May be due to stress/anxiety.  Will repeat holter monitor and 2D echocardiogram.  - Did not use metoprolol because it causes stomach irritation.  Does not want to use medication unless absolutely necessary.  May consider verapamil.    -Her last lipid panel was reviewed.  There has been improvement in her lipid levels.  Repeat Lipid panel and CMP ordered May consider low-dose rosuvastatin if levels are not at goal.         Interval History:     Sofia Akhtar is 63 y.o. female here for followup of SVT.    She has a history of supraventricular tachycardia.  She had an episode in 2021 which brought her to the emergency room.  Stress test at that time was normal.  Since then, she has intermittent palpitations.  She had 1 sustained episode of SVT approximately 1 year ago which resolved with using metoprolol and resting.  She was seen by electrophysiologist who discussed ablation procedure with her.  She did not wish to have procedure done at that time.    She was seen last in August 2023.  At that time, she reconsidered and was willing to have ablation procedure done at [facility].  She has not seen the electrophysiologist yet.  She was started on metoprolol 12.5 mg bid last visit.   In January, she had another episode of palpitations with her home heart rate monitor showing a heart rate of 200 bpm.  It resolved after  "vomiting.  Episode lasted for 4 hours.     She had blood work done   at [facility] which showed an improvement of her cholesterol levels compared to previously documented levels.  Her total cholesterol was 170 mg/dL.         The following portions of the patient's history were reviewed and updated as appropriate: allergies, current medications, past family history, past medical history, past social history, past surgical history, and problem list.       Current Outpatient Medications:     diphenhydrAMINE (BENADRYL) 25 mg tablet, Take 25 mg by mouth daily at bedtime , Disp: , Rfl:     Ibuprofen 200 MG CAPS, Take 1 capsule by mouth daily at bedtime , Disp: , Rfl:     metoprolol tartrate (LOPRESSOR) 25 mg tablet, Take 1 tablet (25 mg total) by mouth 2 (two) times a day as needed (Rapid heartbeat), Disp: 30 tablet, Rfl: 5        Review of Systems:  Review of Systems   Respiratory:  Negative for shortness of breath.    Cardiovascular:  Positive for palpitations. Negative for chest pain and leg swelling.   Musculoskeletal:  Positive for arthralgias.   Neurological:  Positive for light-headedness. Negative for syncope.   All other systems reviewed and are negative.        Physical Exam:  Vitals:  Vitals:    02/07/24 1629   Weight: 57.2 kg (126 lb)   Height: 5' 4.5\" (1.638 m)     Physical Exam   Constitutional: She appears healthy. No distress.   Eyes: Pupils are equal, round, and reactive to light. Conjunctivae are normal.   Neck: No JVD present.   Cardiovascular: Normal rate, regular rhythm and normal heart sounds. Exam reveals no gallop and no friction rub.   No murmur heard.  Pulmonary/Chest: Effort normal and breath sounds normal. She has no wheezes. She has no rales.   Musculoskeletal:         General: No tenderness, deformity or edema.      Cervical back: Neck supple.   Neurological: She is alert and oriented to person, place, and time.   Skin: Skin is warm and dry.        Cardiographics:  EKG: Personally reviewed " normal ECG  Last known EF: 60%    This note was completed in part utilizing M-Modal Fluency Direct Software.  Grammatical errors, random word insertions, spelling mistakes, and incomplete sentences can be an occasional consequence of this system secondary to software limitations, ambient noise, and hardware issues.  If you have any questions or concerns about the content, text, or information contained within the body of this dictation, please contact the provider for clarification.

## 2024-02-21 PROBLEM — J01.00 ACUTE NON-RECURRENT MAXILLARY SINUSITIS: Status: RESOLVED | Noted: 2023-12-29 | Resolved: 2024-02-21

## 2024-03-08 ENCOUNTER — HOSPITAL ENCOUNTER (OUTPATIENT)
Dept: NON INVASIVE DIAGNOSTICS | Facility: HOSPITAL | Age: 64
Discharge: HOME | End: 2024-03-08
Attending: INTERNAL MEDICINE
Payer: COMMERCIAL

## 2024-03-08 VITALS
HEART RATE: 78 BPM | BODY MASS INDEX: 21.51 KG/M2 | WEIGHT: 126 LBS | SYSTOLIC BLOOD PRESSURE: 128 MMHG | HEIGHT: 64 IN | DIASTOLIC BLOOD PRESSURE: 79 MMHG

## 2024-03-08 DIAGNOSIS — I47.10 SVT (SUPRAVENTRICULAR TACHYCARDIA): ICD-10-CM

## 2024-03-08 LAB
AORTIC ROOT: 3 CM
APICAL FOUR CHAMBER EJECTION FRACTION: 65 %
BSA FOR ECHO PROCEDURE: 1.61 M2
E WAVE DECELERATION TIME: 138 MS
E/A RATIO: 0.94
FRACTIONAL SHORTENING: 42 (ref 28–44)
INTERVENTRICULAR SEPTUM IN DIASTOLE (PARASTERNAL SHORT AXIS VIEW): 0.7 CM
INTERVENTRICULAR SEPTUM: 0.7 CM (ref 0.6–1.1)
LAAS-AP2: 14.3 CM2
LAAS-AP4: 18.3 CM2
LEFT ATRIUM SIZE: 2.4 CM
LEFT ATRIUM VOLUME (MOD BIPLANE): 47 ML
LEFT ATRIUM VOLUME INDEX (MOD BIPLANE): 29 ML/M2
LEFT INTERNAL DIMENSION IN SYSTOLE: 2.1 CM (ref 2.1–4)
LEFT VENTRICULAR INTERNAL DIMENSION IN DIASTOLE: 3.6 CM (ref 3.5–6)
LEFT VENTRICULAR POSTERIOR WALL IN END DIASTOLE: 0.8 CM
LEFT VENTRICULAR STROKE VOLUME: 41 ML
LVSV (TEICH): 41 ML
MV E'TISSUE VEL-LAT: 9 CM/S
MV E'TISSUE VEL-SEP: 8 CM/S
MV PEAK A VEL: 0.67 M/S
MV PEAK E VEL: 63 CM/S
MV STENOSIS PRESSURE HALF TIME: 40 MS
MV VALVE AREA P 1/2 METHOD: 5.5
RIGHT ATRIUM AREA SYSTOLE A4C: 10.9 CM2
RIGHT VENTRICLE ID DIMENSION: 2.2 CM
SL CV LEFT ATRIUM LENGTH A2C: 4.3 CM
SL CV PED ECHO LEFT VENTRICLE DIASTOLIC VOLUME (MOD BIPLANE) 2D: 54 ML
SL CV PED ECHO LEFT VENTRICLE SYSTOLIC VOLUME (MOD BIPLANE) 2D: 14 ML
TR MAX PG: 16 MMHG
TR PEAK VELOCITY: 2 M/S
TRICUSPID ANNULAR PLANE SYSTOLIC EXCURSION: 1.7 CM
TRICUSPID VALVE PEAK REGURGITATION VELOCITY: 2 M/S

## 2024-03-08 PROCEDURE — 93306 TTE W/DOPPLER COMPLETE: CPT

## 2024-03-08 PROCEDURE — 93306 TTE W/DOPPLER COMPLETE: CPT | Performed by: INTERNAL MEDICINE

## 2024-03-08 PROCEDURE — 93225 XTRNL ECG REC<48 HRS REC: CPT

## 2024-03-08 PROCEDURE — 93226 XTRNL ECG REC<48 HR SCAN A/R: CPT

## 2024-03-11 ENCOUNTER — TELEPHONE (OUTPATIENT)
Dept: CARDIOLOGY CLINIC | Facility: CLINIC | Age: 64
End: 2024-03-11

## 2024-03-11 NOTE — TELEPHONE ENCOUNTER
----- Message from Xochitl Noriega DO sent at 3/8/2024  5:45 PM EST -----  Can you please let the patient know echo was normal

## 2024-03-15 PROCEDURE — 93227 XTRNL ECG REC<48 HR R&I: CPT | Performed by: INTERNAL MEDICINE

## 2024-03-18 ENCOUNTER — TELEPHONE (OUTPATIENT)
Dept: CARDIOLOGY CLINIC | Facility: CLINIC | Age: 64
End: 2024-03-18

## 2024-03-18 NOTE — TELEPHONE ENCOUNTER
----- Message from Xochitl Noriega DO sent at 3/15/2024  5:02 PM EDT -----  Can you please let the patient know holter was normal

## 2024-04-16 ENCOUNTER — TELEPHONE (OUTPATIENT)
Age: 64
End: 2024-04-16

## 2024-04-16 NOTE — TELEPHONE ENCOUNTER
Caller: PATIENT    Doctor: Francis    Reason for call:     Patient could not coordinate with schedule will call back.    Call back#: n/a

## 2024-04-20 LAB
ALBUMIN SERPL-MCNC: 4.4 G/DL (ref 3.9–4.9)
ALBUMIN/GLOB SERPL: 2.3 {RATIO} (ref 1.2–2.2)
ALP SERPL-CCNC: 101 IU/L (ref 44–121)
ALT SERPL-CCNC: 22 IU/L (ref 0–32)
AST SERPL-CCNC: 26 IU/L (ref 0–40)
BILIRUB SERPL-MCNC: 0.3 MG/DL (ref 0–1.2)
BUN SERPL-MCNC: 16 MG/DL (ref 8–27)
BUN/CREAT SERPL: 23 (ref 12–28)
CALCIUM SERPL-MCNC: 8.9 MG/DL (ref 8.7–10.3)
CHLORIDE SERPL-SCNC: 102 MMOL/L (ref 96–106)
CHOLEST SERPL-MCNC: 287 MG/DL (ref 100–199)
CO2 SERPL-SCNC: 26 MMOL/L (ref 20–29)
CREAT SERPL-MCNC: 0.69 MG/DL (ref 0.57–1)
CYTOLOGY CMNT CVX/VAG CYTO-IMP: ABNORMAL
EGFR: 98 ML/MIN/1.73
GLOBULIN SER-MCNC: 1.9 G/DL (ref 1.5–4.5)
GLUCOSE SERPL-MCNC: 89 MG/DL (ref 70–99)
HDLC SERPL-MCNC: 62 MG/DL
LDLC SERPL CALC-MCNC: 209 MG/DL (ref 0–99)
POTASSIUM SERPL-SCNC: 4.1 MMOL/L (ref 3.5–5.2)
PROT SERPL-MCNC: 6.3 G/DL (ref 6–8.5)
SL AMB VLDL CHOLESTEROL CALC: 16 MG/DL (ref 5–40)
SODIUM SERPL-SCNC: 141 MMOL/L (ref 134–144)
TRIGL SERPL-MCNC: 95 MG/DL (ref 0–149)

## 2024-04-22 ENCOUNTER — TELEPHONE (OUTPATIENT)
Dept: CARDIOLOGY CLINIC | Facility: CLINIC | Age: 64
End: 2024-04-22

## 2024-04-22 DIAGNOSIS — E78.2 MIXED DYSLIPIDEMIA: Primary | ICD-10-CM

## 2024-04-22 NOTE — TELEPHONE ENCOUNTER
Pt.made aware of lab results. Pt.would like to repeat lipid panel as does not understand how much worse her labs have gotten with diet and exercise. Even lost 5 lbs. Pt.is wondering if this could be a discrepancy with [facility] as had problems with them.

## 2024-04-22 NOTE — TELEPHONE ENCOUNTER
----- Message from Xochitl Noriega DO sent at 4/22/2024  9:52 AM EDT -----  Can you please let the patient know cholesterol levels have gone up considerable.  LDL is 207.    Would like to start her on medications right away.  I will send it in if okay with her

## 2024-05-16 LAB
CHOLEST SERPL-MCNC: 260 MG/DL (ref 100–199)
HDLC SERPL-MCNC: 55 MG/DL
LDLC SERPL CALC-MCNC: 183 MG/DL (ref 0–99)
SL AMB VLDL CHOLESTEROL CALC: 22 MG/DL (ref 5–40)
TRIGL SERPL-MCNC: 123 MG/DL (ref 0–149)

## 2024-05-20 ENCOUNTER — TELEPHONE (OUTPATIENT)
Dept: CARDIOLOGY CLINIC | Facility: CLINIC | Age: 64
End: 2024-05-20

## 2024-05-20 NOTE — TELEPHONE ENCOUNTER
----- Message from Xochitl Noriega DO sent at 5/17/2024  4:20 PM EDT -----  Can you please let the patient know repeat lipid panel did show better numbers but still very high     Would encourage her to use cholesterol medication as we discussed.

## 2024-05-29 NOTE — TELEPHONE ENCOUNTER
Caller: Sofia Akhtar    Doctor: Leann    Reason for call: Pt has not yet received a call to go over the questions regarding cholesterol meds & their side effects.  Please reach out to patient at earliest convenience.  Thank you.    Call back#: [phone]

## 2024-06-02 PROBLEM — R14.0 ABDOMINAL BLOATING: Status: RESOLVED | Noted: 2021-10-21 | Resolved: 2024-06-02

## 2024-06-02 PROBLEM — D70.9 NEUTROPENIA (HCC): Status: RESOLVED | Noted: 2021-11-29 | Resolved: 2024-06-02

## 2024-06-05 ENCOUNTER — OFFICE VISIT (OUTPATIENT)
Dept: FAMILY MEDICINE CLINIC | Facility: CLINIC | Age: 64
End: 2024-06-05
Payer: COMMERCIAL

## 2024-06-05 VITALS
RESPIRATION RATE: 16 BRPM | SYSTOLIC BLOOD PRESSURE: 142 MMHG | WEIGHT: 118 LBS | TEMPERATURE: 97.9 F | HEIGHT: 64 IN | BODY MASS INDEX: 20.14 KG/M2 | HEART RATE: 72 BPM | DIASTOLIC BLOOD PRESSURE: 80 MMHG

## 2024-06-05 DIAGNOSIS — E78.2 MIXED DYSLIPIDEMIA: Primary | ICD-10-CM

## 2024-06-05 DIAGNOSIS — R20.2 PARESTHESIA OF RIGHT FOOT: Primary | ICD-10-CM

## 2024-06-05 DIAGNOSIS — M25.671 ANKLE STIFF, RIGHT: ICD-10-CM

## 2024-06-05 DIAGNOSIS — I47.10 PSVT (PAROXYSMAL SUPRAVENTRICULAR TACHYCARDIA): ICD-10-CM

## 2024-06-05 PROCEDURE — 99214 OFFICE O/P EST MOD 30 MIN: CPT | Performed by: FAMILY MEDICINE

## 2024-06-05 RX ORDER — ATORVASTATIN CALCIUM 10 MG/1
10 TABLET, FILM COATED ORAL DAILY
Qty: 90 TABLET | Refills: 1 | Status: SHIPPED | OUTPATIENT
Start: 2024-06-05

## 2024-06-05 NOTE — PROGRESS NOTES
"Assessment/Plan:    No problem-specific Assessment & Plan notes found for this encounter.    Suspect tarsal tunnel syndrome  NCT ordered  Podiatry referral   PT for ankle stiffness    Doubt PAD    PSVT stable on BB    HLD on statin, lipitor 10mg  Last      Diagnoses and all orders for this visit:    Paresthesia of right foot  -     Ambulatory referral to Podiatry; Future  -     Nerve conduction test; Future    Ankle stiff, right  -     Ambulatory referral to Physical Therapy; Future        No follow-ups on file.    Subjective:      Patient ID: Sofia Akhtar is a 63 y.o. female.    Chief Complaint   Patient presents with    Tingling     Right foot; ongoing for a few months   YC       HPI  Right foot mostly, a little on left  Notes when laying down  Pins and needles but mild  3 months  Not noted in daytime  Does exercise  Up to 1 mi per day  No need to stop walking  No color changes  No better with moving around  Not above ankle  Inner and bottom area    The following portions of the patient's history were reviewed and updated as appropriate: allergies, current medications, past family history, past medical history, past social history, past surgical history and problem list.    Review of Systems   Constitutional:  Negative for chills and fever.         Current Outpatient Medications   Medication Sig Dispense Refill    diphenhydrAMINE (BENADRYL) 25 mg tablet Take 25 mg by mouth daily at bedtime       Ibuprofen 200 MG CAPS Take 1 capsule by mouth daily at bedtime       atorvastatin (LIPITOR) 10 mg tablet Take 1 tablet (10 mg total) by mouth daily 90 tablet 1    metoprolol tartrate (LOPRESSOR) 25 mg tablet Take 1 tablet (25 mg total) by mouth 2 (two) times a day as needed (Rapid heartbeat) 30 tablet 5     No current facility-administered medications for this visit.       Objective:    /80   Pulse 72   Temp 97.9 °F (36.6 °C) (Tympanic)   Resp 16   Ht 5' 4\" (1.626 m)   Wt 53.5 kg (118 lb)   BMI 20.25 kg/m² "        Physical Exam  Vitals and nursing note reviewed.   Constitutional:       General: She is not in acute distress.     Appearance: She is well-developed. She is not ill-appearing.   HENT:      Head: Normocephalic.      Right Ear: Tympanic membrane normal.      Left Ear: Tympanic membrane normal.   Eyes:      Conjunctiva/sclera: Conjunctivae normal.   Cardiovascular:      Rate and Rhythm: Normal rate and regular rhythm.      Pulses: Normal pulses.   Pulmonary:      Effort: Pulmonary effort is normal. No respiratory distress.      Breath sounds: No wheezing.   Abdominal:      Palpations: Abdomen is soft.   Musculoskeletal:         General: No deformity.      Cervical back: Neck supple.      Right lower leg: No edema.      Left lower leg: No edema.   Skin:     General: Skin is warm and dry.      Coloration: Skin is not pale.   Neurological:      Mental Status: She is alert.      Motor: No weakness.      Gait: Gait normal.      Comments: No foot drop or tibialis anterior weakness b/l  Heel and toe raise wnl   Psychiatric:         Mood and Affect: Mood normal.         Behavior: Behavior normal.         Thought Content: Thought content normal.                Dexter Khan DO

## 2024-07-19 ENCOUNTER — NURSE TRIAGE (OUTPATIENT)
Dept: OTHER | Facility: OTHER | Age: 64
End: 2024-07-19

## 2024-07-20 NOTE — TELEPHONE ENCOUNTER
"Reason for Disposition  • [1] Heart beating very rapidly (e.g., > 140 / minute) AND [2] present now  (Exception: during exercise)    Answer Assessment - Initial Assessment Questions  1. DESCRIPTION: \"Please describe your heart rate or heartbeat that you are having\" (e.g., fast/slow, regular/irregular, skipped or extra beats, \"palpitations\")      Rapid heart rate around the 195's   2. ONSET: \"When did it start?\" (Minutes, hours or days)       845 PM   3. DURATION: \"How long does it last\" (e.g., seconds, minutes, hours)    Constant since 2045  4. PATTERN \"Does it come and go, or has it been constant since it started?\"  \"Does it get worse with exertion?\"   \"Are you feeling it now?\"      HR staying around 195    5. TAP: \"Using your hand, can you tap out what you are feeling on a chair or table in front of you, so that I can hear?\" (Note: not all patients can do this)        N/a  6. HEART RATE: \"Can you tell me your heart rate?\" \"How many beats in 15 seconds?\"  (Note: not all patients can do this)      195  7. RECURRENT SYMPTOM: \"Have you ever had this before?\" If Yes, ask: \"When was the last time?\" and \"What happened that time?\"       Confirms PSVT  8. CAUSE: \"What do you think is causing the palpitations?\"   Denies   9. CARDIAC HISTORY: \"Do you have any history of heart disease?\" (e.g., heart attack, angina, bypass surgery, angioplasty, arrhythmia)       PSVT.  Bearing down attempted .   10. OTHER SYMPTOMS: \"Do you have any other symptoms?\" (e.g., dizziness, chest pain, sweating, difficulty breathing)        Denies       Metroprolol 5 mg  45 minutes ago    Protocols used: Heart Rate and Heartbeat Questions-ADULT-AH    "

## 2024-07-20 NOTE — TELEPHONE ENCOUNTER
"Regarding: tachycardia episodes  ----- Message from Juan ALVARES sent at 7/19/2024  9:26 PM EDT -----  \"Sofia is currently having tachycardia episodes. She took a Metoprolol  45 mins ago. Can she take another one?\"    "

## 2024-07-20 NOTE — TELEPHONE ENCOUNTER
Patient of Dr. Nroiega. Diagnosis of SVT. HR tonight at 195 since 2045. Metoprolol 25 MG taken at 2055, ineffective. Denies any symptoms, alert and oriented. Metoprolol 25 MG BID PRN for rapid HR, Took only one so far. Patient wants to take another 25 mg.       On call provider contacted and informed of patient's concerns and status.    Provider advises as follow: Ok to take additional 25 mg of Metoprolol, however if no improvement, proceed to ED.     Informed of provider's recommendation, along with care advice. Verbalized understanding. Agreeable with disposition. No further questions.

## 2024-07-23 ENCOUNTER — OFFICE VISIT (OUTPATIENT)
Dept: CARDIOLOGY CLINIC | Facility: CLINIC | Age: 64
End: 2024-07-23
Payer: COMMERCIAL

## 2024-07-23 VITALS
HEART RATE: 63 BPM | SYSTOLIC BLOOD PRESSURE: 112 MMHG | OXYGEN SATURATION: 99 % | HEIGHT: 64 IN | WEIGHT: 118 LBS | BODY MASS INDEX: 20.14 KG/M2 | DIASTOLIC BLOOD PRESSURE: 82 MMHG

## 2024-07-23 DIAGNOSIS — E78.2 MIXED DYSLIPIDEMIA: ICD-10-CM

## 2024-07-23 DIAGNOSIS — I47.10 PSVT (PAROXYSMAL SUPRAVENTRICULAR TACHYCARDIA): Primary | ICD-10-CM

## 2024-07-23 PROCEDURE — 99213 OFFICE O/P EST LOW 20 MIN: CPT | Performed by: INTERNAL MEDICINE

## 2024-07-23 PROCEDURE — 93000 ELECTROCARDIOGRAM COMPLETE: CPT | Performed by: INTERNAL MEDICINE

## 2024-07-23 NOTE — PROGRESS NOTES
Cardiology   Xochitl Noriega DO, [facility]  Lan Bernard MD, [facility]  Ant Castaneda MD, [facility]  Ila Moran MD, [facility]  -------------------------------------------------------------------  [facility]  [address], [address], [address]  [place], [place], [address]  [phone]    Cardiology Follow Up  Sofia Akhtar  1960  [id number]          Assessment/Plan:    1. PSVT (paroxysmal supraventricular tachycardia)    2. Mixed dyslipidemia      -Patient with intermittent palpitations.  Increased episodes recently.   SVT episode terminated with Valsalva and metoprolol.  -Discussed with her in detail.  She will use metoprolol as needed as she does not wish to take it daily.  -She has elevated lipid levels.  Will start low-dose atorvastatin next week.         Interval History:     Sofia Akhtar is 63 y.o. female here for followup of SVT.  Patient had an episode of SVT on Saturday night that did not resolve with Valsalva.  She used metoprolol * 2 with relief.  No further episodes since then.  She has been anxious.  Will be traveling later this week.  Was also prescribed atorvastatin which she plans on starting after returning from trip.       She has a history of supraventricular tachycardia.  She had an episode in 2021 which brought her to the emergency room.  Stress test at that time was normal.  Since then, she has intermittent palpitations.  She had 1 sustained episode of SVT approximately 1 year ago which resolved with using metoprolol and resting.  She was seen by electrophysiologist who discussed ablation procedure with her.  She did not wish to have procedure done at that time.    She was seen last in August 2023.  At that time, she reconsidered and was willing to have ablation procedure done at [facility].  She has not seen the electrophysiologist yet.  She was started on metoprolol 12.5 mg bid last visit.   In January, she had another episode of palpitations  "with her home heart rate monitor showing a heart rate of 200 bpm.  It resolved after vomiting.  Episode lasted for 4 hours.     She had blood work done   at [facility] which showed an improvement of her cholesterol levels compared to previously documented levels.  Her total cholesterol was 170 mg/dL.         The following portions of the patient's history were reviewed and updated as appropriate: allergies, current medications, past family history, past medical history, past social history, past surgical history, and problem list.       Current Outpatient Medications:     atorvastatin (LIPITOR) 10 mg tablet, Take 1 tablet (10 mg total) by mouth daily, Disp: 90 tablet, Rfl: 1    diphenhydrAMINE (BENADRYL) 25 mg tablet, Take 25 mg by mouth daily at bedtime , Disp: , Rfl:     Ibuprofen 200 MG CAPS, Take 1 capsule by mouth daily at bedtime , Disp: , Rfl:     metoprolol tartrate (LOPRESSOR) 25 mg tablet, Take 1 tablet (25 mg total) by mouth 2 (two) times a day as needed (Rapid heartbeat), Disp: 30 tablet, Rfl: 5        Review of Systems:  Review of Systems   Respiratory:  Negative for shortness of breath.    Cardiovascular:  Positive for palpitations. Negative for chest pain and leg swelling.   All other systems reviewed and are negative.        Physical Exam:  Vitals:  Vitals:    07/23/24 1248   BP: 112/82   BP Location: Right arm   Patient Position: Sitting   Cuff Size: Standard   Pulse: 63   SpO2: 99%   Weight: 53.5 kg (118 lb)   Height: 5' 4\" (1.626 m)     Physical Exam   Constitutional: She appears healthy. No distress.   Musculoskeletal:         General: No edema.   Neurological: She is alert and oriented to person, place, and time.   Skin: No rash noted. No cyanosis. No jaundice or pallor.        Cardiographics:  EKG: Personally reviewed normal ECG  Last known EF: 60%    This note was completed in part utilizing [employer] Direct Software.  Grammatical errors, random word insertions, spelling mistakes, and " incomplete sentences can be an occasional consequence of this system secondary to software limitations, ambient noise, and hardware issues.  If you have any questions or concerns about the content, text, or information contained within the body of this dictation, please contact the provider for clarification.

## 2024-09-13 ENCOUNTER — PATIENT MESSAGE (OUTPATIENT)
Dept: FAMILY MEDICINE CLINIC | Facility: CLINIC | Age: 64
End: 2024-09-13

## 2024-09-13 DIAGNOSIS — Z12.31 ENCOUNTER FOR SCREENING MAMMOGRAM FOR MALIGNANT NEOPLASM OF BREAST: Primary | ICD-10-CM

## 2024-09-18 DIAGNOSIS — Z12.39 SCREENING BREAST EXAMINATION: Primary | ICD-10-CM

## 2024-11-18 DIAGNOSIS — R93.1 ELEVATED CORONARY ARTERY CALCIUM SCORE: ICD-10-CM

## 2024-11-18 DIAGNOSIS — E78.2 MIXED DYSLIPIDEMIA: Primary | ICD-10-CM

## 2024-11-19 ENCOUNTER — TELEPHONE (OUTPATIENT)
Age: 64
End: 2024-11-19

## 2024-11-19 NOTE — TELEPHONE ENCOUNTER
Sonja from [facility] [phone] contacting office requesting last o/v note, any recent cardiac testing and most recent EKG w/Tracings to be faxed to the following fax number:    [phone]

## 2024-11-23 DIAGNOSIS — E78.2 MIXED DYSLIPIDEMIA: ICD-10-CM

## 2024-11-25 RX ORDER — ATORVASTATIN CALCIUM 10 MG/1
TABLET, FILM COATED ORAL
Qty: 90 TABLET | Refills: 1 | Status: SHIPPED | OUTPATIENT
Start: 2024-11-25

## 2024-11-26 DIAGNOSIS — Z12.39 SCREENING BREAST EXAMINATION: ICD-10-CM

## 2024-11-27 ENCOUNTER — RESULTS FOLLOW-UP (OUTPATIENT)
Dept: FAMILY MEDICINE CLINIC | Facility: CLINIC | Age: 64
End: 2024-11-27

## 2024-12-14 LAB
ALBUMIN SERPL-MCNC: 4.3 G/DL (ref 3.9–4.9)
ALP SERPL-CCNC: 113 IU/L (ref 44–121)
ALT SERPL-CCNC: 24 IU/L (ref 0–32)
AST SERPL-CCNC: 22 IU/L (ref 0–40)
BILIRUB SERPL-MCNC: 0.2 MG/DL (ref 0–1.2)
BUN SERPL-MCNC: 18 MG/DL (ref 8–27)
BUN/CREAT SERPL: 27 (ref 12–28)
CALCIUM SERPL-MCNC: 8.8 MG/DL (ref 8.7–10.3)
CHLORIDE SERPL-SCNC: 105 MMOL/L (ref 96–106)
CHOLEST SERPL-MCNC: 207 MG/DL (ref 100–199)
CO2 SERPL-SCNC: 26 MMOL/L (ref 20–29)
CREAT SERPL-MCNC: 0.66 MG/DL (ref 0.57–1)
EGFR: 99 ML/MIN/1.73
GLOBULIN SER-MCNC: 1.8 G/DL (ref 1.5–4.5)
GLUCOSE SERPL-MCNC: 80 MG/DL (ref 70–99)
HDLC SERPL-MCNC: 65 MG/DL
LDLC SERPL CALC-MCNC: 128 MG/DL (ref 0–99)
POTASSIUM SERPL-SCNC: 4.6 MMOL/L (ref 3.5–5.2)
PROT SERPL-MCNC: 6.1 G/DL (ref 6–8.5)
SL AMB VLDL CHOLESTEROL CALC: 14 MG/DL (ref 5–40)
SODIUM SERPL-SCNC: 142 MMOL/L (ref 134–144)
TRIGL SERPL-MCNC: 76 MG/DL (ref 0–149)

## 2024-12-16 ENCOUNTER — RESULTS FOLLOW-UP (OUTPATIENT)
Dept: CARDIOLOGY CLINIC | Facility: CLINIC | Age: 64
End: 2024-12-16

## 2024-12-16 NOTE — TELEPHONE ENCOUNTER
----- Message from Xochitl Noriega DO sent at 12/16/2024  9:37 AM EST -----  Can you please let the patient know cholesterol levels have improved by a lot but still high.  Would like to bump the atorvastatin to 20 mg daily. (Still on the lower side of dose, highest is 80 mg)

## 2024-12-16 NOTE — TELEPHONE ENCOUNTER
Spoke with patient. Pt.stated, she's not sure that she even wants to increase the medication due to it's side effects. She will think about it and discuss with Dr. Noriega at next office visit.

## 2025-01-28 ENCOUNTER — OFFICE VISIT (OUTPATIENT)
Dept: CARDIOLOGY CLINIC | Facility: CLINIC | Age: 65
End: 2025-01-28
Payer: COMMERCIAL

## 2025-01-28 VITALS
HEART RATE: 76 BPM | DIASTOLIC BLOOD PRESSURE: 82 MMHG | OXYGEN SATURATION: 99 % | SYSTOLIC BLOOD PRESSURE: 110 MMHG | HEIGHT: 64 IN | WEIGHT: 125 LBS | BODY MASS INDEX: 21.34 KG/M2

## 2025-01-28 DIAGNOSIS — I47.10 PSVT (PAROXYSMAL SUPRAVENTRICULAR TACHYCARDIA) (HCC): ICD-10-CM

## 2025-01-28 DIAGNOSIS — E78.2 MIXED DYSLIPIDEMIA: Primary | ICD-10-CM

## 2025-01-28 PROCEDURE — 93000 ELECTROCARDIOGRAM COMPLETE: CPT | Performed by: INTERNAL MEDICINE

## 2025-01-28 PROCEDURE — 99213 OFFICE O/P EST LOW 20 MIN: CPT | Performed by: INTERNAL MEDICINE

## 2025-01-28 RX ORDER — ATORVASTATIN CALCIUM 10 MG/1
10 TABLET, FILM COATED ORAL DAILY
Qty: 90 TABLET | Refills: 3 | Status: SHIPPED | OUTPATIENT
Start: 2025-01-28

## 2025-01-28 RX ORDER — METOPROLOL TARTRATE 25 MG/1
25 TABLET, FILM COATED ORAL 2 TIMES DAILY PRN
Qty: 30 TABLET | Refills: 5 | Status: SHIPPED | OUTPATIENT
Start: 2025-01-28

## 2025-01-28 NOTE — PROGRESS NOTES
Cardiology   Xochitl Noriega DO, [facility]  Lan Bernard MD, [facility]  Ant Castaneda MD, [facility]  Ila Moran MD, [facility]  -------------------------------------------------------------------  [facility]  [address], [address], [address]  [place], [place], [address]  [phone]    Cardiology Follow Up  Sofia Akhtar  1960  [id number]          Assessment/Plan:    Assessment & Plan  Mixed dyslipidemia    PSVT (paroxysmal supraventricular tachycardia) (HCC)        -Patient with intermittent palpitations.  Increased episodes recently.   SVT episode terminated with Valsalva and metoprolol.  -Discussed with her in detail.  She will use metoprolol as needed as she does not wish to take it daily.  -She has elevated lipid levels but improved from previous.  Calcium score was minimally elevated.  Continue atorvastatin 10 mg daily.           Interval History:     Sofia Akhtar is 64 y.o. female here for followup of SVT and hyperlipidemia.      She has occasional episodes of palpitations but is overall feeling well.  Denies any chest pain or shortness of breath.    She uses metoprolol as needed.  She was seen by EP in [place] who discussed options for SVT management.  She does not wish to have ablation procedure at this time.     She had blood work done on December 13, 2024 which showed an LDL of 128 and total cholesterol of 207.  She did not wish to increase atorvastatin dose.    She has a history of supraventricular tachycardia.  She had an episode in 2021 which brought her to the emergency room.  Stress test at that time was normal.  Since then, she has intermittent palpitations.  She had 1 sustained episode of SVT approximately 1 year ago which resolved with using metoprolol and resting.   She was started on metoprolol 12.5 mg bid but did not wish to take regularly.    In January 2024, she had another episode of palpitations with her home heart rate monitor showing a heart rate  "of 200 bpm.  It resolved after vomiting.  Episode lasted for 4 hours.             The following portions of the patient's history were reviewed and updated as appropriate: allergies, current medications, past family history, past medical history, past social history, past surgical history, and problem list.       Current Outpatient Medications:     atorvastatin (LIPITOR) 10 mg tablet, Take 1 tablet (10 mg total) by mouth daily, Disp: 90 tablet, Rfl: 3    diphenhydrAMINE (BENADRYL) 25 mg tablet, Take 25 mg by mouth daily at bedtime , Disp: , Rfl:     Ibuprofen 200 MG CAPS, Take 1 capsule by mouth daily at bedtime , Disp: , Rfl:     metoprolol tartrate (LOPRESSOR) 25 mg tablet, Take 1 tablet (25 mg total) by mouth 2 (two) times a day as needed (Rapid heartbeat), Disp: 30 tablet, Rfl: 5        Review of Systems:  Review of Systems   Respiratory:  Negative for shortness of breath.    Cardiovascular:  Positive for palpitations. Negative for chest pain and leg swelling.   All other systems reviewed and are negative.        Physical Exam:  Vitals:  Vitals:    01/28/25 1132   BP: 110/82   BP Location: Right arm   Patient Position: Sitting   Cuff Size: Standard   Pulse: 76   SpO2: 99%   Weight: 56.7 kg (125 lb)   Height: 5' 4\" (1.626 m)     Physical Exam   Constitutional: She appears healthy. No distress.   Musculoskeletal:         General: No edema.   Neurological: She is alert and oriented to person, place, and time.   Skin: No rash noted. No cyanosis. No jaundice or pallor.        Cardiographics:  EKG: Personally reviewed normal ECG  Last known EF: 60%    This note was completed in part utilizing [employer] Software.  Grammatical errors, random word insertions, spelling mistakes, and incomplete sentences can be an occasional consequence of this system secondary to software limitations, ambient noise, and hardware issues.  If you have any questions or concerns about the content, text, or information contained " within the body of this dictation, please contact the provider for clarification.

## 2025-03-21 ENCOUNTER — TELEPHONE (OUTPATIENT)
Age: 65
End: 2025-03-21

## 2025-03-21 ENCOUNTER — CONSULT (OUTPATIENT)
Dept: PAIN MEDICINE | Facility: CLINIC | Age: 65
End: 2025-03-21
Payer: COMMERCIAL

## 2025-03-21 ENCOUNTER — APPOINTMENT (OUTPATIENT)
Dept: RADIOLOGY | Facility: CLINIC | Age: 65
End: 2025-03-21
Payer: COMMERCIAL

## 2025-03-21 DIAGNOSIS — M54.2 NECK PAIN: ICD-10-CM

## 2025-03-21 DIAGNOSIS — M79.18 MYOFASCIAL PAIN SYNDROME: ICD-10-CM

## 2025-03-21 DIAGNOSIS — M67.971 DISORDER OF RIGHT ACHILLES TENDON: ICD-10-CM

## 2025-03-21 DIAGNOSIS — M47.812 CERVICAL SPONDYLOSIS: Primary | ICD-10-CM

## 2025-03-21 PROCEDURE — 72050 X-RAY EXAM NECK SPINE 4/5VWS: CPT

## 2025-03-21 PROCEDURE — 99204 OFFICE O/P NEW MOD 45 MIN: CPT | Performed by: STUDENT IN AN ORGANIZED HEALTH CARE EDUCATION/TRAINING PROGRAM

## 2025-03-21 RX ORDER — DICLOFENAC SODIUM 75 MG/1
75 TABLET, DELAYED RELEASE ORAL 2 TIMES DAILY
Qty: 60 TABLET | Refills: 1 | Status: SHIPPED | OUTPATIENT
Start: 2025-03-21

## 2025-03-21 NOTE — TELEPHONE ENCOUNTER
S/w pt and advised diclofenac is an NSAID that may work better than advil and be gentler on her stomach.  Pt hesitant to take it stating cardiac side effects and her hx of SVT  Advised all NSAIDs carry same risk and provider is aware of cardiac hx as it is documented in chart  .  Pt asking if risk is greater with taking diclofenac over advil?  Advised will forward concern to provider

## 2025-03-21 NOTE — TELEPHONE ENCOUNTER
Caller: pt    Doctor: Dr. robertson    Reason for call: pt thought she was getting prescription strength advil and would like to get that instead of the diclofenac unless there is a reason for the diclofenac?    Call back#: [phone]

## 2025-03-21 NOTE — PROGRESS NOTES
Assessment:  1. Cervical spondylosis    2. Disorder of right Achilles tendon    3. Myofascial pain syndrome        Plan:  Orders Placed This Encounter   Procedures    X-ray cervical spine complete 4 or 5 vw     Standing Status:   Future     Number of Occurrences:   1     Expected Date:   3/21/2025     Expiration Date:   3/21/2029     Scheduling Instructions:      Bring along any outside films relating to this procedure.          Ambulatory referral to Physical Therapy     Standing Status:   Future     Expiration Date:   3/21/2026     Referral Priority:   Routine     Referral Type:   Physical Therapy     Referral Reason:   Specialty Services Required     Requested Specialty:   Physical Therapy     Number of Visits Requested:   1     Expiration Date:   3/21/2026       New Medications Ordered This Visit   Medications    diclofenac (VOLTAREN) 75 mg EC tablet     Sig: Take 1 tablet (75 mg total) by mouth 2 (two) times a day     Dispense:  60 tablet     Refill:  1       My impressions and treatment recommendations were discussed in detail with the patient, who verbalized understanding and had no further questions.    And is a very pleasant 64-year-old female who presents today for evaluation management of left-sided neck pain.  I independently interpreted the patient's cervical radiographs showing degenerative changes with disc height loss at C4-5 and C5-6.  The patient's pain appears to be mechanical and facet mediated with a myofascial component.  We discussed management including activity modification, physical therapy for cervical and scapular stabilization, multimodal analgesics, consideration of injection therapy.    I provided the patient a referral to physical therapy to work on cervical and scapular strengthening and stabilization.  She is also interested in being evaluated for her gait and biomechanics to see if any asymmetry is contributing to her pain.    I have also provided her a prescription for diclofenac  to be taken in favor of other NSAIDs.  I will have her follow-up in 6 weeks to see how she is progressing.  If failure to improve, we can consider further imaging versus trigger point injections.    Follow-up is planned in 6 weeks time or sooner as warranted.  Discharge instructions were provided. I personally saw and examined the patient and I agree with the above discussed plan of care.    I have spent a total time of 45 minutes in caring for this patient on the day of the visit/encounter including Diagnostic results, Prognosis, Risks and benefits of tx options, Instructions for management, Impressions, Documenting in the medical record, Reviewing/placing orders in the medical record (including tests, medications, and/or procedures), and Obtaining or reviewing history  .      History of Present Illness:    Sofia Akhtar is a 64 y.o. female who presents to [facility] for initial evaluation of the above stated pain complaints. The patient has a past medical and chronic pain history as outlined in the assessment section. She was referred by Referral Self  No address on file.    64-year-old female with past medical history of anxiety, hyperlipidemia presents today for evaluation management of neck and head pain.  The pain is been present for over 10 years.  She denies any specific inciting event.  The pain is rated as moderate and is currently 6-7 out of 10.  Her pain is constant without a typical pattern.  The pain is associated with burning, shooting, pressure, and aching.  The pain is located in the base of the left side of the neck radiating into the shoulder blade and up into the back of the head.  The pain is not made better or worse with any particular activities.  She denies having any injections or surgeries.  She has not done any recent physical therapy however in 2018 she had which was not helpful to her.  She is currently taking ibuprofen.  She also reports having imbalance with  walking in her left Achilles which is led to positional imbalance which she thinks is attributed to her neck pain. She presents today for further evaluation.    Review of Systems:    Review of Systems   Constitutional:  Negative for unexpected weight change.   HENT:  Positive for hearing loss and nosebleeds. Negative for ear pain.    Eyes:  Negative for visual disturbance.   Respiratory:  Negative for shortness of breath and wheezing.    Cardiovascular:  Positive for palpitations.   Gastrointestinal:  Negative for abdominal pain.   Musculoskeletal:  Positive for neck pain and neck stiffness. Negative for back pain.        Decreased ROM, joint and muscle pain   Neurological:  Positive for weakness and headaches. Negative for numbness.   Psychiatric/Behavioral:  Positive for sleep disturbance. Negative for decreased concentration. The patient is nervous/anxious.            Past Medical History:   Diagnosis Date    Abdominal bloating     Hemorrhoid     Pityriasis rosea     SVT (supraventricular tachycardia) (HCC)        Past Surgical History:   Procedure Laterality Date    HYSTERECTOMY  2017    partial-ovaries remain       Family History   Problem Relation Age of Onset    Breast cancer Mother     Cancer Mother         breast    Coronary artery disease Father     Cancer Father         lung    Cancer Maternal Aunt         lymphoma,uterine,breast       Social History     Occupational History    Not on file   Tobacco Use    Smoking status: Never     Passive exposure: Never    Smokeless tobacco: Never   Vaping Use    Vaping status: Never Used   Substance and Sexual Activity    Alcohol use: Yes     Comment: rare    Drug use: Never    Sexual activity: Not on file     Comment: partial hysterectomy         Current Outpatient Medications:     atorvastatin (LIPITOR) 10 mg tablet, Take 1 tablet (10 mg total) by mouth daily, Disp: 90 tablet, Rfl: 3    diclofenac (VOLTAREN) 75 mg EC tablet, Take 1 tablet (75 mg total) by mouth 2  (two) times a day, Disp: 60 tablet, Rfl: 1    diphenhydrAMINE (BENADRYL) 25 mg tablet, Take 25 mg by mouth daily at bedtime , Disp: , Rfl:     Ibuprofen 200 MG CAPS, Take 1 capsule by mouth daily at bedtime , Disp: , Rfl:     metoprolol tartrate (LOPRESSOR) 25 mg tablet, Take 1 tablet (25 mg total) by mouth 2 (two) times a day as needed (Rapid heartbeat), Disp: 30 tablet, Rfl: 5    Allergies   Allergen Reactions    Carvedilol GI Intolerance and Visual Disturbance    Metaxalone Hives     Reaction Date: 31Oct2005;     Penicillins Hives     Reaction Date: 12May2005;     Doxycycline Rash    Metoprolol Tartrate [Metoprolol] GI Intolerance       Physical Exam:    There were no vitals taken for this visit.    Constitutional: normal, well developed, well nourished, alert, in no distress and non-toxic and no overt pain behavior.  Eyes: anicteric  HEENT: grossly intact  Neck: supple, symmetric, trachea midline and no masses   Pulmonary:even and unlabored  Cardiovascular:No edema or pitting edema present  Skin:Normal without rashes or lesions and well hydrated  Psychiatric:Mood and affect appropriate  Neurologic:Cranial Nerves II-XII grossly intact  Musculoskeletal: Range of motion of the cervical spine is limited secondary to pain.  Positive facet loading tenderness to the cervical paraspinals and upper trapezius on the left with taut myofascial bands.  Strength, sensation, reflexes are preserved in the upper limbs.    Imaging  No orders to display       Orders Placed This Encounter   Procedures    X-ray cervical spine complete 4 or 5 vw    Ambulatory referral to Physical Therapy

## 2025-04-01 ENCOUNTER — EVALUATION (OUTPATIENT)
Dept: PHYSICAL THERAPY | Facility: CLINIC | Age: 65
End: 2025-04-01

## 2025-04-01 DIAGNOSIS — M67.971 DISORDER OF RIGHT ACHILLES TENDON: ICD-10-CM

## 2025-04-01 DIAGNOSIS — M79.18 MYOFASCIAL PAIN SYNDROME: ICD-10-CM

## 2025-04-01 DIAGNOSIS — M47.812 CERVICAL SPONDYLOSIS: Primary | ICD-10-CM

## 2025-04-01 PROCEDURE — 97161 PT EVAL LOW COMPLEX 20 MIN: CPT

## 2025-04-01 NOTE — PROGRESS NOTES
PT Evaluation   Today's date: 2025  Patient name: Sofia Akhtar  : 1960  MRN: [id number]  Referring provider: Stalin Snow DO  Dx:   Encounter Diagnosis     ICD-10-CM    1. Cervical spondylosis  M47.812 Ambulatory referral to Physical Therapy      2. Disorder of right Achilles tendon  M67.971 Ambulatory referral to Physical Therapy      3. Myofascial pain syndrome  M79.18 Ambulatory referral to Physical Therapy          Assessment    Sofia Akhtar is a pleasant 64 y.o. female who presents with a referring diagnosis of cervical spondylosis, disorder of right achilles tendon, myofascial pain syndrome. The patient's greatest concern is prolonged stiffness in the upper back and neck. The primary movement system diagnosis is cervical/thoracic hypomobility with nociplastic and nociceptive pain resulting in pathoanatomical symptoms of activity intolerance, restricted and painful c/s and t/s ROM and strength. The aforementioned impairments have limited the patient's ability to perform ADL's.No further referral is neccessary at this time based upon examination results. Positive prognostic indicators include motivation to improve. Negative prognostic indicators include age.     Impairments: abnormal muscle tone, abnormal or restricted ROM, activity intolerance, Impaired physical strength, lacks appropriate HEP, pain with function, poor posture, and poor body mechanics  Symptom Irritability: high    Problem List:  - c/s and t/s hypomobility  - c/s +t/s+postural musculature strengthening    Concordant Sign:  - c/s flexion produces pain and pulling at the back of the neck/ upper trap area    Patient education performed this session included: prognosis and diagnosis, plan of care    Patient verbalized fair understanding of POC.    Please contact me if you have any questions or recommendations. Thank you for the referral and the opportunity to share in Sofia Akhtar's care.       Plan    Patient would benefit from:  Skilled PT  Planned modality interventions: biofeedback, cryotherapy, TENS, thermotherapy (hot pack), and traction  Planned therapy interventions: abdominal trunk stabilization, activity modification, balance/weight bearing training, behavior modification, body mechanics training, functional ROM exercises, graded exercise, graded motor, HEP, joint mobilization, manual therapy, Crump taping, motor coordination training, neuromuscular re-education, patient education, postural training, strengthening, stretching, therapeutic activities, and therapeutic exercises    Frequency: 2x per week  Duration in weeks: 6 weeks    Plan of Care beginning date: 4/1/2025  Plan of Care expiration date: 6 weeks - 5/13/2025  Treatment plan discussed with: patient      Goals    Short Term Goals (3 weeks):    Patient will be independent with basic HEP.  Patient will report >50% reduction in pain.  Patient will demonstrate >1/3 improvement in MMT grade as applicable  Patient will demonstrate pain free AROM of c/s rotation to 50% of available ROM to improve comfort  and safety while driving in the car.  Patient will independently perform 3x10 c/s retraction to improve activity tolerance and promote better sitting posture while working.     Long Term Goals (6 weeks):  Patient will be independent in a comprehensive home exercise program  Patient FOTO score will improve by 10 points  Patient will self-report >75% improvement in function  Patient will drive for 20 minutes straight with pain free c/s rotation AROM  Patient will be able to independently go to the gym 2x/week and perform strengthening for the UE without limitation from her c/s.       History    History of Present Illness  Mechanism of injury: arthritis left c/s degeneration    Patient reports a history of neck pain on the Lt side. She states that she also has a knot in her neck/thoracic spine that has been there for 20 years. 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  She gets pain with driving, sitting, and working. She gets some relief with medicine such as Advil and pressing into it. She had physical therapy in the past which did not improve her symptoms.      Progression: worsening    Previous treatment: physical therapy  Current treatment: medication    Patient goals for therapy: decrease pain+ strengthen     Pain   4/1/2025    Current 5-6/10    Best 3/10    Worst 7/10     Location: left c/s joint degeneration.   Description: sharp Upper back tightness/ intermittent pinching on the left side lower c/s.  Aggravating factors: prolonged sitting/ driving/ sleeping  Relieving factors: medications, stretching, massage      Physical Examination    Red Flag Screening  (+): age >50 years old    VBI assessment: (-) 5 D's and 3 N's     Postural Assessment  Posture in Sitting: good  Posture in Standing: good  Postural Correction: makes symptoms better   Manual or self correction? manual    Sensation  Light touch: intact bilaterally    Cervical Spine ROM   4/1/2025    Flexion 35%*    Extension 70%    Lt Rotation 40%     Rt Rotation 40%    Lt Lateral Flexion 25%    Rt Lateral Flexion 25%     Thoracic Spine ROM   4/1/2025    Extension tightness in L. Upper c/s     UE AROM  WNL and pain-free    UE MMT   4/1/2025    LEFT RIGHT     Shoulder Flexion 4/5 4    Shoulder Abduction 4 4    Shoulder Extension 4 4/5    Shoulder ER  4 4    Shoulder IR  4 4       Elbow Flexion 4 4    Elbow Extension 4 4/5     (*) = reproduction of patient's reported pain    Palpation  (+) TTP of left levator scapulae + upper trap.     Functional Assessment  Unable to turn her head while driving         Insurance:  AMA/CMS Eval/ Re-eval Auth #/ Referral # Total units  Start date  Expiration date Extension  Visit limitation?  PT only or  PT+OT? Co-Insurance   CMS 4/1/2025 SELF PAY                                                            POC Start Date POC Expiration Date Signed POC?   4/1/2025 6 weeks - 5/13/2025        Date               Units:  Used               Authed:  Remaining                  Precautions:   Past Medical History:   Diagnosis Date    Abdominal bloating     Hemorrhoid     Pityriasis rosea     SVT (supraventricular tachycardia) (HCC)        Date 4/1/2025        Visit Number IE        FOTO Tracking Intake Survey     Follow-up #1   Manual         Cervical mobs         Cervical STM         Thoracic mobs                           TherEx                                                                                 Neuro Re-Ed         Chin tuck 5x per day 10x each        DNF lift         Postural strengthening                                                      TherAct         Patient education HEP and POC x 10 min        Sleeping posture Towel roll                                   Gait Training                                    Modalities         Heat

## 2025-04-07 ENCOUNTER — OFFICE VISIT (OUTPATIENT)
Dept: PHYSICAL THERAPY | Facility: CLINIC | Age: 65
End: 2025-04-07

## 2025-04-07 DIAGNOSIS — M47.812 CERVICAL SPONDYLOSIS: Primary | ICD-10-CM

## 2025-04-07 DIAGNOSIS — M67.971 DISORDER OF RIGHT ACHILLES TENDON: ICD-10-CM

## 2025-04-07 DIAGNOSIS — M79.18 MYOFASCIAL PAIN SYNDROME: ICD-10-CM

## 2025-04-07 PROCEDURE — 97112 NEUROMUSCULAR REEDUCATION: CPT

## 2025-04-07 PROCEDURE — 97140 MANUAL THERAPY 1/> REGIONS: CPT

## 2025-04-07 NOTE — PROGRESS NOTES
Daily Note     Today's date: 2025  Patient name: Sofia Akhtar  : 1960  MRN: [id number]  Referring provider: Stalin Snow DO  Dx:   Encounter Diagnosis     ICD-10-CM    1. Cervical spondylosis  M47.812       2. Disorder of right Achilles tendon  M67.971       3. Myofascial pain syndrome  M79.18                      Subjective: Patient reports pain and stiffness in the neck today. Stated had done chin tucks this weekend and noticed some soreness immediately after the exercise was performed. Patient reported that she had tried the towel roll for sleeping posture and did not find any relief.       Objective: See treatment diary below      Assessment: Tolerated treatment well. Patient presented with mobility restrictions limiting her ability to freely move the c/s accompanied by pain in all end range positions, demonstrated positive response to sub-occipital release and repeated retraction.Continue to improve c/s and t/s mobility in pain free ranges to patient tolerance. Patient demonstrated fatigue post treatment and would benefit from continued PT to address functional mobility deficits.       Plan: Continue per plan of care.        Insurance:  AMA/CMS Eval/ Re-eval Auth #/ Referral # Total units  Start date  Expiration date Extension  Visit limitation?  PT only or  PT+OT? Co-Insurance   CMS 2025 SELF PAY                                                            POC Start Date POC Expiration Date Signed POC?   2025 6 weeks - 2025       Date               Units:  Used               Authed:  Remaining                  Precautions:   Past Medical History:   Diagnosis Date    Abdominal bloating     Hemorrhoid     Pityriasis rosea     SVT (supraventricular tachycardia) ([facility])        Date [id number]       Visit Number IE        FOTO Tracking Intake Survey     Follow-up #1   Manual         Cervical mobs- sub-occ release, PA glide grade 1, c/s distraction  10'       Cervical STM        "  Thoracic mobs                           TherEx                                                                                 Neuro Re-Ed         Chin tuck 5x per day 10x each 20x5\" hold       DNF lift         Postural strengthening  Rows -2x10 purple tubing         Open books  2x10 ea       Thoracic Ext. seated  2x10                                  TherAct         Patient education HEP and POC x 10 min        Sleeping posture Towel roll                                   Gait Training                                    Modalities         Heat                   "

## 2025-04-09 ENCOUNTER — OFFICE VISIT (OUTPATIENT)
Dept: PHYSICAL THERAPY | Facility: CLINIC | Age: 65
End: 2025-04-09

## 2025-04-09 DIAGNOSIS — M67.971 DISORDER OF RIGHT ACHILLES TENDON: ICD-10-CM

## 2025-04-09 DIAGNOSIS — M47.812 CERVICAL SPONDYLOSIS: Primary | ICD-10-CM

## 2025-04-09 DIAGNOSIS — M79.18 MYOFASCIAL PAIN SYNDROME: ICD-10-CM

## 2025-04-09 PROCEDURE — 97140 MANUAL THERAPY 1/> REGIONS: CPT

## 2025-04-09 PROCEDURE — 97112 NEUROMUSCULAR REEDUCATION: CPT

## 2025-04-09 NOTE — PROGRESS NOTES
Daily Note     Today's date: 2025  Patient name: Sofia Akhtar  : 1960  MRN: [id number]  Referring provider: Stalin Snow DO  Dx:   Encounter Diagnosis     ICD-10-CM    1. Cervical spondylosis  M47.812       2. Disorder of right Achilles tendon  M67.971       3. Myofascial pain syndrome  M79.18                        Subjective: Patient stated feeling a good stretch that goes down the back after performing c/s distraction and sub-occipital muscle release today. Reported open books feels good on the back. Stated felt no pain but did feel muscle fatigue after performing new exercises.      Objective: See treatment diary below      Assessment: Tolerated treatment well. Patient presented with little to no pain in the c/s, but still presenting with moderate stiffness. Responding well to c/s distraction and postural strengthening exercise. Demonstrated increased ability to maintain controlled c/s retraction today during functional activities. Continue to work on postural strengthening and ability to maintain and control retraction during functional activities. Patient demonstrated fatigue post treatment and would benefit from continued PT to address functional mobility deficits.       Plan: Continue per plan of care.        Insurance:  AMA/CMS Eval/ Re-eval Auth #/ Referral # Total units  Start date  Expiration date Extension  Visit limitation?  PT only or  PT+OT? Co-Insurance   CMS 2025 SELF PAY                                                            POC Start Date POC Expiration Date Signed POC?   2025 6 weeks - 2025       Date               Units:  Used               Authed:  Remaining                  Precautions:   Past Medical History:   Diagnosis Date    Abdominal bloating     Hemorrhoid     Pityriasis rosea     SVT (supraventricular tachycardia) (HCC)        Date 2025      Visit Number IE        FOTO Tracking Intake Survey     Follow-up #1   Manual         Cervical  "mobs- sub-occ release, PA glide grade 1, c/s distraction  10' 10'      Cervical STM         Thoracic mobs                           TherEx                                                                                 Neuro Re-Ed         Chin tuck 5x per day 10x each 20x5\" hold 20x3\" hold    Chin tuck into ball w/ horizontal abd. red TB 2x10    Chin tuck into ball at wall w/ PNF d2 flexion red TB 2x10 ea.      DNF lift         Postural strengthening  Rows -2x10 purple tubing         Open books  2x10 ea 2x10 ea.      Thoracic Ext. seated  2x10 2x10                                 TherAct         Patient education HEP and POC x 10 min        Sleeping posture Towel roll                                   Gait Training                                    Modalities         Heat                   "

## 2025-04-14 ENCOUNTER — OFFICE VISIT (OUTPATIENT)
Dept: PHYSICAL THERAPY | Facility: CLINIC | Age: 65
End: 2025-04-14

## 2025-04-14 DIAGNOSIS — M47.812 CERVICAL SPONDYLOSIS: Primary | ICD-10-CM

## 2025-04-14 DIAGNOSIS — M79.18 MYOFASCIAL PAIN SYNDROME: ICD-10-CM

## 2025-04-14 DIAGNOSIS — M67.971 DISORDER OF RIGHT ACHILLES TENDON: ICD-10-CM

## 2025-04-14 PROCEDURE — 97112 NEUROMUSCULAR REEDUCATION: CPT

## 2025-04-14 PROCEDURE — 97140 MANUAL THERAPY 1/> REGIONS: CPT

## 2025-04-14 NOTE — PROGRESS NOTES
Daily Note     Today's date: 2025  Patient name: Sofia Akhtar  : 1960  MRN: [id number]  Referring provider: Stalin Snow DO  Dx:   Encounter Diagnosis     ICD-10-CM    1. Cervical spondylosis  M47.812       2. Disorder of right Achilles tendon  M67.971       3. Myofascial pain syndrome  M79.18                          Subjective: Patient stated felt some relief after c/s distraction today. Reported feeling a good stretch when performing open books. Stated was doing HEP over the weekend and felt sore a few hours afterwards, still slight soreness in the neck today with moderate amount of stiffness that has remained since .      Objective: See treatment diary below      Assessment: Tolerated treatment well. Patient presented with increased pain and stiffness in the neck, had performed HEP  and is still dealing with some lingering muscle soreness, stiffness, and slight pain in the c/s. Demonstrated a positive response to c/s distraction and thoracic mobility exercises that emphasize extension and rotation.  Continue to work on postural strengthening and ability to maintain and control retraction during functional activities. Plan to progress with thoracic rotation exercises with addition of light resistance to patient tolerance. Patient demonstrated fatigue post treatment and would benefit from continued PT to address functional mobility deficits.       Plan: Continue per plan of care.        Insurance:  A/CMS Eval/ Re-eval Auth #/ Referral # Total units  Start date  Expiration date Extension  Visit limitation?  PT only or  PT+OT? Co-Insurance   CMS 2025 SELF PAY                                                            POC Start Date POC Expiration Date Signed POC?   2025 6 weeks - 2025       Date               Units:  Used               Authed:  Remaining                  Precautions:   Past Medical History:   Diagnosis Date    Abdominal bloating     Hemorrhoid      "Pityriasis rosea     SVT (supraventricular tachycardia) (HCC)        Date 4/1/2025 4/725 4/9/25 4/14/25     Visit Number IE 1 2 3     FOTO Tracking Intake Survey     Follow-up #1   Manual         Cervical mobs- sub-occ release, PA glide grade 1, c/s distraction  10' 10' 10'     Cervical STM         Thoracic mobs                           TherEx                                                                                 Neuro Re-Ed         Chin tuck 5x per day 10x each 20x5\" hold 20x3\" hold    Chin tuck into ball w/ horizontal abd. red TB 2x10    Chin tuck into ball at wall w/ PNF d2 flexion red TB 2x10 ea. 20x3\" hold    10x chin tuck w/ c/s extension     20x3\" hold c/s extension snag w/ towel     DNF lift         Postural strengthening  Rows -2x10 purple tubing    CC- core activated rotation 2x10 ea. 7.5#    Side planks w/ knees bent 2x15\" hold ea.emphasis on keeping head straight and lock out shoulder       Open books  2x10 ea 2x10 ea. 2x10 ea.     Thoracic Ext. seated  2x10 2x10 2x10     Thread the needle    2x10 ea.                        TherAct         Patient education HEP and POC x 10 min        Sleeping posture Towel roll                                   Gait Training                                    Modalities         Heat                   "

## 2025-04-16 ENCOUNTER — APPOINTMENT (OUTPATIENT)
Dept: PHYSICAL THERAPY | Facility: CLINIC | Age: 65
End: 2025-04-16

## 2025-04-22 ENCOUNTER — OFFICE VISIT (OUTPATIENT)
Dept: PHYSICAL THERAPY | Facility: CLINIC | Age: 65
End: 2025-04-22

## 2025-04-22 DIAGNOSIS — M47.812 CERVICAL SPONDYLOSIS: Primary | ICD-10-CM

## 2025-04-22 DIAGNOSIS — M79.18 MYOFASCIAL PAIN SYNDROME: ICD-10-CM

## 2025-04-22 DIAGNOSIS — M67.971 DISORDER OF RIGHT ACHILLES TENDON: ICD-10-CM

## 2025-04-22 PROCEDURE — 97112 NEUROMUSCULAR REEDUCATION: CPT

## 2025-04-22 PROCEDURE — 97140 MANUAL THERAPY 1/> REGIONS: CPT

## 2025-04-22 NOTE — PROGRESS NOTES
Daily Note     Today's date: 2025  Patient name: Sofia Akhtar  : 1960  MRN: [id number]  Referring provider: Stalin Snow DO  Dx:   Encounter Diagnosis     ICD-10-CM    1. Cervical spondylosis  M47.812       2. Disorder of right Achilles tendon  M67.971       3. Myofascial pain syndrome  M79.18                            Subjective: Patient stated felt relief from tightness after stretching today. Reported feeling a good stretch when performing cervical rotational snag today. Still dealing with lingering tightness in the neck that will get better during the session but lingers afterwards throughout the day.       Objective: See treatment diary below      Assessment: Tolerated treatment well. Worked on increasing tolerance to c/s mobility, and decreasing compensatory patterns present at t/s. Patient presented with increased pain and stiffness in the neck, finds most relief after performing c/s snags into rotation, but needed constant cueing for form. Cued the patient to move from the neck and not from the thoracic spine. Patient was given new HEP with stretches to be performed daily. Plan to assess change in symptoms in the next session after HEP is performed. Plan to progress with thoracic rotation exercises with addition of light resistance to patient tolerance. Discussed POC with patient who is going to decide if she wants to continue with current POC given current financial status. Patient stated will call the office to schedule next appointment if she decided to continue with POC. Patient demonstrated fatigue post treatment and would benefit from continued PT to address functional mobility deficits.       Plan: Continue per plan of care.        Insurance:  AMA/CMS Eval/ Re-eval Auth #/ Referral # Total units  Start date  Expiration date Extension  Visit limitation?  PT only or  PT+OT? Co-Insurance   CMS 2025 SELF PAY                                                            POC Start Date  "POC Expiration Date Signed POC?   4/1/2025 6 weeks - 5/13/2025       Date               Units:  Used               Authed:  Remaining                  Precautions:   Past Medical History:   Diagnosis Date    Abdominal bloating     Hemorrhoid     Pityriasis rosea     SVT (supraventricular tachycardia) (HCC)        Date 4/1/2025 4/725 4/9/25 4/14/25 4/22/25     Visit Number IE 1 2 3 4     FOTO Tracking Intake Survey      Follow-up #1   Manual          Cervical mobs- sub-occ release, PA glide grade 1, c/s distraction,   10' 10' 10' 10'     Cervical STM          Thoracic mobs                              TherEx                                                                                          Neuro Re-Ed          Chin tuck 5x per day 10x each 20x5\" hold 20x3\" hold    Chin tuck into ball w/ horizontal abd. red TB 2x10    Chin tuck into ball at wall w/ PNF d2 flexion red TB 2x10 ea. 20x3\" hold    10x chin tuck w/ c/s extension     20x3\" hold c/s extension snag w/ towel [place] in prone 20x3\"    C/s rotation snag 20x3\" ea.         DNF lift          Postural strengthening  Rows -2x10 purple tubing    CC- core activated rotation 2x10 ea. 7.5#    Side planks w/ knees bent 2x15\" hold ea.emphasis on keeping head straight and lock out shoulder        Open books  2x10 ea 2x10 ea. 2x10 ea. 2x10 ea.     Thoracic Ext. seated  2x10 2x10 2x10      Thread the needle    2x10 ea.       C/s SB w/ rotation     20x3\"               TherAct          Patient education HEP and POC x 10 min         Sleeping posture Towel roll                                       Gait Training                                        Modalities          Heat                    "

## 2025-05-13 ENCOUNTER — OFFICE VISIT (OUTPATIENT)
Dept: FAMILY MEDICINE CLINIC | Facility: CLINIC | Age: 65
End: 2025-05-13
Payer: COMMERCIAL

## 2025-05-13 VITALS
RESPIRATION RATE: 16 BRPM | OXYGEN SATURATION: 99 % | TEMPERATURE: 97.2 F | HEART RATE: 88 BPM | SYSTOLIC BLOOD PRESSURE: 118 MMHG | BODY MASS INDEX: 21.17 KG/M2 | DIASTOLIC BLOOD PRESSURE: 78 MMHG | WEIGHT: 124 LBS | HEIGHT: 64 IN

## 2025-05-13 DIAGNOSIS — J06.9 ACUTE URI: Primary | ICD-10-CM

## 2025-05-13 PROCEDURE — 99213 OFFICE O/P EST LOW 20 MIN: CPT | Performed by: FAMILY MEDICINE

## 2025-05-13 RX ORDER — AZITHROMYCIN 250 MG/1
TABLET, FILM COATED ORAL
Qty: 6 TABLET | Refills: 0 | Status: SHIPPED | OUTPATIENT
Start: 2025-05-13 | End: 2025-05-18

## 2025-05-13 NOTE — PROGRESS NOTES
"Name: Sofia Akhtar      : 1960      MRN: [id number]  Encounter Provider: Sonia Velázquez MD  Encounter Date: 2025   Encounter department: [facility]  :  Assessment & Plan  Acute URI  Counseled on supportive care.   Orders:    azithromycin (Zithromax) 250 mg tablet; Take 2 tablets (500 mg total) by mouth daily for 1 day, THEN 1 tablet (250 mg total) daily for 4 days.           History of Present Illness   URI   This is a new problem. The current episode started 1 to 4 weeks ago. The problem has been unchanged. There has been no fever. Associated symptoms include congestion, coughing, a plugged ear sensation, rhinorrhea and sinus pain. Pertinent negatives include no abdominal pain, chest pain, diarrhea, dysuria, ear pain, headaches, joint pain, joint swelling, nausea, neck pain, rash, sneezing, sore throat, swollen glands, vomiting or wheezing. She has tried NSAIDs and antihistamine for the symptoms. The treatment provided no relief.     Review of Systems   HENT:  Positive for congestion, rhinorrhea and sinus pain. Negative for ear pain, sneezing and sore throat.    Respiratory:  Positive for cough. Negative for wheezing.    Cardiovascular:  Negative for chest pain.   Gastrointestinal:  Negative for abdominal pain, diarrhea, nausea and vomiting.   Genitourinary:  Negative for dysuria.   Musculoskeletal:  Negative for joint pain and neck pain.   Skin:  Negative for rash.   Neurological:  Negative for headaches.       Objective   /78   Pulse 88   Temp (!) 97.2 °F (36.2 °C) (Temporal)   Resp 16   Ht 5' 4\" (1.626 m)   Wt 56.2 kg (124 lb)   SpO2 99%   BMI 21.28 kg/m²      Physical Exam  Constitutional:       General: She is not in acute distress.     Appearance: Normal appearance. She is normal weight. She is not ill-appearing, toxic-appearing or diaphoretic.   HENT:      Head: Normocephalic and atraumatic.      Right Ear: Tympanic membrane, ear canal and external ear normal. There is no " impacted cerumen.      Left Ear: Tympanic membrane, ear canal and external ear normal. There is no impacted cerumen.      Nose: Nose normal.      Mouth/Throat:      Mouth: Mucous membranes are moist.      Pharynx: Oropharynx is clear. No oropharyngeal exudate or posterior oropharyngeal erythema.   Eyes:      General: No scleral icterus.        Right eye: No discharge.         Left eye: No discharge.      Extraocular Movements: Extraocular movements intact.      Conjunctiva/sclera: Conjunctivae normal.      Pupils: Pupils are equal, round, and reactive to light.   Cardiovascular:      Rate and Rhythm: Normal rate and regular rhythm.      Pulses: Normal pulses.      Heart sounds: Normal heart sounds. No murmur heard.     No friction rub. No gallop.   Pulmonary:      Effort: Pulmonary effort is normal. No respiratory distress.      Breath sounds: Normal breath sounds. No stridor. No wheezing, rhonchi or rales.   Chest:      Chest wall: No tenderness.   Musculoskeletal:         General: Normal range of motion.   Skin:     General: Skin is warm and dry.   Neurological:      General: No focal deficit present.      Mental Status: She is alert and oriented to person, place, and time.

## 2025-05-21 ENCOUNTER — OFFICE VISIT (OUTPATIENT)
Dept: FAMILY MEDICINE CLINIC | Facility: CLINIC | Age: 65
End: 2025-05-21
Payer: COMMERCIAL

## 2025-05-21 VITALS
RESPIRATION RATE: 18 BRPM | HEIGHT: 64 IN | HEART RATE: 85 BPM | DIASTOLIC BLOOD PRESSURE: 74 MMHG | BODY MASS INDEX: 21.34 KG/M2 | WEIGHT: 125 LBS | SYSTOLIC BLOOD PRESSURE: 120 MMHG | TEMPERATURE: 97.4 F | OXYGEN SATURATION: 98 %

## 2025-05-21 DIAGNOSIS — E78.2 MIXED DYSLIPIDEMIA: Primary | ICD-10-CM

## 2025-05-21 DIAGNOSIS — R42 VERTIGO: ICD-10-CM

## 2025-05-21 DIAGNOSIS — M54.2 CERVICALGIA: ICD-10-CM

## 2025-05-21 PROCEDURE — 99214 OFFICE O/P EST MOD 30 MIN: CPT | Performed by: INTERNAL MEDICINE

## 2025-05-21 RX ORDER — CYCLOBENZAPRINE HCL 10 MG
10 TABLET ORAL 3 TIMES DAILY PRN
Qty: 30 TABLET | Refills: 0 | Status: SHIPPED | OUTPATIENT
Start: 2025-05-21

## 2025-05-21 RX ORDER — MELOXICAM 15 MG/1
15 TABLET ORAL DAILY PRN
Qty: 30 TABLET | Refills: 0 | Status: SHIPPED | OUTPATIENT
Start: 2025-05-21

## 2025-05-21 NOTE — PROGRESS NOTES
"Name: Sofia Akhtar      : 1960      MRN: [id number]  Encounter Provider: Ngoc Shaffer MD  Encounter Date: 2025   Encounter department: [facility]  :  Assessment & Plan  Mixed dyslipidemia  She will continue atorvastatin and check labs prior to next appointment.  Orders:  •  CBC; Future  •  Comprehensive metabolic panel; Future  •  Lipid panel; Future    Cervicalgia  Worsening.  Medications ordered and advised patient on need for moist heat.  I gave her a note to be able to continue to work from home.   Orders:  •  meloxicam (MOBIC) 15 mg tablet; Take 1 tablet (15 mg total) by mouth daily as needed for moderate pain  •  cyclobenzaprine (FLEXERIL) 10 mg tablet; Take 1 tablet (10 mg total) by mouth 3 (three) times a day as needed for muscle spasms    Vertigo  Due to neck issues, will work on those as above.               History of Present Illness   Here for follow up of hyperlipidemia.  Has been having a lot of neck pain.  Is on the computer constantly for work and is worried about driving because she can't turn her head and is also having vertigo with head movements.  Tried PT but could not tolerate.  XR shows disc narrowing and arthritic changes. Has some tingling in her face.       Review of Systems   Constitutional: Negative.    Respiratory: Negative.     Cardiovascular: Negative.    Musculoskeletal:  Positive for neck pain and neck stiffness.   Neurological:  Positive for numbness.       Objective   /74 Comment: supine  Pulse 85   Temp (!) 97.4 °F (36.3 °C)   Resp 18   Ht 5' 4\" (1.626 m)   Wt 56.7 kg (125 lb)   SpO2 98%   BMI 21.46 kg/m²      Physical Exam  Constitutional:       Appearance: Normal appearance.   HENT:      Head: Normocephalic and atraumatic.     Eyes:      Pupils: Pupils are equal, round, and reactive to light.       Cardiovascular:      Rate and Rhythm: Normal rate and regular rhythm.      Heart sounds: No murmur heard.     No friction rub. No gallop. "   Pulmonary:      Effort: Pulmonary effort is normal.      Breath sounds: Normal breath sounds. No wheezing or rales.   Abdominal:      General: Abdomen is flat. Bowel sounds are normal.      Palpations: Abdomen is soft.      Tenderness: There is no abdominal tenderness.     Musculoskeletal:         General: No swelling.      Cervical back: Spasms and tenderness present. Decreased range of motion.     Neurological:      Mental Status: She is alert.

## 2025-05-21 NOTE — LETTER
May 21, 2025     Patient: Sofia Akhtar  YOB: 1960  Date of Visit: 5/21/2025      To Whom it May Concern:    Sofia Akhtar is under my professional care. Sofia was seen in my office on 5/21/2025. Sofia Due to ongoing orthopedic issues and resultant vertigo, patient is unable to commute to work and must be allowed to work from home until at least 6/21/25.    If you have any questions or concerns, please don't hesitate to call.         Sincerely,          Ngoc Shaffer MD        CC: No Recipients

## 2025-06-03 ENCOUNTER — EVALUATION (OUTPATIENT)
Dept: PHYSICAL THERAPY | Facility: CLINIC | Age: 65
End: 2025-06-03
Payer: COMMERCIAL

## 2025-06-03 DIAGNOSIS — R42 DIZZINESS: Primary | ICD-10-CM

## 2025-06-03 DIAGNOSIS — M47.812 CERVICAL SPONDYLOSIS: ICD-10-CM

## 2025-06-03 PROCEDURE — 97110 THERAPEUTIC EXERCISES: CPT

## 2025-06-03 NOTE — PROGRESS NOTES
Daily Note     Today's date: 6/3/2025  Patient name: Sofia Akhtar  : 1960  MRN: [id number]  Referring provider: Stalin Snow DO  Dx:   Encounter Diagnosis     ICD-10-CM    1. Dizziness  R42       2. Cervical spondylosis  M47.812                      Subjective: Patient reports doing hep which has been helping. Reports new complaints of vertigo occurring once a day,  lasts a few seconds, reports it typically happening when going from sitting to standing positions.       Objective: See treatment diary below      Goals     Short Term Goals (3 weeks):    Patient will be independent with basic HEP.  Patient will report >50% reduction in pain.  Patient will demonstrate >1/3 improvement in MMT grade as applicable  Patient will demonstrate pain free AROM of c/s rotation to 50% of available ROM to improve comfort  and safety while driving in the car.  Patient will independently perform 3x10 c/s retraction to improve activity tolerance and promote better sitting posture while working.      Long Term Goals (6 weeks):  Patient will be independent in a comprehensive home exercise program  Patient FOTO score will improve by 10 points  Patient will self-report >75% improvement in function  Patient will drive for 20 minutes straight with pain free c/s rotation AROM  Patient will be able to independently go to the gym 2x/week and perform strengthening for the UE without limitation from her c/s.            Pain    2025 6/3/25    Current 5-6/10 6-7/10    Best 3/10 4/10    Worst 7/10 7/10        Physical Examination     Red Flag Screening  25: (-) 5 Dizziness , vertigo   6/3/25 :VBI assessment: (+) 5 Dizziness , vertigo          Cervical Spine ROM    2025 6/3/25    Flexion 35%* 55%*    Extension 70% 70%    Lt Rotation 40%  40%*    Rt Rotation 40% 40%*    Lt Lateral Flexion 25% 25%    Rt Lateral Flexion 25% 25%* pain on left      Thoracic Spine ROM    2025 6/3/25    Extension tightness in L. Upper c/s 60%        UE AROM  WNL and pain-free     UE MMT          4/1/2025 6/3/25    LEFT RIGHT  L/R    Shoulder Flexion 4/5 4 4-/4    Shoulder Abduction 4 4 4-/4    Shoulder Extension 4 4/5 4+/4+    Shoulder ER  4 4 4-/4    Shoulder IR  4 4 4+/4+         Elbow Flexion 4 4 4/4    Elbow Extension 4 4/5 4/4      (*) = reproduction of patient's reported pain     Palpation  4/1/25: (+) TTP of left levator scapulae + upper trap.   6/3/25:(+) TTP of left levator scapulae + upper trap.          Assessment:Sofia Akhtar has attended 5 visits over 8 weeks. Despite improvements, patient continues to have limitations in C/S AROM with pain and UE strength of the left shoulder and persistent stiffness as noted above. These impairments continue to limit the patient's ability to turn neck comfortably without pain and stiffness. Patient has not made progress towards short term goals and long term goals. They would benefit from continued skilled physical therapy 1x per week for 4 weeks to address these impairments and functional limitations in order to return to PLOF.        Plan: Patient to be evaluated by neuro PT on Thursday 6/3/25. Extend PLOC 4 weeks        Insurance:  AMA/CMS Eval/ Re-eval Auth #/ Referral # Total units  Start date  Expiration date Extension  Visit limitation?  PT only or  PT+OT? Co-Insurance   CMS 4/1/2025 SELF PAY                                                            POC Start Date POC Expiration Date Signed POC?   4/1/2025 6 weeks - 5/13/2025     4 weeks-7/1/25       Date               Units:  Used               Authed:  Remaining                  Precautions:   Past Medical History:   Diagnosis Date    Abdominal bloating     Hemorrhoid     Pityriasis rosea     SVT (supraventricular tachycardia) ([facility])        Date 4/1/2025 4/725 4/9/25 4/14/25 4/22/25 6/3/25    Visit Number IE 1 2 3 4 5    FOTO Tracking Intake Survey      Follow-up #1   Manual          Cervical mobs- sub-occ release, PA glide grade 1, c/s distraction,   10'  "10' 10' 10'     Cervical STM          Thoracic mobs                              TherEx                                                                                          Neuro Re-Ed          Chin tuck 5x per day 10x each 20x5\" hold 20x3\" hold    Chin tuck into ball w/ horizontal abd. red TB 2x10    Chin tuck into ball at wall w/ PNF d2 flexion red TB 2x10 ea. 20x3\" hold    10x chin tuck w/ c/s extension     20x3\" hold c/s extension snag w/ towel [place] in prone 20x3\"    C/s rotation snag 20x3\" ea. [place] in prone 20x3\"    C/s rotation snag 10x3\" ea.        DNF lift          Postural strengthening  Rows -2x10 purple tubing    CC- core activated rotation 2x10 ea. 7.5#    Side planks w/ knees bent 2x15\" hold ea.emphasis on keeping head straight and lock out shoulder        Open books  2x10 ea 2x10 ea. 2x10 ea. 2x10 ea.     Thoracic Ext. seated  2x10 2x10 2x10   15x    Thread the needle    2x10 ea.       C/s SB w/ rotation     20x3\"               TherAct          Patient education HEP and POC x 10 min         Sleeping posture Towel roll                                       Gait Training                                        Modalities          Heat                      "

## 2025-06-05 ENCOUNTER — APPOINTMENT (OUTPATIENT)
Facility: CLINIC | Age: 65
End: 2025-06-05
Payer: COMMERCIAL

## 2025-06-05 ENCOUNTER — EVALUATION (OUTPATIENT)
Facility: CLINIC | Age: 65
End: 2025-06-05
Payer: COMMERCIAL

## 2025-06-05 DIAGNOSIS — R42 DIZZINESS: Primary | ICD-10-CM

## 2025-06-05 PROCEDURE — 97530 THERAPEUTIC ACTIVITIES: CPT

## 2025-06-05 NOTE — PROGRESS NOTES
PT Re-Evaluation          POC expires Unit limit Auth Expiration date PT/OT + Visit Limit? Co-Insurance   25 Submitted NA NA                                    Visit/Unit Tracking  AUTH Status:  Date               Authed: Submitted Used                Remaining                             Today's date: 2025  Patient name: Sofia Akhtar  : 1960  MRN: [id number]  Referring provider: Ngoc Shaffer MD  Dx:   Encounter Diagnosis     ICD-10-CM    1. Dizziness  R42             Assessment  Assessment details: Patient is a 64 year old female presenting to skilled OPPT for IE with complaints of episodic dizziness that has impacted participation in higher level roles and activities including recreational and work related tasks. PMH significant for: neck pain and SVT. Patient reports ongoing episodic dizziness of two kinds, including spinning dizziness and an off balance sensation. Cervical integrity intact per normal findings for the mVBI, Alar Ligament, and Sharp Edis tests. Performed positional testing as patient's subjective was consistent with BPPV; all findings negative. Vestibular screen with no significant central or peripheral findings. Patient did perform below normative values for FTEC on foam condition of the mCTSIB, likely indicating overall poor vestibular integration within the balance system. Discussed the multiple and possible multi-factorial causes of dizziness, including cervicogenic dizziness, BPPV that may have resolved spontaneously, and overall poor vestibular integration. Plan to place patient on hold as she has not had dizziness for past few days with plan for patient to return should symptoms return. Patient is a good candidate for skilled OPPT services in order to reduce symptoms of dizziness.       Patient verbalized understanding of POC.    Please contact me if you have any questions or recommendations. Thank you  for the referral and the opportunity to share in Sofia Akhtar's care.      Cut off score   All date taken from [facility] Neuro Section or Rehab Measures    DGI:  MDC for Vestibular Disorders: 4 points  MDC for Geriatrics/Community Dwelling Older Adults: 3 Points  Falls risk cut off: <19/24    FGA:  MCID: 4 points  Geriatrics/Community Dwelling Older Adults: </= 22/30 fall risk  Geriatrics/Community Dwelling Older Adults: </= 20/30 unexplained falls in the next 6 months  Parkinsons: </= 18/30 fall risk    mCTSIB (normed on ages 20-60, lower number is less sway or better static balance)  Eyes open firm surface (norm 0.21-0.48)  Eyes closed firm surface (norm 0.48-0.99)  Eyes open foam surface (norm 0.38-0.71)  Eyes closed foam surface (norm 0.70-2.22)    DHI:  0-39: low perception of handicap  40-69: moderate perception of handicap  : severe perception of handicap  > 60: increased risk for falls        Impairments: activity intolerance, impaired balance, lacks appropriate, HEP, safety issue  Understanding of Dx/Px/POC: Excellent  Prognosis: Excellent      Goals    Vestibular Short Term Goals (4 weeks):  - Patient will display improved cervical spine STM by 50% to encourage improved AROM during functional tasks  - Patient will be independent with simple HEP  - Patient will tolerate 60 seconds of oculomotor exercises with minimal increase in symptoms  - Patient will demonstrate 10% decrease in symptom severity scoring with independent use of modalities  - Patient will demonstrate improved soft tissue density t/o cervical region with independent self-release  - Patient will be able to tolerate 30 seconds with eyes closed on foam surface without any loss of balance demonstrating improvement in vestibular system  - Patient will improve with DGI by 3 points per MDC to promote improved safety with dynamic tasks  - Patient will improve FGA score by 4 points per MDC to promote improved safety with dynamic tasks    Vestibular  Long Term Goals (12 weeks):  - Patient will display decreased forward head and rounded shoulders to promote improved resting posture and cervical mobility  - Patient will be independent with complex HEP  - Patient will tolerate >=2 minutes of oculomotor exercises to facilitate return to reading and computer work  - Patient will report >= 50% improvement on symptom severity scoring  - Patient will demonstrate ability to perform HT in gait without veering  - Patient will be able to perform 15 minutes of aerobic activity at HR 70% max to facilitate return to sport/normal functional tasks  - Patient will demonstrate normalized soft tissue t/o  - Patient will score low risk for falls with DGI test with score of 19/24 or higher per current research data  - Patient will score low risk for falls with FGA test with score of 23/30 or higher per current research data  - Patient will report baseline dizziness of 1/10 or less   - Patient will report 2/10 dizziness or less with visual stimulating surround with duration of 2 minutes   - Patient will report subjective improvement to 90% or higher to promote return to PLOF  - Patient will complete work related tasks without exacerbation of symptoms in order to maximize function and promote return to work      Plan  Plan details: vestibular habituation, cervicogenic exercises  Patient would benefit from: PT Eval  Planned therapy interventions: balance, HEP, manual therapy, neuromuscular re-education, patient education  Frequency: 1-3x per week  Duration in weeks: 8 weeks  Plan of Care beginning date: 6/5/2025  Plan of Care expiration date: 8 weeks - 7/31/2025  Treatment plan discussed with: Patient        Subjective Evaluation    History of Present Illness  - Mechanism of injury: Patient is a 64 year old female presenting to skilled OPPT for IE with complaints of episodic dizziness. PMH significant for: neck pain and SVT. Patient notes that a few weeks ago she developed episodes of  "dizziness, one of which she describes as spinning dizziness with position changes and the other she describes as feeling off balance. She finds that when she rolls to her right side the spinning dizziness is triggered. Dizziness typically lasts for seconds before resolving. 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       Patient goal: \"to get rid of dizziness\"    Dizziness Subjective  - How long does dizziness last: for seconds  - How would you describe the dizziness: spinning versus off balance  - Rolling in bed: Yes  - Supine to/from sit: No  - Recent hearing loss: No  - Tinnitus: Yes (L > R)  - Aural fullness/ear pain: Yes (L > R)  - Vision changes: No  - History of recent viral infections: Yes, several weeks ago  - History of migraines: No    Red Flag Screen  - Numbness: No  - Tingling: Yes  - Weakness: No  - Unilateral hearing loss: No  - Slurred speech: No  - Progressive hearing loss: No  - Tremors: No    Pain  Current pain ratin/10  Location: neck pain  Aggravating factors: movement    Social Support  Steps to enter house: 3 KENIA  Stairs in house: flight   Lives in: 2 story    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)  Hand dominance: RHD    Treatments  Previous treatment: none  Current treatment: ortho PT  Diagnostic Testing: see chart for details      Objective     BPPV Objective  Integrity Testing  - mVBI: normal  - Sharp Edis: normal  - Alar Ligament Stability Test: normal  - Palpation: hypertonicity in B/L UT, LS, scalenes, SCM, suboccipitals     Positional Testing  - R Aman-Hallpike: negative  - L [place]-Hallpike: negative  - R Roll Test: negative  - L Roll Test: negative    Vestibular Screen    Oculomotor/VOR:  - Smooth Pursuits: normal, slight undershooting with corrective saccade  - Saccades: normal  - Near Point Convergence (10 cm/4\"): 4 inches  - VORx1: able to maintain gaze, no dizziness but frequent blinking following  - VOR Cx: able " to maintain gaze, no dizziness but frequent blinking following  - Head Shaking Test: negative  - Head Thrust:   - R-L: negative   - L-R: negative      Outcome Measures Initial Eval  6/5/2025        mCTSIB  - FTEO (firm)  - FTEC (firm)  - FTEO (foam)  - FTEC (foam)   30 sec  30 sec  30 sec  10 sec        DGI Defer        FGA Defer        10 meter Defer        DHI defer/100                                                          Precautions: SVT  Past Medical History[1]         [1]   Past Medical History:  Diagnosis Date    Abdominal bloating     Hemorrhoid     Pityriasis rosea     SVT (supraventricular tachycardia) (HCC)

## 2025-06-10 ENCOUNTER — APPOINTMENT (OUTPATIENT)
Dept: PHYSICAL THERAPY | Facility: CLINIC | Age: 65
End: 2025-06-10
Attending: STUDENT IN AN ORGANIZED HEALTH CARE EDUCATION/TRAINING PROGRAM
Payer: COMMERCIAL

## 2025-06-12 ENCOUNTER — RA CDI HCC (OUTPATIENT)
Dept: OTHER | Facility: HOSPITAL | Age: 65
End: 2025-06-12

## 2025-06-12 NOTE — PROGRESS NOTES
HCC coding opportunities       Chart reviewed, no opportunity found: CHART REVIEWED, NO OPPORTUNITY FOUND        Patients Insurance        Commercial Insurance: [employer]

## 2025-06-19 ENCOUNTER — OFFICE VISIT (OUTPATIENT)
Dept: FAMILY MEDICINE CLINIC | Facility: CLINIC | Age: 65
End: 2025-06-19
Payer: COMMERCIAL

## 2025-06-19 VITALS
HEART RATE: 87 BPM | BODY MASS INDEX: 21.28 KG/M2 | WEIGHT: 124 LBS | DIASTOLIC BLOOD PRESSURE: 80 MMHG | TEMPERATURE: 97.3 F | OXYGEN SATURATION: 98 % | RESPIRATION RATE: 14 BRPM | SYSTOLIC BLOOD PRESSURE: 136 MMHG

## 2025-06-19 DIAGNOSIS — R42 VERTIGO: Primary | ICD-10-CM

## 2025-06-19 DIAGNOSIS — M54.2 CERVICALGIA: ICD-10-CM

## 2025-06-19 PROCEDURE — 99213 OFFICE O/P EST LOW 20 MIN: CPT | Performed by: INTERNAL MEDICINE

## 2025-06-19 NOTE — PROGRESS NOTES
Name: Sofia Akhtar      : 1960      MRN: [id number]  Encounter Provider: Ngoc Shaffer MD  Encounter Date: 2025   Encounter department: [facility]  :  Assessment & Plan  Vertigo  Intermittent and not bothering her enough for a referral.  Continue exercises from PT.       Cervicalgia  She will continue exercises from PT and continue moist heat and stretches.  I will fill out accomodation paperwork so she can continue to work from home. She will call for any worsening symptoms.               History of Present Illness   She has continued vertigo but it is very intermittent and transient.  Her major issue is her neck. She felt like PT was just doing the same exercises and they did not help.  No relief with the cyclobenzaprine and meloxicam made her nervous.  She is using heat which helps temporarily. Would like paperwork for accommodations for work completed so she can continue to work from home, take frequent computer breaks, stretch and use her heat.  It is also difficult for her to commute.       Review of Systems   Constitutional: Negative.    Respiratory: Negative.     Cardiovascular: Negative.    Musculoskeletal:  Positive for neck pain and neck stiffness.   Neurological:  Positive for dizziness.       Objective   /80   Pulse 87   Temp (!) 97.3 °F (36.3 °C)   Resp 14   Wt 56.2 kg (124 lb)   SpO2 98%   BMI 21.28 kg/m²      Physical Exam  Constitutional:       Appearance: Normal appearance.   HENT:      Head: Normocephalic and atraumatic.     Eyes:      Pupils: Pupils are equal, round, and reactive to light.       Cardiovascular:      Rate and Rhythm: Normal rate and regular rhythm.      Heart sounds: No murmur heard.     No friction rub. No gallop.   Pulmonary:      Effort: Pulmonary effort is normal.      Breath sounds: Normal breath sounds. No wheezing or rales.   Abdominal:      General: Bowel sounds are normal.     Musculoskeletal:         General: No swelling.       Cervical back: Spasms and tenderness present. Decreased range of motion.     Neurological:      General: No focal deficit present.      Mental Status: She is alert and oriented to person, place, and time. Mental status is at baseline.

## 2025-06-24 ENCOUNTER — TELEPHONE (OUTPATIENT)
Age: 65
End: 2025-06-24